# Patient Record
Sex: MALE | Race: WHITE | URBAN - METROPOLITAN AREA
[De-identification: names, ages, dates, MRNs, and addresses within clinical notes are randomized per-mention and may not be internally consistent; named-entity substitution may affect disease eponyms.]

---

## 2022-03-11 LAB
INFLUENZA A BY PCR: NOT DETECTED
INFLUENZA B BY PCR: NOT DETECTED

## 2022-03-12 LAB
SARS-COV-2: NOT DETECTED
SOURCE: NORMAL

## 2023-02-08 ENCOUNTER — OUTSIDE SERVICES (OUTPATIENT)
Dept: INTERNAL MEDICINE CLINIC | Age: 48
End: 2023-02-08

## 2023-02-08 DIAGNOSIS — M10.9 GOUT, UNSPECIFIED CAUSE, UNSPECIFIED CHRONICITY, UNSPECIFIED SITE: ICD-10-CM

## 2023-02-08 DIAGNOSIS — E27.40 UNSPECIFIED ADRENOCORTICAL INSUFFICIENCY (HCC): ICD-10-CM

## 2023-02-08 DIAGNOSIS — G47.39 OTHER SLEEP APNEA: ICD-10-CM

## 2023-02-08 DIAGNOSIS — N31.9 NEUROMUSCULAR DYSFUNCTION OF BLADDER, UNSPECIFIED: ICD-10-CM

## 2023-02-08 DIAGNOSIS — E66.01 MORBID (SEVERE) OBESITY DUE TO EXCESS CALORIES (HCC): ICD-10-CM

## 2023-02-08 DIAGNOSIS — J96.02 ACUTE RESPIRATORY FAILURE WITH HYPERCAPNIA (HCC): Primary | ICD-10-CM

## 2023-02-08 DIAGNOSIS — I10 ESSENTIAL (PRIMARY) HYPERTENSION: ICD-10-CM

## 2023-02-08 DIAGNOSIS — Q05.9 SPINA BIFIDA, UNSPECIFIED HYDROCEPHALUS PRESENCE, UNSPECIFIED SPINAL REGION (HCC): ICD-10-CM

## 2023-02-08 DIAGNOSIS — G91.2 (IDIOPATHIC) NORMAL PRESSURE HYDROCEPHALUS (HCC): ICD-10-CM

## 2023-02-08 DIAGNOSIS — N18.9 CHRONIC KIDNEY DISEASE, UNSPECIFIED CKD STAGE: ICD-10-CM

## 2023-02-11 VITALS
SYSTOLIC BLOOD PRESSURE: 110 MMHG | DIASTOLIC BLOOD PRESSURE: 68 MMHG | WEIGHT: 246 LBS | TEMPERATURE: 97.6 F | RESPIRATION RATE: 18 BRPM | OXYGEN SATURATION: 97 % | HEART RATE: 88 BPM

## 2023-02-11 RX ORDER — SODIUM PHOSPHATE, DIBASIC AND SODIUM PHOSPHATE, MONOBASIC 7; 19 G/133ML; G/133ML
1 ENEMA RECTAL
COMMUNITY

## 2023-02-11 RX ORDER — BISACODYL 10 MG
10 SUPPOSITORY, RECTAL RECTAL DAILY PRN
COMMUNITY

## 2023-02-11 RX ORDER — ACETAMINOPHEN 325 MG/1
650 TABLET ORAL EVERY 6 HOURS PRN
COMMUNITY

## 2023-02-11 RX ORDER — GUAIFENESIN 600 MG/1
600 TABLET, EXTENDED RELEASE ORAL 2 TIMES DAILY
COMMUNITY

## 2023-02-11 RX ORDER — DOCUSATE SODIUM 100 MG/1
100 CAPSULE, LIQUID FILLED ORAL EVERY MORNING
COMMUNITY

## 2023-02-11 RX ORDER — SENNA PLUS 8.6 MG/1
1 TABLET ORAL
COMMUNITY

## 2023-02-11 RX ORDER — POLYETHYLENE GLYCOL 3350 17 G/17G
17 POWDER, FOR SOLUTION ORAL DAILY PRN
COMMUNITY

## 2023-02-11 RX ORDER — FLUTICASONE PROPIONATE 50 MCG
1 SPRAY, SUSPENSION (ML) NASAL EVERY 12 HOURS PRN
COMMUNITY

## 2023-02-11 RX ORDER — ATENOLOL 50 MG/1
50 TABLET ORAL DAILY
COMMUNITY

## 2023-02-11 RX ORDER — ALBUTEROL SULFATE 2.5 MG/3ML
2.5 SOLUTION RESPIRATORY (INHALATION) EVERY 6 HOURS PRN
COMMUNITY

## 2023-02-11 ASSESSMENT — ENCOUNTER SYMPTOMS
ABDOMINAL PAIN: 0
DIARRHEA: 0
NAUSEA: 0
SHORTNESS OF BREATH: 0
VOMITING: 0

## 2023-02-11 NOTE — PROGRESS NOTES
Visit Date: 02/08/2023 [facility] [place]  Maryellen Reef  1975  male 49 y.o. Subjective:    CC: Patient presents with no current complaints. HPI: Patient presented to [facility] on 1/8/2023. He was severely hypothermic with a rectal temp of 90.1. He also presented with UTI and sepsis. It was thought that the hypothermia was from adrenal insufficiency and patient was given stress dose of IV steroids. He was admitted to ICU. Patient had a change in mental status and was found to be hypoxic with pulse ox in the 50s. Code Blue was called and CPR started. He was intubated and placed on ventilator. A central and art line placed. Patient's code status was changed from [facility] to [facility]. He was extubated on 1/10 however still requiring supplemental oxygen and sometimes requiring Bipap. Patient completed IV Rocephin for UTI. Patient does have a history of spina bifida. He was discharged to [facility] for rehab. Patient was seen and examined. Medications on chart reviewed. Labs are reviewed. Discussed with nursing staff. ROS:  Review of Systems   Constitutional:  Negative for chills and fever. Respiratory:  Negative for shortness of breath. Cardiovascular:  Negative for chest pain. Gastrointestinal:  Negative for abdominal pain, diarrhea, nausea and vomiting. Genitourinary:  Negative for dysuria and frequency. Neurological:  Negative for dizziness and headaches. Current Meds: Refer to nursing home record    PMH:    Medical Problems: reviewed and updated   No past medical history on file. Surgical Hx: reviewed and updated   No past surgical history on file. FH: reviewed and updated   No family history on file. SH: reviewed and updated         Objective:  /68   Pulse 88   Temp 97.6 °F (36.4 °C)   Resp 18   Wt 246 lb (111.6 kg)   SpO2 97%      Physical Exam  Constitutional:       General: He is awake. Appearance: He is obese. HENT:      Head: Normocephalic and atraumatic. Eyes:      Extraocular Movements: Extraocular movements intact. Pupils: Pupils are equal, round, and reactive to light. Cardiovascular:      Rate and Rhythm: Normal rate and regular rhythm. Heart sounds: S1 normal and S2 normal. Heart sounds are distant. No murmur heard. No friction rub. No gallop. Pulmonary:      Breath sounds: Examination of the right-lower field reveals decreased breath sounds. Examination of the left-lower field reveals decreased breath sounds. Decreased breath sounds (Slightly diminished over bilateral bases) present. Chest:      Chest wall: No tenderness. Abdominal:      General: Bowel sounds are normal. There is no distension. Palpations: Abdomen is soft. There is no mass. Tenderness: There is no abdominal tenderness. Comments: No organomegaly, Obese   Genitourinary:     Comments: Neurogenic bladder, Anthony in place  Musculoskeletal:         General: No tenderness. Normal range of motion. Cervical back: Neck supple. No tenderness. Right lower leg: Edema present. Left lower leg: Edema present. Right foot: Swelling (Puffiness of dorsum) present. Left foot: Swelling (Puffiness of dorsum) present. Comments: Increased girth of both lower extremities Venous stasis of bilateral lower extremities, Chronic lymphedema of bilateral lower extremities   Skin:     General: Skin is warm and dry. Neurological:      General: No focal deficit present. Mental Status: He is alert and oriented to person, place, and time. Motor: Weakness (Bilateral leg weakness since birth) present. Assessment & Plan:  1. Acute respiratory failure with hypercapnia (HCC)  2. Chronic kidney disease, unspecified CKD stage  3. Gout, unspecified cause, unspecified chronicity, unspecified site  4. (Idiopathic) normal pressure hydrocephalus (HCC)  5. Essential (primary) hypertension  6.  Morbid (severe) obesity due to excess calories (HCC)  7. Other sleep apnea  8. Spina bifida, unspecified hydrocephalus presence, unspecified spinal region ([facility] [address]  9. Unspecified adrenocortical insufficiency ([facility] [address]  10.  Neuromuscular dysfunction of bladder, unspecified     IVero  am scribing for Dr. Umberto Lucio MD, personally performed the services described in this documentation as scribed by Vero Hernandez and it is both accurate and complete

## 2023-03-02 ENCOUNTER — OUTSIDE SERVICES (OUTPATIENT)
Dept: PRIMARY CARE CLINIC | Age: 48
End: 2023-03-02
Payer: MEDICARE

## 2023-03-02 DIAGNOSIS — G91.2 (IDIOPATHIC) NORMAL PRESSURE HYDROCEPHALUS (HCC): ICD-10-CM

## 2023-03-02 DIAGNOSIS — E66.01 MORBID OBESITY DUE TO EXCESS CALORIES (HCC): ICD-10-CM

## 2023-03-02 DIAGNOSIS — G47.33 OBSTRUCTIVE SLEEP APNEA SYNDROME: ICD-10-CM

## 2023-03-02 DIAGNOSIS — I10 ESSENTIAL HYPERTENSION: ICD-10-CM

## 2023-03-02 DIAGNOSIS — N31.9 NEUROMUSCULAR DYSFUNCTION OF BLADDER: ICD-10-CM

## 2023-03-02 DIAGNOSIS — G82.20 PARAPLEGIA (HCC): ICD-10-CM

## 2023-03-02 DIAGNOSIS — E27.40 UNSPECIFIED ADRENOCORTICAL INSUFFICIENCY (HCC): ICD-10-CM

## 2023-03-02 DIAGNOSIS — Q05.4 SPINA BIFIDA WITH HYDROCEPHALUS, UNSPECIFIED SPINAL REGION (HCC): Primary | ICD-10-CM

## 2023-03-02 DIAGNOSIS — I89.0 LYMPHEDEMA OF BOTH LOWER EXTREMITIES: ICD-10-CM

## 2023-03-02 DIAGNOSIS — M1A.00X0 IDIOPATHIC CHRONIC GOUT WITHOUT TOPHUS, UNSPECIFIED SITE: ICD-10-CM

## 2023-03-02 DIAGNOSIS — N18.9 CHRONIC KIDNEY DISEASE, UNSPECIFIED CKD STAGE: ICD-10-CM

## 2023-03-02 PROCEDURE — 99309 SBSQ NF CARE MODERATE MDM 30: CPT | Performed by: NURSE PRACTITIONER

## 2023-03-02 ASSESSMENT — ENCOUNTER SYMPTOMS
NAUSEA: 0
BACK PAIN: 0
CONSTIPATION: 0
COUGH: 0
EYE DISCHARGE: 0
CHOKING: 0
SHORTNESS OF BREATH: 1
SINUS PRESSURE: 0
BLOOD IN STOOL: 0
ABDOMINAL PAIN: 0
EYE REDNESS: 0
VOICE CHANGE: 0
WHEEZING: 0
FACIAL SWELLING: 0
VOMITING: 0
PHOTOPHOBIA: 0
TROUBLE SWALLOWING: 0
RHINORRHEA: 0
SORE THROAT: 0
COLOR CHANGE: 1
SINUS PAIN: 0
CHEST TIGHTNESS: 0
ABDOMINAL DISTENTION: 0
EYE ITCHING: 0
EYE PAIN: 0
DIARRHEA: 0

## 2023-03-02 ASSESSMENT — VISUAL ACUITY: OU: 1

## 2023-03-02 NOTE — PROGRESS NOTES
3/2/23  Reshma Deras : 1975 Sex: male  Age: 49 y.o. Robbie Arroyo is seen today, on an acute visit, for cellulitis to his left lower extremity. He does have a history of paraplegia, secondary to spina bifida, which he reports that he has no feeling to his lower legs. He is also been diagnosed with lymphedema to both lower extremities. Staff reports that they had noted some redness and increased swelling of his left leg. He was recently put on an antibiotic and Lasix, which staff reports has been improving his condition. He denies any other issues today and staff reports that they are not aware of any acute problems with him. They deny any combative, disruptive or aggressive behaviors from him. Review of Systems   Constitutional:  Positive for fatigue. Negative for appetite change, chills, diaphoresis, fever and unexpected weight change. HENT:  Negative for congestion, ear pain, facial swelling, hearing loss, nosebleeds, postnasal drip, rhinorrhea, sinus pressure, sinus pain, sneezing, sore throat, tinnitus, trouble swallowing and voice change. Eyes:  Negative for photophobia, pain, discharge, redness and itching. Respiratory:  Positive for shortness of breath (Chronic issue, currently on oxygen via nasal cannula). Negative for cough, choking, chest tightness and wheezing. Cardiovascular:  Positive for leg swelling (Chronic, lymphedema reported to lower extremities). Negative for chest pain and palpitations. Gastrointestinal:  Negative for abdominal distention, abdominal pain, blood in stool, constipation, diarrhea, nausea and vomiting. Endocrine: Negative for cold intolerance, heat intolerance, polydipsia, polyphagia and polyuria. Genitourinary:  Negative for difficulty urinating, dysuria, flank pain, frequency, hematuria and urgency. Musculoskeletal:  Positive for gait problem (Nonambulatory).  Negative for arthralgias, back pain, joint swelling, myalgias, neck pain and neck stiffness. Skin:  Positive for color change (Staff reports redness to left lower leg). Negative for rash and wound. Allergic/Immunologic: Negative for environmental allergies and food allergies. Neurological:  Positive for weakness. Negative for dizziness, tremors, seizures, syncope, facial asymmetry, speech difficulty, light-headedness, numbness and headaches. He reports no feeling to lower extremities, secondary to spina bifida   Hematological:  Does not bruise/bleed easily. Psychiatric/Behavioral:  Negative for agitation, behavioral problems, confusion, decreased concentration, dysphoric mood, hallucinations, self-injury, sleep disturbance and suicidal ideas. The patient is not nervous/anxious. Physical Exam  Vitals and nursing note reviewed. Constitutional:       General: He is awake. He is not in acute distress. Appearance: Normal appearance. He is well-developed. He is morbidly obese. He is not ill-appearing, toxic-appearing or diaphoretic. HENT:      Head: Normocephalic and atraumatic. Right Ear: Hearing and external ear normal. There is no impacted cerumen. Left Ear: Hearing and external ear normal. There is no impacted cerumen. Nose: Nose normal. No congestion or rhinorrhea. Mouth/Throat:      Lips: Pink. No lesions. Mouth: Mucous membranes are moist.      Pharynx: Oropharynx is clear. No oropharyngeal exudate or posterior oropharyngeal erythema. Eyes:      General: Lids are normal. Vision grossly intact. Gaze aligned appropriately. No scleral icterus. Right eye: No discharge. Left eye: No discharge. Extraocular Movements: Extraocular movements intact. Conjunctiva/sclera: Conjunctivae normal.      Right eye: Right conjunctiva is not injected. Left eye: Left conjunctiva is not injected. Pupils: Pupils are equal, round, and reactive to light. Neck:      Thyroid: No thyromegaly. Vascular: No carotid bruit. Trachea: Trachea normal.   Cardiovascular:      Rate and Rhythm: Normal rate and regular rhythm. Pulses: Normal pulses. Heart sounds: Normal heart sounds, S1 normal and S2 normal. No murmur heard. No friction rub. No gallop. Pulmonary:      Effort: Pulmonary effort is normal. No tachypnea, accessory muscle usage or respiratory distress. Breath sounds: Normal breath sounds and air entry. No stridor. No wheezing, rhonchi or rales. Chest:      Chest wall: No tenderness. Abdominal:      General: Bowel sounds are normal. There is no distension. Palpations: Abdomen is soft. There is no mass. Tenderness: There is no abdominal tenderness. There is no right CVA tenderness, left CVA tenderness, guarding or rebound. Hernia: No hernia is present. Genitourinary:     Comments: Anthony catheter present with clear urine and sediment noted in bag and tubing. Musculoskeletal:         General: No swelling, tenderness, deformity or signs of injury. Normal range of motion. Cervical back: Full passive range of motion without pain, normal range of motion and neck supple. No rigidity. No muscular tenderness. Right lower le+ Edema (History of lymphedema) present. Left lower le+ Edema (History of lymphedema) present. Comments: Unable to move legs, due to paraplegia, secondary to spina bifida and lymphedema   Lymphadenopathy:      Cervical: No cervical adenopathy. Skin:     General: Skin is warm and dry. Capillary Refill: Capillary refill takes less than 2 seconds. Coloration: Skin is not jaundiced or pale. Findings: Erythema (LLL) present. No bruising, lesion or rash. Neurological:      General: No focal deficit present. Mental Status: He is alert and oriented to person, place, and time. Mental status is at baseline. Sensory: Sensory deficit (No sensation to lower extremities) present. Gait: Gait abnormal (Nonambulatory). Psychiatric:         Attention and Perception: Attention and perception normal.         Mood and Affect: Mood and affect normal.         Speech: Speech normal.         Behavior: Behavior normal. Behavior is cooperative. Thought Content: Thought content normal.         Cognition and Memory: Cognition and memory normal.         Judgment: Judgment normal.       Assessment and Plan:  Marie Altman was seen today for cellulitis. Diagnoses and all orders for this visit:    Spina bifida with hydrocephalus, unspecified spinal region ([address]    (Idiopathic) normal pressure hydrocephalus (HCC)    Paraplegia (HCC)    Neuromuscular dysfunction of bladder    Lymphedema of both lower extremities    Chronic kidney disease, unspecified CKD stage    Idiopathic chronic gout without tophus, unspecified site    Essential hypertension    Morbid obesity due to excess calories ([address]    Obstructive sleep apnea syndrome    Unspecified adrenocortical insufficiency ([address]      Discussions/Education provided to patients during visit:  [] Discussed the importance to stop smoking. [x] Advised to monitor eating habits. [] Reviewed and discussed Imaging results. [] Reviewed and discussed Lab results. [x] Discussed the importance of drinking plenty of fluids. [] Cut down on Salt, Caffeine, and Sugar. [x] Continue Medications as Discussed. [x] Communicated with staff any concerns, to phone office. Orders written: Awaiting results of ultrasound. Return in about 1 week (around 3/9/2023) for Reassess condition.       Seen By:  LENNY Pettit NP

## 2023-03-09 ENCOUNTER — OUTSIDE SERVICES (OUTPATIENT)
Dept: PRIMARY CARE CLINIC | Age: 48
End: 2023-03-09
Payer: MEDICARE

## 2023-03-09 DIAGNOSIS — L03.116 CELLULITIS OF LEFT LOWER EXTREMITY: ICD-10-CM

## 2023-03-09 DIAGNOSIS — M1A.00X0 IDIOPATHIC CHRONIC GOUT WITHOUT TOPHUS, UNSPECIFIED SITE: ICD-10-CM

## 2023-03-09 DIAGNOSIS — N18.9 CHRONIC KIDNEY DISEASE, UNSPECIFIED CKD STAGE: ICD-10-CM

## 2023-03-09 DIAGNOSIS — N31.9 NEUROMUSCULAR DYSFUNCTION OF BLADDER: ICD-10-CM

## 2023-03-09 DIAGNOSIS — E66.01 MORBID OBESITY DUE TO EXCESS CALORIES (HCC): ICD-10-CM

## 2023-03-09 DIAGNOSIS — L03.115 CELLULITIS OF RIGHT LOWER EXTREMITY: Primary | ICD-10-CM

## 2023-03-09 DIAGNOSIS — I10 ESSENTIAL HYPERTENSION: ICD-10-CM

## 2023-03-09 DIAGNOSIS — G47.33 OBSTRUCTIVE SLEEP APNEA SYNDROME: ICD-10-CM

## 2023-03-09 DIAGNOSIS — I89.0 LYMPHEDEMA OF BOTH LOWER EXTREMITIES: ICD-10-CM

## 2023-03-09 DIAGNOSIS — G82.20 PARAPLEGIA (HCC): ICD-10-CM

## 2023-03-09 DIAGNOSIS — Q05.4 SPINA BIFIDA WITH HYDROCEPHALUS, UNSPECIFIED SPINAL REGION (HCC): ICD-10-CM

## 2023-03-09 PROCEDURE — 99309 SBSQ NF CARE MODERATE MDM 30: CPT | Performed by: NURSE PRACTITIONER

## 2023-03-09 NOTE — PROGRESS NOTES
3/9/23  Andree Chandler [facility] : 1975 Sex: male  Age: 49 y.o. Elena Temple is seen today for a reassessment of cellulitis to his left lower leg. Staff reports that the redness and swelling has not significantly improved to his left lower leg and they also note some redness to his right foot. He is currently on Augmentin, with today being his last day. He was also evaluated today by Dr. Calvin Mayer, with wound care. I did speak with him and we decided to put him on doxycycline, to see if this would help to treat this infection. He was supposed to have a suprapubic catheter put in, tomorrow, but I advised staff to contact office to see if they would like to reschedule. Staff denies any further issues with him. They deny any combative, disruptive or aggressive behaviors from him. Review of Systems  Constitutional:  Positive for fatigue. Negative for appetite change, chills, diaphoresis, fever and unexpected weight change. HENT:  Negative for congestion, ear pain, facial swelling, hearing loss, nosebleeds, postnasal drip, rhinorrhea, sinus pressure, sinus pain, sneezing, sore throat, tinnitus, trouble swallowing and voice change. Eyes:  Negative for photophobia, pain, discharge, redness and itching. Respiratory:  Positive for shortness of breath (Chronic issue, currently on oxygen via nasal cannula). Negative for cough, choking, chest tightness and wheezing. Cardiovascular:  Positive for leg swelling (Chronic, lymphedema reported to lower extremities). Negative for chest pain and palpitations. Gastrointestinal:  Negative for abdominal distention, abdominal pain, blood in stool, constipation, diarrhea, nausea and vomiting. Endocrine: Negative for cold intolerance, heat intolerance, polydipsia, polyphagia and polyuria. Genitourinary:  Negative for difficulty urinating, dysuria, flank pain, frequency, hematuria and urgency. Musculoskeletal:  Positive for gait problem (Nonambulatory).  Negative for arthralgias, back pain, joint swelling, myalgias, neck pain and neck stiffness. Skin:  Positive for color change (Staff reports redness to left lower leg). Negative for rash and wound. Allergic/Immunologic: Negative for environmental allergies and food allergies. Neurological:  Positive for weakness. Negative for dizziness, tremors, seizures, syncope, facial asymmetry, speech difficulty, light-headedness, numbness and headaches. He reports no feeling to lower extremities, secondary to spina bifida   Hematological:  Does not bruise/bleed easily. Psychiatric/Behavioral:  Negative for agitation, behavioral problems, confusion, decreased concentration, dysphoric mood, hallucinations, self-injury, sleep disturbance and suicidal ideas. The patient is not nervous/anxious. Physical Exam  Vitals and nursing note reviewed. Constitutional:       General: He is awake. He is not in acute distress. Appearance: Normal appearance. He is well-developed. He is morbidly obese. He is not ill-appearing, toxic-appearing or diaphoretic. HENT:      Head: Normocephalic and atraumatic. Right Ear: Hearing and external ear normal. There is no impacted cerumen. Left Ear: Hearing and external ear normal. There is no impacted cerumen. Nose: Nose normal. No congestion or rhinorrhea. Mouth/Throat:      Lips: Pink. No lesions. Mouth: Mucous membranes are moist.      Pharynx: Oropharynx is clear. No oropharyngeal exudate or posterior oropharyngeal erythema. Eyes:      General: Lids are normal. Vision grossly intact. Gaze aligned appropriately. No scleral icterus. Right eye: No discharge. Left eye: No discharge. Extraocular Movements: Extraocular movements intact. Conjunctiva/sclera: Conjunctivae normal.      Right eye: Right conjunctiva is not injected. Left eye: Left conjunctiva is not injected. Pupils: Pupils are equal, round, and reactive to light.    Neck: Thyroid: No thyromegaly. Vascular: No carotid bruit. Trachea: Trachea normal.   Cardiovascular:      Rate and Rhythm: Normal rate and regular rhythm. Pulses: Normal pulses. Heart sounds: Normal heart sounds, S1 normal and S2 normal. No murmur heard. No friction rub. No gallop. Pulmonary:      Effort: Pulmonary effort is normal. No tachypnea, accessory muscle usage or respiratory distress. Breath sounds: Normal breath sounds and air entry. No stridor. No wheezing, rhonchi or rales. Chest:      Chest wall: No tenderness. Abdominal:      General: Bowel sounds are normal. There is no distension. Palpations: Abdomen is soft. There is no mass. Tenderness: There is no abdominal tenderness. There is no right CVA tenderness, left CVA tenderness, guarding or rebound. Hernia: No hernia is present. Genitourinary:     Comments: Anthony catheter present with clear urine and sediment noted in bag and tubing. Musculoskeletal:         General: No swelling, tenderness, deformity or signs of injury. Normal range of motion. Cervical back: Full passive range of motion without pain, normal range of motion and neck supple. No rigidity. No muscular tenderness. Right lower le+ Edema (History of lymphedema) present. Left lower le+ Edema (History of lymphedema) present. Comments: Unable to move legs, due to paraplegia, secondary to spina bifida and lymphedema   Lymphadenopathy:      Cervical: No cervical adenopathy. Skin:     General: Skin is warm and dry. Capillary Refill: Capillary refill takes less than 2 seconds. Coloration: Skin is not jaundiced or pale. Findings: Erythema (LLL and right foot) present. No bruising, lesion or rash. Neurological:      General: No focal deficit present. Mental Status: He is alert and oriented to person, place, and time. Mental status is at baseline.       Sensory: Sensory deficit (No sensation to lower extremities) present. Gait: Gait abnormal (Nonambulatory). Psychiatric:         Attention and Perception: Attention and perception normal.         Mood and Affect: Mood and affect normal.         Speech: Speech normal.         Behavior: Behavior normal. Behavior is cooperative. Thought Content: Thought content normal.         Cognition and Memory: Cognition and memory normal.         Judgment: Judgment normal.     Assessment and Plan:  Bailee Tuttle was seen today for cellulitis. Diagnoses and all orders for this visit:    Cellulitis of right lower extremity    Cellulitis of left lower extremity    Lymphedema of both lower extremities    Spina bifida with hydrocephalus, unspecified spinal region ([address]    Paraplegia ([facility] [address]    Neuromuscular dysfunction of bladder    Idiopathic chronic gout without tophus, unspecified site    Essential hypertension    Chronic kidney disease, unspecified CKD stage    Morbid obesity due to excess calories ([facility] [address]    Obstructive sleep apnea syndrome      Discussions/Education provided to patients during visit:  [] Discussed the importance to stop smoking. [x] Advised to monitor eating habits. [] Reviewed and discussed Imaging results. [] Reviewed and discussed Lab results. [x] Discussed the importance of drinking plenty of fluids. [] Cut down on Salt, Caffeine, and Sugar. [x] Continue Medications as Discussed. [x] Communicated with patient any concerns, to phone office. Orders written: Wound culture of right foot and doxycycline 100 mg p.o. twice daily x14 days. Return in about 1 week (around 3/16/2023) for Reassess condition.       Seen By:  LENNY Jerome - YESSENIA

## 2023-06-30 ENCOUNTER — HOSPITAL ENCOUNTER (OUTPATIENT)
Dept: WOUND CARE | Age: 48
Discharge: HOME | End: 2023-06-30

## 2023-07-03 NOTE — DISCHARGE INSTRUCTIONS
Visit Discharge/Physician Orders    Discharge condition: Stable    Assessment of pain at discharge: MILD    Anesthetic used: 4% LIDOCAINE    Discharge to: Home    Left via:Private automobile    Accompanied by: accompanied by mother    ECF/HHA:  [facility]     Dressing Orders: Cleanse wounds with sterile saline. To bilateral foot wounds, right ischium, and back wound, apply silver alginate and secure with dry dressings. Change daily. Home care to change once weekly. Treatment Orders: Follow a nutritious diet. Choose foods high in protein: chicken, fish, and eggs. Choose foods high in Vitamin C. Multivitamin daily unless contraindicated. Offload back as often as possible. Try to change position every 2 hours. X-rays of bilateral feet and sacrum ordered  Bloodwork ordered, please obtain as soon as possible    [address] [place] [address] followup visit ______2 weeks _______________________  (Please note your next appointment above and if you are unable to keep, kindly give a 24 hour notice. Thank you.)    Physician signature:__________________________      If you experience any of the following, please call the [facility] during business hours:    * Increase in Pain  * Temperature over 101  * Increase in drainage from your wound  * Drainage with a foul odor  * Bleeding  * Increase in swelling  * Need for compression bandage changes due to slippage, breakthrough drainage. If you need medical attention outside of the business hours of the [facility] please contact your PCP or go to the nearest emergency room.

## 2023-07-07 ENCOUNTER — HOSPITAL ENCOUNTER (OUTPATIENT)
Age: 48
End: 2023-07-07
Payer: MEDICARE

## 2023-07-07 ENCOUNTER — HOSPITAL ENCOUNTER (OUTPATIENT)
Age: 48
Discharge: HOME | End: 2023-07-07
Payer: OTHER GOVERNMENT

## 2023-07-07 ENCOUNTER — HOSPITAL ENCOUNTER (OUTPATIENT)
Dept: WOUND CARE | Age: 48
Discharge: HOME | End: 2023-07-07
Payer: OTHER GOVERNMENT

## 2023-07-07 VITALS
WEIGHT: 230 LBS | TEMPERATURE: 96.4 F | SYSTOLIC BLOOD PRESSURE: 112 MMHG | RESPIRATION RATE: 16 BRPM | HEIGHT: 63 IN | HEART RATE: 80 BPM | DIASTOLIC BLOOD PRESSURE: 70 MMHG | BODY MASS INDEX: 40.75 KG/M2

## 2023-07-07 DIAGNOSIS — L89.313 DECUBITUS ULCER OF ISCHIAL AREA, RIGHT, STAGE III (HCC): ICD-10-CM

## 2023-07-07 DIAGNOSIS — Q05.9 DECUBITUS ULCER DUE TO SPINA BIFIDA (HCC): ICD-10-CM

## 2023-07-07 DIAGNOSIS — L89.103 DECUBITUS ULCER OF BACK, STAGE 3 (HCC): ICD-10-CM

## 2023-07-07 DIAGNOSIS — L97.512 ULCER OF FOOT, RIGHT, WITH FAT LAYER EXPOSED (HCC): ICD-10-CM

## 2023-07-07 DIAGNOSIS — R09.89 DECREASED DORSALIS PEDIS PULSE: ICD-10-CM

## 2023-07-07 DIAGNOSIS — L89.90 DECUBITUS ULCER DUE TO SPINA BIFIDA (HCC): ICD-10-CM

## 2023-07-07 DIAGNOSIS — L97.522 ULCER OF FOOT, LEFT, WITH FAT LAYER EXPOSED (HCC): ICD-10-CM

## 2023-07-07 LAB
ALBUMIN SERPL-MCNC: 3.7 G/DL (ref 3.5–5.2)
ALP SERPL-CCNC: 94 U/L (ref 40–129)
ALT SERPL-CCNC: 7 U/L (ref 0–40)
ANION GAP SERPL CALCULATED.3IONS-SCNC: 8 MMOL/L (ref 7–16)
AST SERPL-CCNC: 10 U/L (ref 0–39)
BILIRUB SERPL-MCNC: <0.2 MG/DL (ref 0–1.2)
BUN SERPL-MCNC: 12 MG/DL (ref 6–20)
CALCIUM SERPL-MCNC: 9.5 MG/DL (ref 8.6–10.2)
CHLORIDE SERPL-SCNC: 99 MMOL/L (ref 98–107)
CO2 SERPL-SCNC: 33 MMOL/L (ref 22–29)
CREAT SERPL-MCNC: 0.6 MG/DL (ref 0.7–1.2)
ERYTHROCYTE [DISTWIDTH] IN BLOOD BY AUTOMATED COUNT: 16.1 FL (ref 11.5–15)
GLUCOSE SERPL-MCNC: 94 MG/DL (ref 74–99)
HCT VFR BLD AUTO: 38.9 % (ref 37–54)
HGB BLD-MCNC: 12 G/DL (ref 12.5–16.5)
MCH RBC QN AUTO: 29.3 PG (ref 26–35)
MCHC RBC AUTO-ENTMCNC: 30.8 % (ref 32–34.5)
MCV RBC AUTO: 95.1 FL (ref 80–99.9)
PLATELET # BLD AUTO: 347 E9/L (ref 130–450)
PMV BLD AUTO: 9.2 FL (ref 7–12)
POTASSIUM SERPL-SCNC: 4 MMOL/L (ref 3.5–5)
PROT SERPL-MCNC: 7.5 G/DL (ref 6.4–8.3)
RBC # BLD AUTO: 4.09 E12/L (ref 3.8–5.8)
SODIUM SERPL-SCNC: 140 MMOL/L (ref 132–146)
WBC # BLD: 7 E9/L (ref 4.5–11.5)

## 2023-07-07 PROCEDURE — 87077 CULTURE AEROBIC IDENTIFY: CPT

## 2023-07-07 PROCEDURE — 87186 SC STD MICRODIL/AGAR DIL: CPT

## 2023-07-07 PROCEDURE — 87205 SMEAR GRAM STAIN: CPT

## 2023-07-07 PROCEDURE — 87070 CULTURE OTHR SPECIMN AEROBIC: CPT

## 2023-07-07 PROCEDURE — 80053 COMPREHEN METABOLIC PANEL: CPT

## 2023-07-07 PROCEDURE — 85027 COMPLETE CBC AUTOMATED: CPT

## 2023-07-07 PROCEDURE — 36415 COLL VENOUS BLD VENIPUNCTURE: CPT

## 2023-07-07 PROCEDURE — 87075 CULTR BACTERIA EXCEPT BLOOD: CPT

## 2023-07-07 RX ORDER — LIDOCAINE 40 MG/G
CREAM TOPICAL ONCE
OUTPATIENT
Start: 2023-07-07 | End: 2023-07-07

## 2023-07-07 RX ORDER — IBUPROFEN 200 MG
TABLET ORAL ONCE
OUTPATIENT
Start: 2023-07-07 | End: 2023-07-07

## 2023-07-07 RX ORDER — BETAMETHASONE DIPROPIONATE 0.05 %
OINTMENT (GRAM) TOPICAL ONCE
OUTPATIENT
Start: 2023-07-07 | End: 2023-07-07

## 2023-07-07 RX ORDER — POTASSIUM CHLORIDE 750 MG/1
20 TABLET, FILM COATED, EXTENDED RELEASE ORAL 3 TIMES DAILY
COMMUNITY

## 2023-07-07 RX ORDER — SULFAMETHOXAZOLE AND TRIMETHOPRIM 800; 160 MG/1; MG/1
1 TABLET ORAL 2 TIMES DAILY
COMMUNITY

## 2023-07-07 RX ORDER — FUROSEMIDE 20 MG/1
20 TABLET ORAL DAILY
COMMUNITY

## 2023-07-07 RX ORDER — LIDOCAINE 50 MG/G
OINTMENT TOPICAL ONCE
OUTPATIENT
Start: 2023-07-07 | End: 2023-07-07

## 2023-07-07 RX ORDER — LIDOCAINE HYDROCHLORIDE 20 MG/ML
JELLY TOPICAL ONCE
OUTPATIENT
Start: 2023-07-07 | End: 2023-07-07

## 2023-07-07 RX ORDER — GENTAMICIN SULFATE 1 MG/G
OINTMENT TOPICAL ONCE
OUTPATIENT
Start: 2023-07-07 | End: 2023-07-07

## 2023-07-07 RX ORDER — LIDOCAINE HYDROCHLORIDE 40 MG/ML
SOLUTION TOPICAL ONCE
OUTPATIENT
Start: 2023-07-07 | End: 2023-07-07

## 2023-07-07 RX ORDER — CLOBETASOL PROPIONATE 0.5 MG/G
OINTMENT TOPICAL ONCE
OUTPATIENT
Start: 2023-07-07 | End: 2023-07-07

## 2023-07-07 RX ORDER — BACITRACIN ZINC AND POLYMYXIN B SULFATE 500; 1000 [USP'U]/G; [USP'U]/G
OINTMENT TOPICAL ONCE
OUTPATIENT
Start: 2023-07-07 | End: 2023-07-07

## 2023-07-07 RX ORDER — SODIUM CHLOR/HYPOCHLOROUS ACID 0.033 %
SOLUTION, IRRIGATION IRRIGATION ONCE
OUTPATIENT
Start: 2023-07-07 | End: 2023-07-07

## 2023-07-07 RX ORDER — GINSENG 100 MG
CAPSULE ORAL ONCE
OUTPATIENT
Start: 2023-07-07 | End: 2023-07-07

## 2023-07-07 NOTE — PROGRESS NOTES
Wound Healing Center /Hyperbarics   History and Physical/Consultation  Vascular    Referring Physician : No primary care provider on file. Lety Church  MEDICAL RECORD NUMBER:  [id number]  AGE: 49 y.o. GENDER: male  : 1975  EPISODE DATE:  2023  Subjective:     Chief Complaint   Patient presents with    Wound Check     Wound back, bilateral legs, coccyx         HISTORY of PRESENT ILLNESS YAW Gonzalez is a 49 y.o. male who presents today for wound/ulcer evaluation. History of Wound Context:  The patient has had a wounds of both feet, right ischium, back pressure ulcers between the junction of the lumbar spine and thoracic spine which was first noted approximately at least 3 to 4 months, patient and his mother tell me that they noticed that when the patient got home from a extended care facility but they do not recall how long he had ulcers,. This has been treated at the [facility] wound care center and family came to [facility], and in the past he was treated here with good results. On their initial visit to the wound healing center, 23, the patient has noted that the wound has not been improving. The patient has had similar previous wounds in the past.        Patient has spina bifida with hydrocephalus, has undergone previous surgeries multiple including peritoneal venous shunt for hydrocephalus in the [place], has paraplegia, does not ambulate, recently underwent insertion of suprapubic catheter    Pt is currently not on abx.       Wound/Ulcer Pain Timing/Severity: constant  Quality of pain: dull, aching  Severity:  3 / 10   Modifying Factors: None  Associated Signs/Symptoms: drainage and pain    Ulcer Identification:  Ulcer Type: pressure and non-healing/non-surgical  Contributing Factors: lymphedema, chronic pressure, decreased mobility, and shear force    Diabetic/Pressure/Non Pressure Ulcers only:  Ulcer: N/A    If patient has diabetic lower extremity wounds  Liana Hollins

## 2023-07-09 LAB
BACTERIA WND AEROBE CULT: ABNORMAL
GRAM STN SPEC: ABNORMAL
ORGANISM: ABNORMAL

## 2023-07-10 LAB
BACTERIA SPEC ANAEROBE CULT: NORMAL
BACTERIA WND AEROBE CULT: ABNORMAL
GRAM STN SPEC: ABNORMAL
ORGANISM: ABNORMAL

## 2023-07-18 ENCOUNTER — HOSPITAL ENCOUNTER (OUTPATIENT)
Dept: GENERAL RADIOLOGY | Age: 48
Discharge: HOME | End: 2023-07-20
Payer: MEDICARE

## 2023-07-18 ENCOUNTER — HOSPITAL ENCOUNTER (OUTPATIENT)
Age: 48
Discharge: HOME | End: 2023-07-20
Payer: MEDICARE

## 2023-07-18 DIAGNOSIS — Q05.9 DECUBITUS ULCER DUE TO SPINA BIFIDA (HCC): ICD-10-CM

## 2023-07-18 DIAGNOSIS — L89.313 DECUBITUS ULCER OF ISCHIAL AREA, RIGHT, STAGE III (HCC): ICD-10-CM

## 2023-07-18 DIAGNOSIS — L97.522 ULCER OF FOOT, LEFT, WITH FAT LAYER EXPOSED (HCC): ICD-10-CM

## 2023-07-18 DIAGNOSIS — L89.90 DECUBITUS ULCER DUE TO SPINA BIFIDA (HCC): ICD-10-CM

## 2023-07-18 DIAGNOSIS — L89.103 DECUBITUS ULCER OF BACK, STAGE 3 (HCC): ICD-10-CM

## 2023-07-18 DIAGNOSIS — L97.512 ULCER OF FOOT, RIGHT, WITH FAT LAYER EXPOSED (HCC): ICD-10-CM

## 2023-07-18 PROCEDURE — 73630 X-RAY EXAM OF FOOT: CPT

## 2023-07-18 PROCEDURE — 72072 X-RAY EXAM THORAC SPINE 3VWS: CPT

## 2023-07-18 PROCEDURE — 72110 X-RAY EXAM L-2 SPINE 4/>VWS: CPT

## 2023-07-18 NOTE — DISCHARGE INSTRUCTIONS
Visit Discharge/Physician Orders     Discharge condition: Stable     Assessment of pain at discharge: MILD     Anesthetic used: 4% LIDOCAINE     Discharge to: Home     Left via:Private automobile     Accompanied by: accompanied by mother     ECF/HHA:  [address] [place] [address] [place]     Dressing Orders: Cleanse wounds with Dakin's or Vashe. To bilateral foot wounds, and back wound, apply PLAIN alginate and secure with dry dressings. To right ischium wound, begin to pack with Dakin's or Vashe soaked gauze, followed by dry dressing. Change daily. Home care to change once weekly. Begin to wear spandigrips bilaterally during the day, may remove at night. Pause acetic acid for now. Treatment Orders: Follow a nutritious diet. Choose foods high in protein: chicken, fish, and eggs. Choose foods high in Vitamin C. Multivitamin daily unless contraindicated. Offload back as often as possible. Try to change position every 2 hours. Offload feet with eggcrate cushions. X-rays of bilateral feet and sacrum ordered- reviewed  Bloodwork ordered- reviewed  Wound culture reviewed, antibiotic prescribed, please begin to use as directed  ID consult    [facility] followup visit ______2 weeks _______________________  (Please note your next appointment above and if you are unable to keep, kindly give a 24 hour notice. Thank you.)     Physician signature:__________________________        If you experience any of the following, please call the [email or web address] during business hours:     * Increase in Pain  * Temperature over 101  * Increase in drainage from your wound  * Drainage with a foul odor  * Bleeding  * Increase in swelling  * Need for compression bandage changes due to slippage, breakthrough drainage. If you need medical attention outside of the business hours of the [email or web address] please contact your PCP or go to the nearest emergency room.

## 2023-07-21 ENCOUNTER — HOSPITAL ENCOUNTER (OUTPATIENT)
Dept: WOUND CARE | Age: 48
Discharge: HOME | End: 2023-07-21
Payer: MEDICARE

## 2023-07-21 VITALS
HEART RATE: 85 BPM | SYSTOLIC BLOOD PRESSURE: 155 MMHG | TEMPERATURE: 97.4 F | RESPIRATION RATE: 14 BRPM | DIASTOLIC BLOOD PRESSURE: 90 MMHG

## 2023-07-21 DIAGNOSIS — L89.90 DECUBITUS ULCER DUE TO SPINA BIFIDA (HCC): Primary | ICD-10-CM

## 2023-07-21 DIAGNOSIS — L97.512 ULCER OF FOOT, RIGHT, WITH FAT LAYER EXPOSED (HCC): ICD-10-CM

## 2023-07-21 DIAGNOSIS — R89.5 POSITIVE CULTURE FINDINGS IN WOUND: ICD-10-CM

## 2023-07-21 DIAGNOSIS — Q05.9 DECUBITUS ULCER DUE TO SPINA BIFIDA (HCC): Primary | ICD-10-CM

## 2023-07-21 DIAGNOSIS — L89.103 DECUBITUS ULCER OF BACK, STAGE 3 (HCC): ICD-10-CM

## 2023-07-21 DIAGNOSIS — L97.522 ULCER OF FOOT, LEFT, WITH FAT LAYER EXPOSED (HCC): ICD-10-CM

## 2023-07-21 DIAGNOSIS — L89.313 DECUBITUS ULCER OF ISCHIAL AREA, RIGHT, STAGE III (HCC): ICD-10-CM

## 2023-07-21 PROCEDURE — 11046 DBRDMT MUSC&/FSCA EA ADDL: CPT

## 2023-07-21 PROCEDURE — 11043 DBRDMT MUSC&/FSCA 1ST 20/<: CPT

## 2023-07-21 PROCEDURE — 6370000000 HC RX 637 (ALT 250 FOR IP): Performed by: SURGERY

## 2023-07-21 PROCEDURE — 11045 DBRDMT SUBQ TISS EACH ADDL: CPT

## 2023-07-21 PROCEDURE — 11042 DBRDMT SUBQ TIS 1ST 20SQCM/<: CPT

## 2023-07-21 RX ORDER — LIDOCAINE HYDROCHLORIDE 20 MG/ML
JELLY TOPICAL ONCE
OUTPATIENT
Start: 2023-07-21 | End: 2023-07-21

## 2023-07-21 RX ORDER — M-VIT,TX,IRON,MINS/CALC/FOLIC 27MG-0.4MG
1 TABLET ORAL DAILY
COMMUNITY

## 2023-07-21 RX ORDER — CLINDAMYCIN HYDROCHLORIDE 300 MG/1
300 CAPSULE ORAL 3 TIMES DAILY
Qty: 30 CAPSULE | Refills: 0 | Status: SHIPPED | OUTPATIENT
Start: 2023-07-21 | End: 2023-07-31

## 2023-07-21 RX ORDER — LIDOCAINE 50 MG/G
OINTMENT TOPICAL ONCE
OUTPATIENT
Start: 2023-07-21 | End: 2023-07-21

## 2023-07-21 RX ORDER — LIDOCAINE HYDROCHLORIDE 40 MG/ML
SOLUTION TOPICAL ONCE
OUTPATIENT
Start: 2023-07-21 | End: 2023-07-21

## 2023-07-21 RX ORDER — IBUPROFEN 200 MG
TABLET ORAL ONCE
OUTPATIENT
Start: 2023-07-21 | End: 2023-07-21

## 2023-07-21 RX ORDER — LIDOCAINE 40 MG/G
CREAM TOPICAL ONCE
OUTPATIENT
Start: 2023-07-21 | End: 2023-07-21

## 2023-07-21 RX ORDER — LIDOCAINE HYDROCHLORIDE 40 MG/ML
SOLUTION TOPICAL ONCE
Status: COMPLETED | OUTPATIENT
Start: 2023-07-21 | End: 2023-07-21

## 2023-07-21 RX ORDER — GINSENG 100 MG
CAPSULE ORAL ONCE
OUTPATIENT
Start: 2023-07-21 | End: 2023-07-21

## 2023-07-21 RX ORDER — BETAMETHASONE DIPROPIONATE 0.05 %
OINTMENT (GRAM) TOPICAL ONCE
OUTPATIENT
Start: 2023-07-21 | End: 2023-07-21

## 2023-07-21 RX ORDER — CLOBETASOL PROPIONATE 0.5 MG/G
OINTMENT TOPICAL ONCE
OUTPATIENT
Start: 2023-07-21 | End: 2023-07-21

## 2023-07-21 RX ORDER — BACITRACIN ZINC AND POLYMYXIN B SULFATE 500; 1000 [USP'U]/G; [USP'U]/G
OINTMENT TOPICAL ONCE
OUTPATIENT
Start: 2023-07-21 | End: 2023-07-21

## 2023-07-21 RX ORDER — GENTAMICIN SULFATE 1 MG/G
OINTMENT TOPICAL ONCE
OUTPATIENT
Start: 2023-07-21 | End: 2023-07-21

## 2023-07-21 RX ORDER — SODIUM CHLOR/HYPOCHLOROUS ACID 0.033 %
SOLUTION, IRRIGATION IRRIGATION ONCE
OUTPATIENT
Start: 2023-07-21 | End: 2023-07-21

## 2023-07-21 RX ADMIN — LIDOCAINE HYDROCHLORIDE 10 ML: 40 SOLUTION TOPICAL at 13:43

## 2023-07-21 NOTE — PROGRESS NOTES
Anesthetic  Anesthetic: 4% Lidocaine Liquid Topical     Debridement:Excisional Debridement    Using curette the wound(s)/ulcer(s) was/were sharply debrided down through and including the removal of epidermis, dermis, and subcutaneous tissue. Devitalized Tissue Debrided:  fibrin, biofilm, and slough      Wound/Ulcer #: 1, 2, 3, 4, 5, and 6      Percent of Wound/Ulcer Debrided: 100%    Total Surface Area Debrided:  100 sq cm     Estimated Blood Loss:  Minimal    Hemostasis Achieved:  by pressure    Procedural Pain:  4  / 10     Post Procedural Pain:  3 / 10     Response to treatment:  Well tolerated by patient. A culture was not done. Plan:     Pt is not a smoker   In my professional opinion and based off the information that is available at this time this patient has appropriate indication for HBO Therapy: No    Treatment Note please see attached Discharge Instructions    Written patient dismissal instructions given to patient and signed by patient or POA. Discharge Instructions         Visit Discharge/Physician Orders     Discharge condition: Stable     Assessment of pain at discharge: MILD     Anesthetic used: 4% LIDOCAINE     Discharge to: Home     Left via:Private automobile     Accompanied by: accompanied by mother     ECF/HHA:  [address] [place] [address] [place]     Dressing Orders: Cleanse wounds with Dakin's or Vashe. To bilateral foot wounds, and back wound, apply PLAIN alginate and secure with dry dressings. To right ischium wound, begin to pack with Dakin's or Vashe soaked gauze, followed by dry dressing. Change daily. Home care to change once weekly. Begin to wear spandigrips bilaterally during the day, may remove at night. Pause acetic acid for now. Treatment Orders: Follow a nutritious diet. Choose foods high in protein: chicken, fish, and eggs. Choose foods high in Vitamin C. Multivitamin daily unless contraindicated. Offload back as often as possible.  Try to change position every 2

## 2023-08-01 NOTE — DISCHARGE INSTRUCTIONS
Visit Discharge/Physician Orders     Discharge condition: Stable     Assessment of pain at discharge: MILD     Anesthetic used: 4% LIDOCAINE     Discharge to: Home     Left via:Private automobile     Accompanied by: accompanied by mother     [facility]:  [facility]. Dressing Orders: Cleanse all wounds with Dakin's or Vashe. To left  foot wounds, and back wound, apply PLAIN alginate and secure with dry dressings. To right foot wound, pack with VASHE gauze packing and apply dry dressing to secure into place. change daily. To right ischium wound, begin to apply PLAIN alginate followed by dry dressing. Change daily. Home care to change once weekly. Begin to wear spandigrips bilaterally during the day, may remove at night. Pause acetic acid for now. Treatment Orders: REPEAT WOUND CULTURE TAKEN TODAY IN CLINIC FROM RIGHT FOOT WOUND. Follow a nutritious diet. Choose foods high in protein: chicken, fish, and eggs. Choose foods high in Vitamin C. Multivitamin daily unless contraindicated. Offload back as often as possible. Try to change position every 2 hours. Offload feet with eggcrate cushions. wound care to request test results from dr hill   X-rays of bilateral feet and sacrum ordered- reviewed  Bloodwork ordered- reviewed  Wound culture reviewed, antibiotic prescribed, please begin to use as directed  ID consult in future when all test results received from dr. Heide Daniel     [address] [place] [address] followup visit ______2 weeks _______________________  (Please note your next appointment above and if you are unable to keep, kindly give a 24 hour notice.  Thank you.)     Physician signature:__________________________        If you experience any of the following, please call the [address] [place] [address] during business hours:     * Increase in Pain  * Temperature over 101  * Increase in drainage from your wound  * Drainage with a foul odor  * Bleeding  * Increase in swelling  * Need for compression bandage

## 2023-08-04 ENCOUNTER — HOSPITAL ENCOUNTER (OUTPATIENT)
Dept: WOUND CARE | Age: 48
Discharge: HOME | End: 2023-08-04
Payer: MEDICARE

## 2023-08-04 VITALS
SYSTOLIC BLOOD PRESSURE: 140 MMHG | RESPIRATION RATE: 12 BRPM | HEART RATE: 81 BPM | TEMPERATURE: 97.2 F | DIASTOLIC BLOOD PRESSURE: 81 MMHG

## 2023-08-04 DIAGNOSIS — L89.103 DECUBITUS ULCER OF BACK, STAGE 3 (HCC): Primary | ICD-10-CM

## 2023-08-04 DIAGNOSIS — L97.522 ULCER OF FOOT, LEFT, WITH FAT LAYER EXPOSED (HCC): ICD-10-CM

## 2023-08-04 DIAGNOSIS — L89.313 DECUBITUS ULCER OF ISCHIAL AREA, RIGHT, STAGE III (HCC): ICD-10-CM

## 2023-08-04 DIAGNOSIS — I89.0 LYMPHEDEMA OF BOTH LOWER EXTREMITIES: ICD-10-CM

## 2023-08-04 DIAGNOSIS — L97.512 ULCER OF FOOT, RIGHT, WITH FAT LAYER EXPOSED (HCC): ICD-10-CM

## 2023-08-04 PROCEDURE — 87205 SMEAR GRAM STAIN: CPT

## 2023-08-04 PROCEDURE — 87070 CULTURE OTHR SPECIMN AEROBIC: CPT

## 2023-08-04 PROCEDURE — 11042 DBRDMT SUBQ TIS 1ST 20SQCM/<: CPT

## 2023-08-04 PROCEDURE — 87075 CULTR BACTERIA EXCEPT BLOOD: CPT

## 2023-08-04 PROCEDURE — 11045 DBRDMT SUBQ TISS EACH ADDL: CPT

## 2023-08-04 RX ORDER — GENTAMICIN SULFATE 1 MG/G
OINTMENT TOPICAL ONCE
OUTPATIENT
Start: 2023-08-04 | End: 2023-08-04

## 2023-08-04 RX ORDER — IBUPROFEN 200 MG
TABLET ORAL ONCE
OUTPATIENT
Start: 2023-08-04 | End: 2023-08-04

## 2023-08-04 RX ORDER — CLOBETASOL PROPIONATE 0.5 MG/G
OINTMENT TOPICAL ONCE
OUTPATIENT
Start: 2023-08-04 | End: 2023-08-04

## 2023-08-04 RX ORDER — LIDOCAINE 50 MG/G
OINTMENT TOPICAL ONCE
OUTPATIENT
Start: 2023-08-04 | End: 2023-08-04

## 2023-08-04 RX ORDER — LIDOCAINE HYDROCHLORIDE 20 MG/ML
JELLY TOPICAL ONCE
OUTPATIENT
Start: 2023-08-04 | End: 2023-08-04

## 2023-08-04 RX ORDER — BACITRACIN ZINC AND POLYMYXIN B SULFATE 500; 1000 [USP'U]/G; [USP'U]/G
OINTMENT TOPICAL ONCE
OUTPATIENT
Start: 2023-08-04 | End: 2023-08-04

## 2023-08-04 RX ORDER — GINSENG 100 MG
CAPSULE ORAL ONCE
OUTPATIENT
Start: 2023-08-04 | End: 2023-08-04

## 2023-08-04 RX ORDER — LIDOCAINE 40 MG/G
CREAM TOPICAL ONCE
OUTPATIENT
Start: 2023-08-04 | End: 2023-08-04

## 2023-08-04 RX ORDER — LIDOCAINE HYDROCHLORIDE 40 MG/ML
SOLUTION TOPICAL ONCE
OUTPATIENT
Start: 2023-08-04 | End: 2023-08-04

## 2023-08-04 RX ORDER — BETAMETHASONE DIPROPIONATE 0.05 %
OINTMENT (GRAM) TOPICAL ONCE
OUTPATIENT
Start: 2023-08-04 | End: 2023-08-04

## 2023-08-04 RX ORDER — SODIUM CHLOR/HYPOCHLOROUS ACID 0.033 %
SOLUTION, IRRIGATION IRRIGATION ONCE
OUTPATIENT
Start: 2023-08-04 | End: 2023-08-04

## 2023-08-04 NOTE — PLAN OF CARE
Problem: Wound:  Goal: Will show signs of wound healing; wound closure and no evidence of infection  Description: Will show signs of wound healing; wound closure and no evidence of infection  Outcome: Not Progressing     Problem: Pressure Ulcer:  Goal: Signs of wound healing will improve  Description: Signs of wound healing will improve  Outcome: Not Progressing  Goal: Will show no infection signs and symptoms  Description: Will show no infection signs and symptoms  Outcome: Not Progressing     Problem: Wound:  Goal: Will show signs of wound healing; wound closure and no evidence of infection  Description: Will show signs of wound healing; wound closure and no evidence of infection  Outcome: Not Progressing     Problem: Pressure Ulcer:  Goal: Signs of wound healing will improve  Description: Signs of wound healing will improve  Outcome: Not Progressing  Goal: Will show no infection signs and symptoms  Description: Will show no infection signs and symptoms  Outcome: Not Progressing

## 2023-08-06 LAB
MICROORGANISM SPEC CULT: ABNORMAL
MICROORGANISM SPEC CULT: ABNORMAL
MICROORGANISM/AGENT SPEC: ABNORMAL
SPECIMEN DESCRIPTION: ABNORMAL

## 2023-08-08 LAB
MICROORGANISM SPEC CULT: ABNORMAL
SPECIMEN DESCRIPTION: ABNORMAL

## 2023-08-15 NOTE — DISCHARGE INSTRUCTIONS
Visit Discharge/Physician Orders     Discharge condition: Stable     Assessment of pain at discharge: MILD     Anesthetic used: 4% LIDOCAINE     Discharge to: Home     Left via:Private automobile     Accompanied by: accompanied by mother     [facility]:  [facility]. Dressing Orders: Cleanse all wounds with Dakin's or Vashe. To left  foot wounds, and back wound, apply PLAIN alginate and secure with dry dressings. To right foot wound, pack with VASHE gauze packing and apply dry dressing to secure into place. change daily. To right ischium wound, begin to apply PLAIN alginate followed by dry dressing. Change daily. Home care to change once weekly. Begin to wear spandigrips bilaterally during the day, may remove at night. Pause acetic acid for now. Treatment Orders: REPEAT WOUND CULTURE REVIEWED . Kateryna Barron Follow a nutritious diet. Choose foods high in protein: chicken, fish, and eggs. Choose foods high in Vitamin C. Multivitamin daily unless contraindicated. Offload back as often as possible. Try to change position every 2 hours. Offload feet with eggcrate cushions. wound care to request test results from dr hill   X-rays of bilateral feet and sacrum ordered- reviewed  Bloodwork ordered- reviewed  Wound culture reviewed, antibiotic prescribed, please begin to use as directed  ID consult in future when all test results received from dr. Teri Patel     [facility] followup visit ______2 weeks _______________________  (Please note your next appointment above and if you are unable to keep, kindly give a 24 hour notice.  Thank you.)     Physician signature:__________________________        If you experience any of the following, please call the [address] [place] [address] during business hours:     * Increase in Pain  * Temperature over 101  * Increase in drainage from your wound  * Drainage with a foul odor  * Bleeding  * Increase in swelling  * Need for compression bandage changes due to slippage, breakthrough

## 2023-08-16 ENCOUNTER — HOSPITAL ENCOUNTER (OUTPATIENT)
Dept: INTERVENTIONAL RADIOLOGY/VASCULAR | Age: 48
Discharge: HOME | End: 2023-08-18
Attending: SURGERY
Payer: OTHER GOVERNMENT

## 2023-08-16 DIAGNOSIS — R09.89 DECREASED DORSALIS PEDIS PULSE: ICD-10-CM

## 2023-08-16 PROCEDURE — 93922 UPR/L XTREMITY ART 2 LEVELS: CPT

## 2023-08-16 NOTE — DISCHARGE INSTRUCTIONS
Temperature over 101  * Increase in drainage from your wound  * Drainage with a foul odor  * Bleeding  * Increase in swelling  * Need for compression bandage changes due to slippage, breakthrough drainage. If you need medical attention outside of the business hours of the [employer] please contact your PCP or go to the nearest emergency room.

## 2023-08-17 ENCOUNTER — HOSPITAL ENCOUNTER (OUTPATIENT)
Dept: WOUND CARE | Age: 48
Discharge: HOME | End: 2023-08-17
Payer: MEDICARE

## 2023-08-17 VITALS
BODY MASS INDEX: 40.75 KG/M2 | RESPIRATION RATE: 16 BRPM | TEMPERATURE: 96.8 F | DIASTOLIC BLOOD PRESSURE: 83 MMHG | HEART RATE: 81 BPM | WEIGHT: 230 LBS | HEIGHT: 63 IN | SYSTOLIC BLOOD PRESSURE: 148 MMHG

## 2023-08-17 DIAGNOSIS — L97.512 ULCER OF FOOT, RIGHT, WITH FAT LAYER EXPOSED (HCC): ICD-10-CM

## 2023-08-17 DIAGNOSIS — R89.5 POSITIVE CULTURE FINDINGS IN WOUND: Primary | ICD-10-CM

## 2023-08-17 PROCEDURE — 99214 OFFICE O/P EST MOD 30 MIN: CPT

## 2023-08-17 RX ORDER — METRONIDAZOLE 500 MG/1
500 TABLET ORAL 3 TIMES DAILY
Qty: 42 TABLET | Refills: 0 | Status: SHIPPED | OUTPATIENT
Start: 2023-08-17 | End: 2023-08-31

## 2023-08-17 RX ORDER — HYDROCHLOROTHIAZIDE 12.5 MG/1
12.5 CAPSULE, GELATIN COATED ORAL DAILY
COMMUNITY

## 2023-08-17 NOTE — PROGRESS NOTES
[facility] Followup Visit Note    Referring Physician : Dionna Zaldivar MD  [address] [place] [address] RECORD NUMBER:  [id number]  AGE: 49 y.o. GENDER: male  : 1975  EPISODE DATE:  2023  Elements of this note, including Diagnosis,  Interval History, Past Medical/Surgical/Family/Social Histories, ROS, physical exam, and Assessment and Plan were copied and pasted from Previous. Updates have been made where noted and reflect current exam and medical decision making from the DOS of this encounter. Subjective:     Chief Complaint   Patient presents with    Wound Check     foot        HISTORY of PRESENT ILLNESS YAW Camacho is a 49 y.o. male who presents with   Chief Complaint   Patient presents with    Wound Check     foot    and has  has a past medical history of Decreased dorsalis pedis pulse, Decubitus ulcer due to spina bifida ([address]), Decubitus ulcer of back, stage 3 ([address]), Decubitus ulcer of ischial area, right, stage III ([address]), Pressure injury of lower back, stage 3 ([address]), Ulcer of foot, left, with fat layer exposed ([address]), and Ulcer of foot, right, with fat layer exposed ([address]). Pt is here for follow up evaluation and treatment of wound/ulcer. That patient's past medical, family and social hx were reviewed and changes were made if present. History of Wound Context:    PT HAS H/O SPINA BIFIDA  HAS SACRAL ULCERS  DEVELOPED B FOOT ULCERS SINCE 2023 WOUND CX BACK PSEUDOMONAS/MRSA/CORYNEBACTERIUM   FOOT WOUND CX PSEUDOMONAS/GPR/DIPTHEROIDS/BACTEROIDES  HE HAS NO F/C/N/V/D  HE IS HERE WITH HIS MOTHER           Current Outpatient Medications:     hydroCHLOROthiazide (MICROZIDE) 12.5 MG capsule, Take 1 capsule by mouth daily Pt unsure of dosage, Disp: , Rfl:     cefepime (MAXIPIME) infusion, Infuse 2,000 mg intravenously in the morning and 2,000 mg in the evening.  Compound per protocol., Disp: 168 g, Rfl: 0    metroNIDAZOLE (FLAGYL) 500 MG tablet, Take 1 tablet by mouth

## 2023-08-18 ENCOUNTER — HOSPITAL ENCOUNTER (OUTPATIENT)
Dept: WOUND CARE | Age: 48
Discharge: HOME | End: 2023-08-18

## 2023-08-22 NOTE — PROGRESS NOTES
Called pt on 8.18.23, spoke to arabella (given permission from patient) she stated that they want to put this off because insurance will not cover the medication. She asked if she can call back later after thinking about this. Called pt today 8.22.23 spoke to arabella again. She said that he is supposed to get hernia surgery soon so they don't think they are going to get this yet. She said that he sees the dr tomorrow so they will talk to dr.     I will be contacting dr today.

## 2023-08-23 NOTE — DISCHARGE INSTRUCTIONS
Visit Discharge/Physician Orders     Discharge condition: Stable     Assessment of pain at discharge: MILD     Anesthetic used: 4% LIDOCAINE     Discharge to: Home     Left via:Private automobile     Accompanied by: accompanied by mother     [facility]:  [facility]. Dressing Orders: Cleanse all wounds with Dakin's or Vashe. To left  foot wounds, and back wound, apply PLAIN alginate and secure with dry dressings. To right foot wound, pack with VASHE gauze packing and apply dry dressing to secure into place. change daily. To right ischium wound, begin to apply PLAIN alginate followed by dry dressing. Change daily. Home care to change once weekly. Begin to wear spandigrips bilaterally during the day, may remove at night. Pause acetic acid for now. Wound vac for the right foot to be placed after IV antibiotic therapy is started. ( Will need to be ordered)  Order for PICC line and IV cefepime Dr. Aidee Martin 8/17/2023      Treatment Orders: REPEAT WOUND CULTURE REVIEWED . Karley Plants Follow a nutritious diet. Choose foods high in protein: chicken, fish, and eggs. Choose foods high in Vitamin C. Multivitamin daily unless contraindicated. Offload back as often as possible. Try to change position every 2 hours. Offload feet with eggcrate cushions. wound care to request test results from dr Curt Sarah   Prescription for Dakin's given     [facility] followup visit ______2 weeks ___Beth _______________Dr. Aidee Martin  in  __8/31 at 1:15 at [facility]___  (Please note your next appointment above and if you are unable to keep, kindly give a 24 hour notice.  Thank you.)     Physician signature:__________________________        If you experience any of the following, please call the [address] [place] [address] during business hours:     * Increase in Pain  * Temperature over 101  * Increase in drainage from your wound  * Drainage with a foul odor  * Bleeding  * Increase in swelling  * Need for compression bandage

## 2023-08-25 ENCOUNTER — HOSPITAL ENCOUNTER (OUTPATIENT)
Dept: WOUND CARE | Age: 48
Discharge: HOME | End: 2023-08-25
Payer: MEDICARE

## 2023-08-25 VITALS
HEART RATE: 77 BPM | RESPIRATION RATE: 16 BRPM | WEIGHT: 230 LBS | HEIGHT: 63 IN | DIASTOLIC BLOOD PRESSURE: 84 MMHG | SYSTOLIC BLOOD PRESSURE: 134 MMHG | TEMPERATURE: 96.1 F | BODY MASS INDEX: 40.75 KG/M2

## 2023-08-25 DIAGNOSIS — L97.512 ULCER OF FOOT, RIGHT, WITH FAT LAYER EXPOSED (HCC): ICD-10-CM

## 2023-08-25 DIAGNOSIS — Q05.9 DECUBITUS ULCER DUE TO SPINA BIFIDA (HCC): Primary | ICD-10-CM

## 2023-08-25 DIAGNOSIS — L89.90 DECUBITUS ULCER DUE TO SPINA BIFIDA (HCC): Primary | ICD-10-CM

## 2023-08-25 DIAGNOSIS — L97.522 ULCER OF FOOT, LEFT, WITH FAT LAYER EXPOSED (HCC): ICD-10-CM

## 2023-08-25 DIAGNOSIS — L89.103 DECUBITUS ULCER OF BACK, STAGE 3 (HCC): ICD-10-CM

## 2023-08-25 DIAGNOSIS — L89.313 DECUBITUS ULCER OF ISCHIAL AREA, RIGHT, STAGE III (HCC): ICD-10-CM

## 2023-08-25 PROCEDURE — 11042 DBRDMT SUBQ TIS 1ST 20SQCM/<: CPT

## 2023-08-25 PROCEDURE — 6370000000 HC RX 637 (ALT 250 FOR IP): Performed by: SURGERY

## 2023-08-25 PROCEDURE — 11045 DBRDMT SUBQ TISS EACH ADDL: CPT

## 2023-08-25 RX ORDER — BACITRACIN ZINC AND POLYMYXIN B SULFATE 500; 1000 [USP'U]/G; [USP'U]/G
OINTMENT TOPICAL ONCE
OUTPATIENT
Start: 2023-08-25 | End: 2023-08-25

## 2023-08-25 RX ORDER — GINSENG 100 MG
CAPSULE ORAL ONCE
OUTPATIENT
Start: 2023-08-25 | End: 2023-08-25

## 2023-08-25 RX ORDER — LIDOCAINE 50 MG/G
OINTMENT TOPICAL ONCE
OUTPATIENT
Start: 2023-08-25 | End: 2023-08-25

## 2023-08-25 RX ORDER — LIDOCAINE HYDROCHLORIDE 40 MG/ML
SOLUTION TOPICAL ONCE
OUTPATIENT
Start: 2023-08-25 | End: 2023-08-25

## 2023-08-25 RX ORDER — GENTAMICIN SULFATE 1 MG/G
OINTMENT TOPICAL ONCE
OUTPATIENT
Start: 2023-08-25 | End: 2023-08-25

## 2023-08-25 RX ORDER — LIDOCAINE HYDROCHLORIDE 20 MG/ML
JELLY TOPICAL ONCE
OUTPATIENT
Start: 2023-08-25 | End: 2023-08-25

## 2023-08-25 RX ORDER — SODIUM HYPOCHLORITE 1.25 MG/ML
SOLUTION TOPICAL DAILY
Qty: 473 ML | Refills: 3 | Status: SHIPPED | OUTPATIENT
Start: 2023-08-25

## 2023-08-25 RX ORDER — LIDOCAINE 40 MG/G
CREAM TOPICAL ONCE
OUTPATIENT
Start: 2023-08-25 | End: 2023-08-25

## 2023-08-25 RX ORDER — SODIUM CHLOR/HYPOCHLOROUS ACID 0.033 %
SOLUTION, IRRIGATION IRRIGATION ONCE
OUTPATIENT
Start: 2023-08-25 | End: 2023-08-25

## 2023-08-25 RX ORDER — BETAMETHASONE DIPROPIONATE 0.05 %
OINTMENT (GRAM) TOPICAL ONCE
OUTPATIENT
Start: 2023-08-25 | End: 2023-08-25

## 2023-08-25 RX ORDER — LIDOCAINE HYDROCHLORIDE 40 MG/ML
SOLUTION TOPICAL ONCE
Status: COMPLETED | OUTPATIENT
Start: 2023-08-25 | End: 2023-08-25

## 2023-08-25 RX ORDER — IBUPROFEN 200 MG
TABLET ORAL ONCE
OUTPATIENT
Start: 2023-08-25 | End: 2023-08-25

## 2023-08-25 RX ORDER — CLOBETASOL PROPIONATE 0.5 MG/G
OINTMENT TOPICAL ONCE
OUTPATIENT
Start: 2023-08-25 | End: 2023-08-25

## 2023-08-25 RX ADMIN — LIDOCAINE HYDROCHLORIDE 10 ML: 40 SOLUTION TOPICAL at 14:02

## 2023-08-25 NOTE — PROGRESS NOTES
cm 08/25/23 1441   Post-Procedure Surface Area (cm^2) 45.6 cm^2 08/25/23 1441   Post-Procedure Volume (cm^3) 18.24 cm^3 08/25/23 1441   Wound Assessment Fibrin;Pink/red 08/25/23 1339   Drainage Amount Moderate (25-50%) 08/25/23 1339   Drainage Description Serosanguinous; Yellow 08/25/23 1339   Odor None 08/25/23 1339   Ceci-wound Assessment Intact 08/25/23 1339   Number of days: 48       Wound 07/07/23 Ischium Right new wound #2 right ischium (Active)   Wound Image   08/25/23 1403   Wound Etiology Pressure Stage 3 07/07/23 1449   Dressing Status New dressing applied 08/04/23 1514   Wound Cleansed Cleansed with saline 08/04/23 1514   Dressing/Treatment Alginate;ABD 08/04/23 1514   Wound Length (cm) 3.2 cm 08/25/23 1339   Wound Width (cm) 5.6 cm 08/25/23 1339   Wound Depth (cm) 1.2 cm 08/25/23 1339   Wound Surface Area (cm^2) 17.92 cm^2 08/25/23 1339   Change in Wound Size % (l*w) 56.56 08/25/23 1339   Wound Volume (cm^3) 21.504 cm^3 08/25/23 1339   Wound Healing % 83 08/25/23 1339   Post-Procedure Length (cm) 3.2 cm 08/25/23 1441   Post-Procedure Width (cm) 5.6 cm 08/25/23 1441   Post-Procedure Depth (cm) 1.3 cm 08/25/23 1441   Post-Procedure Surface Area (cm^2) 17.92 cm^2 08/25/23 1441   Post-Procedure Volume (cm^3) 23.296 cm^3 08/25/23 1441   Wound Assessment Pink/red;Pale granulation tissue 08/25/23 1339   Drainage Amount Moderate (25-50%) 08/25/23 1339   Drainage Description Serosanguinous; Yellow 08/25/23 1339   Odor None 08/25/23 1339   Ceci-wound Assessment Intact; Maceration 08/25/23 1339   Number of days: 48       Wound 07/07/23 Foot Left;Plantar new wound #3 left plantar 3rd met (Active)   Wound Image   08/25/23 1403   Dressing Status New dressing applied;Clean;Dry; Intact 08/17/23 1454   Wound Cleansed Cleansed with saline; Vashe 08/17/23 1454   Dressing/Treatment Alginate;Dry dressing 08/17/23 1454   Wound Length (cm) 1 cm 08/25/23 1339   Wound Width (cm) 1.4 cm 08/25/23 1339   Wound Depth (cm) 0.6 cm 08/25/23

## 2023-08-31 ENCOUNTER — HOSPITAL ENCOUNTER (OUTPATIENT)
Dept: WOUND CARE | Age: 48
Discharge: HOME | End: 2023-08-31
Payer: MEDICARE

## 2023-08-31 VITALS
SYSTOLIC BLOOD PRESSURE: 135 MMHG | RESPIRATION RATE: 16 BRPM | HEART RATE: 72 BPM | TEMPERATURE: 97.1 F | DIASTOLIC BLOOD PRESSURE: 70 MMHG

## 2023-08-31 PROCEDURE — 99213 OFFICE O/P EST LOW 20 MIN: CPT

## 2023-08-31 NOTE — PROGRESS NOTES
Treatment Note please see attached Discharge Instructions    Written patient dismissal instructions given to patient and signed by patient or POA. Discharge Instructions                              Visit Discharge/Physician Orders     Discharge condition: Stable     Assessment of pain at discharge: MILD     Anesthetic used: 4% LIDOCAINE     Discharge to: Home     Left via:Private automobile     Accompanied by: accompanied by mother     [facility]:  [facility]. Dressing Orders: Cleanse all wounds with Dakin's or Vashe. To left  foot wounds, and back wound, apply PLAIN alginate and secure with dry dressings. To right foot wound, pack with VASHE gauze packing and apply dry dressing to secure into place. change daily. To right ischium wound, begin to apply PLAIN alginate followed by dry dressing. Change daily. Home care to change once weekly. Begin to wear spandigrips bilaterally during the day, may remove at night. Pause acetic acid for now. No need for wound vac at this time cancel iv antibiotics     Treatment Orders: . Follow a nutritious diet. Choose foods high in protein: chicken, fish, and eggs. Choose foods high in Vitamin C. Multivitamin daily unless contraindicated. Offload back as often as possible. Try to change position every 2 hours. Offload feet with eggcrate cushions. wound care to request test results from dr Pinky Faust   Prescription for Dakin's given     [facility] followup visit ______1 weeks ___Beth _Loraine as needed__  (Please note your next appointment above and if you are unable to keep, kindly give a 24 hour notice.  Thank you.)     Physician signature:__________________________        If you experience any of the following, please call the [address] [place] [address] during business hours:     * Increase in Pain  * Temperature over 101  * Increase in drainage from your wound  * Drainage with a foul odor  * Bleeding  * Increase in swelling  * Need for compression

## 2023-09-05 NOTE — DISCHARGE INSTRUCTIONS
Visit Discharge/Physician Orders     Discharge condition: Stable     Assessment of pain at discharge: MILD     Anesthetic used: 4% LIDOCAINE     Discharge to: Home     Left via:Private automobile     Accompanied by: accompanied by mother     ECF/HHA:  [facility]      Dressing Orders: Cleanse all wounds with Dakin's or Vashe. To left  foot wounds, and back wound, apply PLAIN alginate and secure with dry dressings. To right foot wound, pack with VASHE  ( OR  DAKIN SOLUTION 1/4 PERCENT),  Then apply gauze packing and apply dry dressing to secure into place. change daily. ( PACK WOUND HERE LIGHTLY; DO NOT OVER STUFF PACKING INTO WOUND) To right ischium wound, begin to apply PLAIN alginate followed by dry dressing. Change daily. Home care to change once weekly. Begin to wear spandigrips bilaterally during the day, may remove at night. Pause acetic acid for now. CAN ALSO PAD EXTRA WITH FOAM PADDING TO PROTECT BACK AND ISCHIAL WOUNDS. No need for wound vac at this time cancel iv antibiotics     Treatment Orders: . Follow a nutritious diet. Choose foods high in protein: chicken, fish, and eggs. Choose foods high in Vitamin C. Multivitamin daily unless contraindicated. Offload back as often as possible. Try to change position every 2 hours. Offload feet with eggcrate cushions. [facility] followup visit ______2 weeks ___Beth Darden as needed__  (Please note your next appointment above and if you are unable to keep, kindly give a 24 hour notice. Thank you.)     Physician signature:__________________________        If you experience any of the following, please call the [address] [place] [address] during business hours:     * Increase in Pain  * Temperature over 101  * Increase in drainage from your wound  * Drainage with a foul odor  * Bleeding  * Increase in swelling  * Need for compression bandage changes due to slippage, breakthrough drainage.      If you need medical attention outside of the business hours of the [facility]

## 2023-09-08 ENCOUNTER — HOSPITAL ENCOUNTER (OUTPATIENT)
Dept: WOUND CARE | Age: 48
Discharge: HOME | End: 2023-09-08
Payer: MEDICARE

## 2023-09-08 VITALS
RESPIRATION RATE: 16 BRPM | TEMPERATURE: 96.2 F | BODY MASS INDEX: 40.75 KG/M2 | SYSTOLIC BLOOD PRESSURE: 110 MMHG | HEIGHT: 63 IN | WEIGHT: 230 LBS | HEART RATE: 84 BPM | DIASTOLIC BLOOD PRESSURE: 64 MMHG

## 2023-09-08 DIAGNOSIS — L89.103 DECUBITUS ULCER OF BACK, STAGE 3 (HCC): ICD-10-CM

## 2023-09-08 DIAGNOSIS — L89.313 DECUBITUS ULCER OF ISCHIAL AREA, RIGHT, STAGE III (HCC): ICD-10-CM

## 2023-09-08 DIAGNOSIS — Q05.9 DECUBITUS ULCER DUE TO SPINA BIFIDA (HCC): Primary | ICD-10-CM

## 2023-09-08 DIAGNOSIS — L97.512 ULCER OF FOOT, RIGHT, WITH FAT LAYER EXPOSED (HCC): ICD-10-CM

## 2023-09-08 DIAGNOSIS — L97.522 ULCER OF FOOT, LEFT, WITH FAT LAYER EXPOSED (HCC): ICD-10-CM

## 2023-09-08 DIAGNOSIS — L89.90 DECUBITUS ULCER DUE TO SPINA BIFIDA (HCC): Primary | ICD-10-CM

## 2023-09-08 PROCEDURE — 6370000000 HC RX 637 (ALT 250 FOR IP): Performed by: SURGERY

## 2023-09-08 RX ORDER — LIDOCAINE HYDROCHLORIDE 40 MG/ML
SOLUTION TOPICAL ONCE
OUTPATIENT
Start: 2023-09-08 | End: 2023-09-08

## 2023-09-08 RX ORDER — LIDOCAINE HYDROCHLORIDE 20 MG/ML
JELLY TOPICAL ONCE
OUTPATIENT
Start: 2023-09-08 | End: 2023-09-08

## 2023-09-08 RX ORDER — LIDOCAINE 40 MG/G
CREAM TOPICAL ONCE
OUTPATIENT
Start: 2023-09-08 | End: 2023-09-08

## 2023-09-08 RX ORDER — CLOBETASOL PROPIONATE 0.5 MG/G
OINTMENT TOPICAL ONCE
OUTPATIENT
Start: 2023-09-08 | End: 2023-09-08

## 2023-09-08 RX ORDER — BETAMETHASONE DIPROPIONATE 0.05 %
OINTMENT (GRAM) TOPICAL ONCE
OUTPATIENT
Start: 2023-09-08 | End: 2023-09-08

## 2023-09-08 RX ORDER — SODIUM CHLOR/HYPOCHLOROUS ACID 0.033 %
SOLUTION, IRRIGATION IRRIGATION ONCE
OUTPATIENT
Start: 2023-09-08 | End: 2023-09-08

## 2023-09-08 RX ORDER — LIDOCAINE HYDROCHLORIDE 40 MG/ML
SOLUTION TOPICAL ONCE
Status: COMPLETED | OUTPATIENT
Start: 2023-09-08 | End: 2023-09-08

## 2023-09-08 RX ORDER — BACITRACIN ZINC AND POLYMYXIN B SULFATE 500; 1000 [USP'U]/G; [USP'U]/G
OINTMENT TOPICAL ONCE
OUTPATIENT
Start: 2023-09-08 | End: 2023-09-08

## 2023-09-08 RX ORDER — IBUPROFEN 200 MG
TABLET ORAL ONCE
OUTPATIENT
Start: 2023-09-08 | End: 2023-09-08

## 2023-09-08 RX ORDER — GINSENG 100 MG
CAPSULE ORAL ONCE
OUTPATIENT
Start: 2023-09-08 | End: 2023-09-08

## 2023-09-08 RX ORDER — LIDOCAINE 50 MG/G
OINTMENT TOPICAL ONCE
OUTPATIENT
Start: 2023-09-08 | End: 2023-09-08

## 2023-09-08 RX ORDER — GENTAMICIN SULFATE 1 MG/G
OINTMENT TOPICAL ONCE
OUTPATIENT
Start: 2023-09-08 | End: 2023-09-08

## 2023-09-08 RX ADMIN — LIDOCAINE HYDROCHLORIDE 5 ML: 40 SOLUTION TOPICAL at 13:34

## 2023-09-08 NOTE — PROGRESS NOTES
A culture was not done. Plan:     Pt is not a smoker   In my professional opinion and based off the information that is available at this time this patient has appropriate indication for HBO Therapy: No    Treatment Note please see attached Discharge Instructions    Written patient dismissal instructions given to patient and signed by patient or POA. Discharge Instructions         Visit Discharge/Physician Orders     Discharge condition: Stable     Assessment of pain at discharge: MILD     Anesthetic used: 4% LIDOCAINE     Discharge to: Home     Left via:Private automobile     Accompanied by: accompanied by mother     ECF/HHA:  [facility]      Dressing Orders: Cleanse all wounds with Dakin's or Vashe. To left  foot wounds, and back wound, apply PLAIN alginate and secure with dry dressings. To right foot wound, pack with VASHE  ( OR  DAKIN SOLUTION 1/4 PERCENT),  Then apply gauze packing and apply dry dressing to secure into place. change daily. ( PACK WOUND HERE LIGHTLY; DO NOT OVER STUFF PACKING INTO WOUND) To right ischium wound, begin to apply PLAIN alginate followed by dry dressing. Change daily. Home care to change once weekly. Begin to wear spandigrips bilaterally during the day, may remove at night. Pause acetic acid for now. CAN ALSO PAD EXTRA WITH FOAM PADDING TO PROTECT BACK AND ISCHIAL WOUNDS. No need for wound vac at this time cancel iv antibiotics     Treatment Orders: . Follow a nutritious diet. Choose foods high in protein: chicken, fish, and eggs. Choose foods high in Vitamin C. Multivitamin daily unless contraindicated. Offload back as often as possible. Try to change position every 2 hours. Offload feet with eggcrate cushions. [facility] followup visit ______2 weeks ___Beth Darden as needed__  (Please note your next appointment above and if you are unable to keep, kindly give a 24 hour notice.  Thank you.)     Physician signature:__________________________        If you

## 2023-09-19 NOTE — DISCHARGE INSTRUCTIONS
breakthrough drainage. If you need medical attention outside of the business hours of the [address] [place] [address] please contact your PCP or go to the nearest emergency room.

## 2023-09-22 ENCOUNTER — HOSPITAL ENCOUNTER (OUTPATIENT)
Dept: WOUND CARE | Age: 48
Discharge: HOME | End: 2023-09-22
Payer: MEDICARE

## 2023-09-22 VITALS
TEMPERATURE: 96.4 F | RESPIRATION RATE: 16 BRPM | BODY MASS INDEX: 40.75 KG/M2 | WEIGHT: 230 LBS | SYSTOLIC BLOOD PRESSURE: 132 MMHG | HEIGHT: 63 IN | HEART RATE: 81 BPM | DIASTOLIC BLOOD PRESSURE: 90 MMHG

## 2023-09-22 DIAGNOSIS — Q05.9 DECUBITUS ULCER DUE TO SPINA BIFIDA (HCC): Primary | ICD-10-CM

## 2023-09-22 DIAGNOSIS — L97.512 ULCER OF FOOT, RIGHT, WITH FAT LAYER EXPOSED (HCC): ICD-10-CM

## 2023-09-22 DIAGNOSIS — L89.103 DECUBITUS ULCER OF BACK, STAGE 3 (HCC): ICD-10-CM

## 2023-09-22 DIAGNOSIS — L97.522 ULCER OF FOOT, LEFT, WITH FAT LAYER EXPOSED (HCC): ICD-10-CM

## 2023-09-22 DIAGNOSIS — L89.313 DECUBITUS ULCER OF ISCHIAL AREA, RIGHT, STAGE III (HCC): ICD-10-CM

## 2023-09-22 DIAGNOSIS — L89.90 DECUBITUS ULCER DUE TO SPINA BIFIDA (HCC): Primary | ICD-10-CM

## 2023-09-22 PROCEDURE — 11045 DBRDMT SUBQ TISS EACH ADDL: CPT

## 2023-09-22 PROCEDURE — 11042 DBRDMT SUBQ TIS 1ST 20SQCM/<: CPT

## 2023-09-22 PROCEDURE — 6370000000 HC RX 637 (ALT 250 FOR IP): Performed by: SURGERY

## 2023-09-22 RX ORDER — LIDOCAINE HYDROCHLORIDE 40 MG/ML
SOLUTION TOPICAL ONCE
OUTPATIENT
Start: 2023-09-22 | End: 2023-09-22

## 2023-09-22 RX ORDER — CLOBETASOL PROPIONATE 0.5 MG/G
OINTMENT TOPICAL ONCE
OUTPATIENT
Start: 2023-09-22 | End: 2023-09-22

## 2023-09-22 RX ORDER — GENTAMICIN SULFATE 1 MG/G
OINTMENT TOPICAL ONCE
OUTPATIENT
Start: 2023-09-22 | End: 2023-09-22

## 2023-09-22 RX ORDER — BACITRACIN ZINC AND POLYMYXIN B SULFATE 500; 1000 [USP'U]/G; [USP'U]/G
OINTMENT TOPICAL ONCE
OUTPATIENT
Start: 2023-09-22 | End: 2023-09-22

## 2023-09-22 RX ORDER — IBUPROFEN 200 MG
TABLET ORAL ONCE
OUTPATIENT
Start: 2023-09-22 | End: 2023-09-22

## 2023-09-22 RX ORDER — LIDOCAINE HYDROCHLORIDE 20 MG/ML
JELLY TOPICAL ONCE
OUTPATIENT
Start: 2023-09-22 | End: 2023-09-22

## 2023-09-22 RX ORDER — GINSENG 100 MG
CAPSULE ORAL ONCE
OUTPATIENT
Start: 2023-09-22 | End: 2023-09-22

## 2023-09-22 RX ORDER — SODIUM CHLOR/HYPOCHLOROUS ACID 0.033 %
SOLUTION, IRRIGATION IRRIGATION ONCE
OUTPATIENT
Start: 2023-09-22 | End: 2023-09-22

## 2023-09-22 RX ORDER — BETAMETHASONE DIPROPIONATE 0.05 %
OINTMENT (GRAM) TOPICAL ONCE
OUTPATIENT
Start: 2023-09-22 | End: 2023-09-22

## 2023-09-22 RX ORDER — LIDOCAINE 40 MG/G
CREAM TOPICAL ONCE
OUTPATIENT
Start: 2023-09-22 | End: 2023-09-22

## 2023-09-22 RX ORDER — TRIAMCINOLONE ACETONIDE 1 MG/G
OINTMENT TOPICAL ONCE
OUTPATIENT
Start: 2023-09-22 | End: 2023-09-22

## 2023-09-22 RX ORDER — LIDOCAINE 50 MG/G
OINTMENT TOPICAL ONCE
OUTPATIENT
Start: 2023-09-22 | End: 2023-09-22

## 2023-09-22 RX ORDER — LIDOCAINE HYDROCHLORIDE 40 MG/ML
SOLUTION TOPICAL ONCE
Status: COMPLETED | OUTPATIENT
Start: 2023-09-22 | End: 2023-09-22

## 2023-09-22 RX ADMIN — LIDOCAINE HYDROCHLORIDE 10 ML: 40 SOLUTION TOPICAL at 13:33

## 2023-09-22 NOTE — PROGRESS NOTES
Left;Plantar new wound #3 left plantar 3rd met (Active)   Wound Image   09/22/23 1311   Dressing Status New dressing applied 09/22/23 1425   Wound Cleansed Vashe 09/22/23 1425   Dressing/Treatment Alginate;Dry dressing 09/22/23 1425   Wound Length (cm) 0.7 cm 09/22/23 1338   Wound Width (cm) 0.7 cm 09/22/23 1338   Wound Depth (cm) 0.7 cm 09/22/23 1338   Wound Surface Area (cm^2) 0.49 cm^2 09/22/23 1338   Change in Wound Size % (l*w) 80.78 09/22/23 1338   Wound Volume (cm^3) 0.343 cm^3 09/22/23 1338   Wound Healing % 83 09/22/23 1338   Post-Procedure Length (cm) 1 cm 09/08/23 1450   Post-Procedure Width (cm) 0.7 cm 09/08/23 1450   Post-Procedure Depth (cm) 0.7 cm 09/08/23 1450   Post-Procedure Surface Area (cm^2) 0.7 cm^2 09/08/23 1450   Post-Procedure Volume (cm^3) 0.49 cm^3 09/08/23 1450   Wound Assessment Fibrin;Pink/red 09/22/23 1311   Drainage Amount Moderate (25-50%) 09/22/23 1311   Drainage Description Yellow 09/22/23 1311   Odor None 09/22/23 1311   Ceci-wound Assessment Fragile; Maceration 09/22/23 1311   Number of days: 77       Wound 07/07/23 Toe (Comment  which one) Left new #4 left great toe (Active)   Wound Image   09/22/23 1311   Wound Etiology Pressure Stage 2 07/07/23 1449   Dressing Status New dressing applied 09/22/23 1425   Wound Cleansed Vashe 09/22/23 1425   Dressing/Treatment Alginate;Dry dressing 09/22/23 1425   Wound Length (cm) 1 cm 09/22/23 1338   Wound Width (cm) 1.1 cm 09/22/23 1338   Wound Depth (cm) 0.2 cm 09/22/23 1338   Wound Surface Area (cm^2) 1.1 cm^2 09/22/23 1338   Change in Wound Size % (l*w) -41.03 09/22/23 1338   Wound Volume (cm^3) 0.22 cm^3 09/22/23 1338   Wound Healing % -182 09/22/23 1338   Post-Procedure Length (cm) 0.9 cm 09/08/23 1450   Post-Procedure Width (cm) 0.9 cm 09/08/23 1450   Post-Procedure Depth (cm) 0.2 cm 09/08/23 1450   Post-Procedure Surface Area (cm^2) 0.81 cm^2 09/08/23 1450   Post-Procedure Volume (cm^3) 0.162 cm^3 09/08/23 1450   Wound Assessment Fibrin

## 2023-09-26 NOTE — DISCHARGE INSTRUCTIONS
Visit Discharge/Physician Orders     Discharge condition: Stable     Assessment of pain at discharge: MILD     Anesthetic used: 4% LIDOCAINE     Discharge to: Home     Left via:Private automobile     Accompanied by: accompanied by mother     ECF/HHA:  [facility]      Dressing Orders: Cleanse all wounds with Dakin's or Vashe. To left  foot wounds, and back wound, apply PLAIN alginate and secure with dry dressings. To right foot wound, pack with VASHE  ( OR  DAKIN SOLUTION 1/4 PERCENT),   Then apply gauze packing and apply dry dressing to secure into place. change daily. ( PACK WOUND HERE LIGHTLY; DO NOT OVER STUFF PACKING INTO WOUND) To right ischium wound, begin to apply PLAIN alginate followed by dry dressing. Change daily. Home care to change once weekly. Begin to wear spandigrips bilaterally during the day, may remove at night. Pause acetic acid for now. CAN ALSO PAD EXTRA WITH FOAM PADDING TO PROTECT BACK AND ISCHIAL WOUNDS. No need for wound vac at this time     Treatment Orders: . Follow a nutritious diet. Choose foods high in protein: chicken, fish, and eggs. Choose foods high in Vitamin C. Multivitamin daily unless contraindicated. Offload back as often as possible. Try to change position every 2 hours. Offload feet with eggcrate cushions. [facility] followup visit __[place] 1weeks OR ____dr michaels as needed  (Please note your next appointment above and if you are unable to keep, kindly give a 24 hour notice. Thank you.)     Physician signature:__________________________        If you experience any of the following, please call the [facility] during business hours:     * Increase in Pain  * Temperature over 101  * Increase in drainage from your wound  * Drainage with a foul odor  * Bleeding  * Increase in swelling  * Need for compression bandage changes due to slippage, breakthrough drainage.      If you need medical attention outside of the business hours of the [facility] please

## 2023-09-28 ENCOUNTER — HOSPITAL ENCOUNTER (OUTPATIENT)
Dept: WOUND CARE | Age: 48
Discharge: HOME | End: 2023-09-28
Payer: MEDICARE

## 2023-09-28 VITALS
TEMPERATURE: 97.4 F | HEART RATE: 76 BPM | BODY MASS INDEX: 40.75 KG/M2 | HEIGHT: 63 IN | WEIGHT: 230 LBS | SYSTOLIC BLOOD PRESSURE: 138 MMHG | RESPIRATION RATE: 16 BRPM | DIASTOLIC BLOOD PRESSURE: 87 MMHG

## 2023-09-28 PROCEDURE — 99213 OFFICE O/P EST LOW 20 MIN: CPT

## 2023-09-28 RX ORDER — CLOBETASOL PROPIONATE 0.5 MG/G
OINTMENT TOPICAL ONCE
OUTPATIENT
Start: 2023-09-28 | End: 2023-09-28

## 2023-09-28 RX ORDER — GENTAMICIN SULFATE 1 MG/G
OINTMENT TOPICAL ONCE
OUTPATIENT
Start: 2023-09-28 | End: 2023-09-28

## 2023-09-28 RX ORDER — LIDOCAINE HYDROCHLORIDE 20 MG/ML
JELLY TOPICAL ONCE
OUTPATIENT
Start: 2023-09-28 | End: 2023-09-28

## 2023-09-28 RX ORDER — LIDOCAINE 40 MG/G
CREAM TOPICAL ONCE
OUTPATIENT
Start: 2023-09-28 | End: 2023-09-28

## 2023-09-28 RX ORDER — BACITRACIN ZINC AND POLYMYXIN B SULFATE 500; 1000 [USP'U]/G; [USP'U]/G
OINTMENT TOPICAL ONCE
OUTPATIENT
Start: 2023-09-28 | End: 2023-09-28

## 2023-09-28 RX ORDER — LIDOCAINE HYDROCHLORIDE 40 MG/ML
SOLUTION TOPICAL ONCE
OUTPATIENT
Start: 2023-09-28 | End: 2023-09-28

## 2023-09-28 RX ORDER — TRIAMCINOLONE ACETONIDE 1 MG/G
OINTMENT TOPICAL ONCE
OUTPATIENT
Start: 2023-09-28 | End: 2023-09-28

## 2023-09-28 RX ORDER — BACITRACIN ZINC 500 [USP'U]/G
OINTMENT TOPICAL ONCE
OUTPATIENT
Start: 2023-09-28 | End: 2023-09-28

## 2023-09-28 RX ORDER — SODIUM CHLOR/HYPOCHLOROUS ACID 0.033 %
SOLUTION, IRRIGATION IRRIGATION ONCE
OUTPATIENT
Start: 2023-09-28 | End: 2023-09-28

## 2023-09-28 RX ORDER — LIDOCAINE 50 MG/G
OINTMENT TOPICAL ONCE
OUTPATIENT
Start: 2023-09-28 | End: 2023-09-28

## 2023-09-28 RX ORDER — IBUPROFEN 200 MG
TABLET ORAL ONCE
OUTPATIENT
Start: 2023-09-28 | End: 2023-09-28

## 2023-09-28 RX ORDER — BETAMETHASONE DIPROPIONATE 0.05 %
OINTMENT (GRAM) TOPICAL ONCE
OUTPATIENT
Start: 2023-09-28 | End: 2023-09-28

## 2023-09-28 NOTE — PROGRESS NOTES
Wound Healing Center Followup Visit Note    Referring Physician : Nohemi Baird MD  [address] [place] [address] RECORD NUMBER:  [id number]  AGE: 49 y.o. GENDER: male  : 1975  EPISODE DATE:  2023  Elements of this note, including Diagnosis,  Interval History, Past Medical/Surgical/Family/Social Histories, ROS, physical exam, and Assessment and Plan were copied and pasted from Previous. Updates have been made where noted and reflect current exam and medical decision making from the DOS of this encounter. Subjective:     Chief Complaint   Patient presents with    Wound Check     Back, buttocks, and feet        HISTORY of PRESENT ILLNESS HPI   Jose Raul Stallings is a 49 y.o. male who presents with   Chief Complaint   Patient presents with    Wound Check     Back, buttocks, and feet    and has  has a past medical history of Decreased dorsalis pedis pulse, Decubitus ulcer due to spina bifida ([address]), Decubitus ulcer of back, stage 3 ([address]), Decubitus ulcer of ischial area, right, stage III ([address]), Pressure injury of lower back, stage 3 ([address]), Ulcer of foot, left, with fat layer exposed ([address]), and Ulcer of foot, right, with fat layer exposed ([address]). Pt is here for follow up evaluation and treatment of wound/ulcer. That patient's past medical, family and social hx were reviewed and changes were made if present.     History of Wound Context:    PT HAS H/O SPINA BIFIDA  HAS SACRAL ULCERS  DEVELOPED B FOOT ULCERS SINCE 2023 WOUND CX BACK PSEUDOMONAS/MRSA/CORYNEBACTERIUM   FOOT WOUND CX PSEUDOMONAS/GPR/DIPTHEROIDS/BACTEROIDES  HE HAS NO F/C/N/V/D  HE IS HERE WITH HIS MOTHER     DOS  2023  Pt is her with mother jaqueline c/o f/c/n/v/d/inc pain no inc draining   No atbx     2023  Currently on flagyl did not start cefepime  Did not start vacc  Using dakins and vashe to wound care          Current Outpatient Medications:     sodium hypochlorite (DAKINS) 0.125 % SOLN external solution, Apply topically

## 2023-09-28 NOTE — PLAN OF CARE
Problem: Pressure Ulcer:  Goal: Signs of wound healing will improve  Description: Signs of wound healing will improve  Outcome: Progressing  Goal: Absence of new pressure ulcer  Description: Absence of new pressure ulcer  Outcome: Progressing     Problem: Wound:  Goal: Will show signs of wound healing; wound closure and no evidence of infection  Description: Will show signs of wound healing; wound closure and no evidence of infection  Outcome: Progressing

## 2023-10-04 NOTE — DISCHARGE INSTRUCTIONS
Visit Discharge/Physician Orders     Discharge condition: Stable     Assessment of pain at discharge: MILD     Anesthetic used: 4% LIDOCAINE     Discharge to: Home     Left via:Private automobile     Accompanied by: accompanied by mother     ECF/HHA:  [facility]      Dressing Orders: To left foot wounds, and back wound, and right ischium wound- Cleanse with Vashe or Dakins, apply PLAIN alginate, cover and secure with dry dressings. Change daily. To right foot wound- Cleanse with Vashe or Dakins, pack lightly with gauze packing, cover and secure with dry dressing. Change daily. Home care to change once weekly. May pad wounds with foam for extra offloading     Begin to wear spandigrips bilaterally during the day, may remove at night. Treatment Orders: . Follow a nutritious diet. Choose foods high in protein: chicken, fish, and eggs. Choose foods high in Vitamin C. Multivitamin daily unless contraindicated. Offload back as often as possible. Try to change position every 2 hours. Offload feet with eggcrate cushions. [facility] followup visit ____2 weeks____dr Rochelle Dickerson as needed____  (Please note your next appointment above and if you are unable to keep, kindly give a 24 hour notice. Thank you.)     Physician signature:__________________________        If you experience any of the following, please call the [facility] during business hours:     * Increase in Pain  * Temperature over 101  * Increase in drainage from your wound  * Drainage with a foul odor  * Bleeding  * Increase in swelling  * Need for compression bandage changes due to slippage, breakthrough drainage. If you need medical attention outside of the business hours of the [facility] please contact your PCP or go to the nearest emergency room.

## 2023-10-06 ENCOUNTER — HOSPITAL ENCOUNTER (OUTPATIENT)
Dept: WOUND CARE | Age: 48
Discharge: HOME | End: 2023-10-06
Payer: MEDICARE

## 2023-10-06 VITALS
HEART RATE: 86 BPM | TEMPERATURE: 97.1 F | DIASTOLIC BLOOD PRESSURE: 88 MMHG | BODY MASS INDEX: 40.75 KG/M2 | SYSTOLIC BLOOD PRESSURE: 144 MMHG | RESPIRATION RATE: 18 BRPM | HEIGHT: 63 IN | WEIGHT: 230 LBS

## 2023-10-06 DIAGNOSIS — L97.522 ULCER OF FOOT, LEFT, WITH FAT LAYER EXPOSED (HCC): ICD-10-CM

## 2023-10-06 DIAGNOSIS — I89.0 LYMPHEDEMA OF BOTH LOWER EXTREMITIES: ICD-10-CM

## 2023-10-06 DIAGNOSIS — Q05.9 DECUBITUS ULCER DUE TO SPINA BIFIDA (HCC): Primary | ICD-10-CM

## 2023-10-06 DIAGNOSIS — L89.313 DECUBITUS ULCER OF ISCHIAL AREA, RIGHT, STAGE III (HCC): ICD-10-CM

## 2023-10-06 DIAGNOSIS — L89.103 DECUBITUS ULCER OF BACK, STAGE 3 (HCC): ICD-10-CM

## 2023-10-06 DIAGNOSIS — L89.90 DECUBITUS ULCER DUE TO SPINA BIFIDA (HCC): Primary | ICD-10-CM

## 2023-10-06 DIAGNOSIS — L97.512 ULCER OF FOOT, RIGHT, WITH FAT LAYER EXPOSED (HCC): ICD-10-CM

## 2023-10-06 PROCEDURE — 11045 DBRDMT SUBQ TISS EACH ADDL: CPT

## 2023-10-06 PROCEDURE — 11042 DBRDMT SUBQ TIS 1ST 20SQCM/<: CPT

## 2023-10-06 PROCEDURE — 6370000000 HC RX 637 (ALT 250 FOR IP): Performed by: SURGERY

## 2023-10-06 RX ORDER — GINSENG 100 MG
CAPSULE ORAL ONCE
OUTPATIENT
Start: 2023-10-06 | End: 2023-10-06

## 2023-10-06 RX ORDER — BACITRACIN ZINC AND POLYMYXIN B SULFATE 500; 1000 [USP'U]/G; [USP'U]/G
OINTMENT TOPICAL ONCE
OUTPATIENT
Start: 2023-10-06 | End: 2023-10-06

## 2023-10-06 RX ORDER — LIDOCAINE 50 MG/G
OINTMENT TOPICAL ONCE
OUTPATIENT
Start: 2023-10-06 | End: 2023-10-06

## 2023-10-06 RX ORDER — LIDOCAINE HYDROCHLORIDE 20 MG/ML
JELLY TOPICAL ONCE
OUTPATIENT
Start: 2023-10-06 | End: 2023-10-06

## 2023-10-06 RX ORDER — GENTAMICIN SULFATE 1 MG/G
OINTMENT TOPICAL ONCE
OUTPATIENT
Start: 2023-10-06 | End: 2023-10-06

## 2023-10-06 RX ORDER — LIDOCAINE 40 MG/G
CREAM TOPICAL ONCE
OUTPATIENT
Start: 2023-10-06 | End: 2023-10-06

## 2023-10-06 RX ORDER — CLOBETASOL PROPIONATE 0.5 MG/G
OINTMENT TOPICAL ONCE
OUTPATIENT
Start: 2023-10-06 | End: 2023-10-06

## 2023-10-06 RX ORDER — LIDOCAINE HYDROCHLORIDE 40 MG/ML
SOLUTION TOPICAL ONCE
OUTPATIENT
Start: 2023-10-06 | End: 2023-10-06

## 2023-10-06 RX ORDER — LIDOCAINE HYDROCHLORIDE 40 MG/ML
SOLUTION TOPICAL ONCE
Status: COMPLETED | OUTPATIENT
Start: 2023-10-06 | End: 2023-10-06

## 2023-10-06 RX ORDER — IBUPROFEN 200 MG
TABLET ORAL ONCE
OUTPATIENT
Start: 2023-10-06 | End: 2023-10-06

## 2023-10-06 RX ORDER — TRIAMCINOLONE ACETONIDE 1 MG/G
OINTMENT TOPICAL ONCE
OUTPATIENT
Start: 2023-10-06 | End: 2023-10-06

## 2023-10-06 RX ORDER — BETAMETHASONE DIPROPIONATE 0.05 %
OINTMENT (GRAM) TOPICAL ONCE
OUTPATIENT
Start: 2023-10-06 | End: 2023-10-06

## 2023-10-06 RX ORDER — SODIUM CHLOR/HYPOCHLOROUS ACID 0.033 %
SOLUTION, IRRIGATION IRRIGATION ONCE
OUTPATIENT
Start: 2023-10-06 | End: 2023-10-06

## 2023-10-06 RX ADMIN — LIDOCAINE HYDROCHLORIDE 5 ML: 40 SOLUTION ORAL at 13:39

## 2023-10-06 NOTE — PROGRESS NOTES
07/07/23 Back new wound #1 back pressure (Active)   Wound Image   09/22/23 1311   Wound Etiology Pressure Stage 3 07/07/23 1449   Dressing Status New dressing applied 10/06/23 1444   Wound Cleansed Cleansed with saline 10/06/23 1444   Dressing/Treatment Alginate;Foam;Silicone border 32/54/72 1444   Wound Length (cm) 5.8 cm 10/06/23 1322   Wound Width (cm) 7 cm 10/06/23 1322   Wound Depth (cm) 0.6 cm 10/06/23 1322   Wound Surface Area (cm^2) 40.6 cm^2 10/06/23 1322   Change in Wound Size % (l*w) 16.72 10/06/23 1322   Wound Volume (cm^3) 24.36 cm^3 10/06/23 1322   Wound Healing % -150 10/06/23 1322   Post-Procedure Length (cm) 5.8 cm 10/06/23 1400   Post-Procedure Width (cm) 7 cm 10/06/23 1400   Post-Procedure Depth (cm) 0.7 cm 10/06/23 1400   Post-Procedure Surface Area (cm^2) 40.6 cm^2 10/06/23 1400   Post-Procedure Volume (cm^3) 28.42 cm^3 10/06/23 1400   Wound Assessment Fibrin;Pink/red;Slough 10/06/23 1322   Drainage Amount Large (50-75% saturated) 10/06/23 1322   Drainage Description Yellow;Serosanguinous 10/06/23 1322   Odor Moderate 10/06/23 1322   Ceci-wound Assessment Fragile;Blanchable erythema; Maceration 10/06/23 1322   Number of days: 91       Wound 07/07/23 Ischium Right new wound #2 right ischium (Active)   Wound Image   09/22/23 1311   Wound Etiology Pressure Stage 3 07/07/23 1449   Dressing Status New dressing applied 10/06/23 1444   Wound Cleansed Cleansed with saline 10/06/23 1444   Dressing/Treatment Alginate;Silicone border 83/82/57 1444   Wound Length (cm) 2.2 cm 10/06/23 1322   Wound Width (cm) 6.3 cm 10/06/23 1322   Wound Depth (cm) 0.2 cm 10/06/23 1322   Wound Surface Area (cm^2) 13.86 cm^2 10/06/23 1322   Change in Wound Size % (l*w) 66.4 10/06/23 1322   Wound Volume (cm^3) 2.772 cm^3 10/06/23 1322   Wound Healing % 98 10/06/23 1322   Post-Procedure Length (cm) 2.2 cm 10/06/23 1400   Post-Procedure Width (cm) 6.3 cm 10/06/23 1400   Post-Procedure Depth (cm) 0.3 cm 10/06/23 1400

## 2023-10-06 NOTE — PLAN OF CARE
Problem: Wound:  Goal: Will show signs of wound healing; wound closure and no evidence of infection  Description: Will show signs of wound healing; wound closure and no evidence of infection  Outcome: Progressing     Problem: Pressure Ulcer:  Goal: Signs of wound healing will improve  Description: Signs of wound healing will improve  Outcome: Progressing  Goal: Will show no infection signs and symptoms  Description: Will show no infection signs and symptoms  Outcome: Progressing     Problem: Pressure Ulcer:  Goal: Absence of new pressure ulcer  Description: Absence of new pressure ulcer  Outcome: Adequate for Discharge

## 2023-10-17 NOTE — DISCHARGE INSTRUCTIONS
Visit Discharge/Physician Orders     Discharge condition: Stable     Assessment of pain at discharge: MILD     Anesthetic used: 4% LIDOCAINE     Discharge to: Home     Left via:Private automobile     Accompanied by: accompanied by mother     ECF/HHA:  [facility]      Dressing Orders: To left foot wounds, and back wound, and right ischium wound-(include the SCROTAL ulcer in this  treatment)  Cleanse with Vashe or Dakins, apply PLAIN alginate, cover and secure with dry dressings. Change daily. To right foot wound- continue to  Cleanse with Vashe or Dakins, pack lightly with gauze packing, cover and secure with dry dressing. Change daily. Home care to change once weekly. May pad wounds with foam for extra offloading      Begin to wear spandigrips bilaterally during the day, may remove at night. Treatment Orders: wound culture taken from scrotal area today. Follow a nutritious diet. Choose foods high in protein: chicken, fish, and eggs. Choose foods high in Vitamin C. Multivitamin daily unless contraindicated. Offload back as often as possible. Try to change position every 2 hours. Offload feet with eggcrate cushions. [facility] followup visit ____2 weeks_[place] ___dr Canda Kanner as needed____  (Please note your next appointment above and if you are unable to keep, kindly give a 24 hour notice. Thank you.)     Physician signature:__________________________        If you experience any of the following, please call the [facility] during business hours:     * Increase in Pain  * Temperature over 101  * Increase in drainage from your wound  * Drainage with a foul odor  * Bleeding  * Increase in swelling  * Need for compression bandage changes due to slippage, breakthrough drainage. If you need medical attention outside of the business hours of the [facility] please contact your PCP or go to the nearest emergency room.

## 2023-10-20 ENCOUNTER — HOSPITAL ENCOUNTER (OUTPATIENT)
Dept: WOUND CARE | Age: 48
Discharge: HOME | End: 2023-10-20
Payer: MEDICARE

## 2023-10-20 VITALS
HEIGHT: 63 IN | DIASTOLIC BLOOD PRESSURE: 96 MMHG | HEART RATE: 85 BPM | BODY MASS INDEX: 40.75 KG/M2 | TEMPERATURE: 96 F | RESPIRATION RATE: 18 BRPM | SYSTOLIC BLOOD PRESSURE: 157 MMHG | WEIGHT: 230 LBS

## 2023-10-20 DIAGNOSIS — Q05.9 DECUBITUS ULCER DUE TO SPINA BIFIDA (HCC): Primary | ICD-10-CM

## 2023-10-20 DIAGNOSIS — L97.522 ULCER OF FOOT, LEFT, WITH FAT LAYER EXPOSED (HCC): ICD-10-CM

## 2023-10-20 DIAGNOSIS — L89.90 DECUBITUS ULCER DUE TO SPINA BIFIDA (HCC): Primary | ICD-10-CM

## 2023-10-20 DIAGNOSIS — L89.313 DECUBITUS ULCER OF ISCHIAL AREA, RIGHT, STAGE III (HCC): ICD-10-CM

## 2023-10-20 DIAGNOSIS — L97.512 ULCER OF FOOT, RIGHT, WITH FAT LAYER EXPOSED (HCC): ICD-10-CM

## 2023-10-20 DIAGNOSIS — L89.103 DECUBITUS ULCER OF BACK, STAGE 3 (HCC): ICD-10-CM

## 2023-10-20 PROCEDURE — 11045 DBRDMT SUBQ TISS EACH ADDL: CPT

## 2023-10-20 PROCEDURE — 6370000000 HC RX 637 (ALT 250 FOR IP): Performed by: SURGERY

## 2023-10-20 PROCEDURE — 87070 CULTURE OTHR SPECIMN AEROBIC: CPT

## 2023-10-20 PROCEDURE — 87205 SMEAR GRAM STAIN: CPT

## 2023-10-20 PROCEDURE — 11042 DBRDMT SUBQ TIS 1ST 20SQCM/<: CPT

## 2023-10-20 RX ORDER — LIDOCAINE HYDROCHLORIDE 40 MG/ML
SOLUTION TOPICAL ONCE
OUTPATIENT
Start: 2023-10-20 | End: 2023-10-20

## 2023-10-20 RX ORDER — TRIAMCINOLONE ACETONIDE 1 MG/G
OINTMENT TOPICAL ONCE
OUTPATIENT
Start: 2023-10-20 | End: 2023-10-20

## 2023-10-20 RX ORDER — GINSENG 100 MG
CAPSULE ORAL ONCE
OUTPATIENT
Start: 2023-10-20 | End: 2023-10-20

## 2023-10-20 RX ORDER — BETAMETHASONE DIPROPIONATE 0.05 %
OINTMENT (GRAM) TOPICAL ONCE
OUTPATIENT
Start: 2023-10-20 | End: 2023-10-20

## 2023-10-20 RX ORDER — CLOBETASOL PROPIONATE 0.5 MG/G
OINTMENT TOPICAL ONCE
OUTPATIENT
Start: 2023-10-20 | End: 2023-10-20

## 2023-10-20 RX ORDER — SODIUM CHLOR/HYPOCHLOROUS ACID 0.033 %
SOLUTION, IRRIGATION IRRIGATION ONCE
OUTPATIENT
Start: 2023-10-20 | End: 2023-10-20

## 2023-10-20 RX ORDER — BACITRACIN ZINC AND POLYMYXIN B SULFATE 500; 1000 [USP'U]/G; [USP'U]/G
OINTMENT TOPICAL ONCE
OUTPATIENT
Start: 2023-10-20 | End: 2023-10-20

## 2023-10-20 RX ORDER — LIDOCAINE HYDROCHLORIDE 20 MG/ML
JELLY TOPICAL ONCE
OUTPATIENT
Start: 2023-10-20 | End: 2023-10-20

## 2023-10-20 RX ORDER — IBUPROFEN 200 MG
TABLET ORAL ONCE
OUTPATIENT
Start: 2023-10-20 | End: 2023-10-20

## 2023-10-20 RX ORDER — LIDOCAINE 50 MG/G
OINTMENT TOPICAL ONCE
OUTPATIENT
Start: 2023-10-20 | End: 2023-10-20

## 2023-10-20 RX ORDER — LIDOCAINE 40 MG/G
CREAM TOPICAL ONCE
OUTPATIENT
Start: 2023-10-20 | End: 2023-10-20

## 2023-10-20 RX ORDER — GENTAMICIN SULFATE 1 MG/G
OINTMENT TOPICAL ONCE
OUTPATIENT
Start: 2023-10-20 | End: 2023-10-20

## 2023-10-20 RX ORDER — LIDOCAINE HYDROCHLORIDE 40 MG/ML
SOLUTION TOPICAL ONCE
Status: COMPLETED | OUTPATIENT
Start: 2023-10-20 | End: 2023-10-20

## 2023-10-20 RX ADMIN — LIDOCAINE HYDROCHLORIDE 10 ML: 40 SOLUTION TOPICAL at 13:10

## 2023-10-20 NOTE — PROGRESS NOTES
Wound Healing Center /Hyperbarics   History and Physical/Consultation  Vascular    Referring Physician : Gasper Mccartney MD  [address] [place] [address] RECORD NUMBER:  [id number]  AGE: 51 y.o. GENDER: male  : 1975  EPISODE DATE:  10/20/2023  Subjective:     Chief Complaint   Patient presents with    Wound Check     Buttocks, B feet         HISTORY of PRESENT ILLNESS YAW Gonzalez is a 51 y.o. male who presents today for wound/ulcer evaluation. History of Wound Context:  The patient has had a wounds of both feet, right ischium, back pressure ulcers between the junction of the lumbar spine and thoracic spine which was first noted approximately at least 3 to 4 months, patient and his mother tell me that they noticed that when the patient got home from a extended care facility but they do not recall how long he had ulcers,. This has been treated at the [facility] wound [facility] center and family came to [facility], and in the past he was treated here with good results. On their initial visit to the wound healing center, 23, the patient has noted that the wound has not been improving. The patient has had similar previous wounds in the past.        Patient has spina bifida with hydrocephalus, has undergone previous surgeries multiple including peritoneal venous shunt for hydrocephalus in the [place], has paraplegia, does not ambulate, recently underwent insertion of suprapubic catheter    Pt is currently not on abx.       Wound/Ulcer Pain Timing/Severity: constant  Quality of pain: dull, aching  Severity:  3 / 10   Modifying Factors: None  Associated Signs/Symptoms: drainage and pain    Ulcer Identification:  Ulcer Type: pressure and non-healing/non-surgical  Contributing Factors: lymphedema, chronic pressure, decreased mobility, and shear force    Diabetic/Pressure/Non Pressure Ulcers only:  Ulcer: N/A    If patient has diabetic lower extremity wounds  [place] Classification of diabetic

## 2023-10-20 NOTE — PLAN OF CARE
Problem: Pressure Ulcer:  Goal: Absence of new pressure ulcer  Description: Absence of new pressure ulcer  Outcome: Not Progressing     Problem: Pressure Ulcer:  Goal: Absence of new pressure ulcer  Description: Absence of new pressure ulcer  Outcome: Not Progressing

## 2023-10-22 LAB
MICROORGANISM SPEC CULT: ABNORMAL
MICROORGANISM/AGENT SPEC: ABNORMAL
SPECIMEN DESCRIPTION: ABNORMAL

## 2023-11-03 ENCOUNTER — HOSPITAL ENCOUNTER (OUTPATIENT)
Dept: WOUND CARE | Age: 48
Discharge: HOME | End: 2023-11-03
Payer: MEDICARE

## 2023-11-03 VITALS
DIASTOLIC BLOOD PRESSURE: 78 MMHG | TEMPERATURE: 97 F | SYSTOLIC BLOOD PRESSURE: 126 MMHG | RESPIRATION RATE: 18 BRPM | HEART RATE: 80 BPM

## 2023-11-03 DIAGNOSIS — L97.522 ULCER OF FOOT, LEFT, WITH FAT LAYER EXPOSED (HCC): ICD-10-CM

## 2023-11-03 DIAGNOSIS — L89.323 DECUBITUS ULCER OF LEFT ISCHIAL AREA, STAGE III (HCC): ICD-10-CM

## 2023-11-03 DIAGNOSIS — L89.103 DECUBITUS ULCER OF BACK, STAGE 3 (HCC): ICD-10-CM

## 2023-11-03 DIAGNOSIS — Q05.9 DECUBITUS ULCER DUE TO SPINA BIFIDA (HCC): Primary | ICD-10-CM

## 2023-11-03 DIAGNOSIS — L89.90 DECUBITUS ULCER DUE TO SPINA BIFIDA (HCC): Primary | ICD-10-CM

## 2023-11-03 DIAGNOSIS — L89.313 DECUBITUS ULCER OF ISCHIAL AREA, RIGHT, STAGE III (HCC): ICD-10-CM

## 2023-11-03 DIAGNOSIS — L97.512 ULCER OF FOOT, RIGHT, WITH FAT LAYER EXPOSED (HCC): ICD-10-CM

## 2023-11-03 PROCEDURE — 11045 DBRDMT SUBQ TISS EACH ADDL: CPT

## 2023-11-03 PROCEDURE — 6370000000 HC RX 637 (ALT 250 FOR IP): Performed by: SURGERY

## 2023-11-03 PROCEDURE — 11042 DBRDMT SUBQ TIS 1ST 20SQCM/<: CPT

## 2023-11-03 RX ORDER — LIDOCAINE HYDROCHLORIDE 40 MG/ML
SOLUTION TOPICAL ONCE
OUTPATIENT
Start: 2023-11-03 | End: 2023-11-03

## 2023-11-03 RX ORDER — BACITRACIN ZINC AND POLYMYXIN B SULFATE 500; 1000 [USP'U]/G; [USP'U]/G
OINTMENT TOPICAL ONCE
Status: CANCELLED | OUTPATIENT
Start: 2023-11-03 | End: 2023-11-03

## 2023-11-03 RX ORDER — BETAMETHASONE DIPROPIONATE 0.5 MG/G
CREAM TOPICAL ONCE
OUTPATIENT
Start: 2023-11-03 | End: 2023-11-03

## 2023-11-03 RX ORDER — GINSENG 100 MG
CAPSULE ORAL ONCE
OUTPATIENT
Start: 2023-11-03 | End: 2023-11-03

## 2023-11-03 RX ORDER — IBUPROFEN 200 MG
TABLET ORAL ONCE
OUTPATIENT
Start: 2023-11-03 | End: 2023-11-03

## 2023-11-03 RX ORDER — LIDOCAINE 50 MG/G
OINTMENT TOPICAL ONCE
Status: CANCELLED | OUTPATIENT
Start: 2023-11-03 | End: 2023-11-03

## 2023-11-03 RX ORDER — LIDOCAINE HYDROCHLORIDE 20 MG/ML
JELLY TOPICAL ONCE
OUTPATIENT
Start: 2023-11-03 | End: 2023-11-03

## 2023-11-03 RX ORDER — IBUPROFEN 200 MG
TABLET ORAL ONCE
Status: CANCELLED | OUTPATIENT
Start: 2023-11-03 | End: 2023-11-03

## 2023-11-03 RX ORDER — LIDOCAINE HYDROCHLORIDE 20 MG/ML
JELLY TOPICAL ONCE
Status: CANCELLED | OUTPATIENT
Start: 2023-11-03 | End: 2023-11-03

## 2023-11-03 RX ORDER — LIDOCAINE 40 MG/G
CREAM TOPICAL ONCE
OUTPATIENT
Start: 2023-11-03 | End: 2023-11-03

## 2023-11-03 RX ORDER — LIDOCAINE HYDROCHLORIDE 40 MG/ML
SOLUTION TOPICAL ONCE
Status: CANCELLED | OUTPATIENT
Start: 2023-11-03 | End: 2023-11-03

## 2023-11-03 RX ORDER — GENTAMICIN SULFATE 1 MG/G
OINTMENT TOPICAL ONCE
OUTPATIENT
Start: 2023-11-03 | End: 2023-11-03

## 2023-11-03 RX ORDER — GENTAMICIN SULFATE 1 MG/G
OINTMENT TOPICAL ONCE
Status: CANCELLED | OUTPATIENT
Start: 2023-11-03 | End: 2023-11-03

## 2023-11-03 RX ORDER — BACITRACIN ZINC AND POLYMYXIN B SULFATE 500; 1000 [USP'U]/G; [USP'U]/G
OINTMENT TOPICAL ONCE
OUTPATIENT
Start: 2023-11-03 | End: 2023-11-03

## 2023-11-03 RX ORDER — LIDOCAINE 40 MG/G
CREAM TOPICAL ONCE
Status: CANCELLED | OUTPATIENT
Start: 2023-11-03 | End: 2023-11-03

## 2023-11-03 RX ORDER — LIDOCAINE HYDROCHLORIDE 40 MG/ML
SOLUTION TOPICAL ONCE
Status: COMPLETED | OUTPATIENT
Start: 2023-11-03 | End: 2023-11-03

## 2023-11-03 RX ORDER — BETAMETHASONE DIPROPIONATE 0.05 %
OINTMENT (GRAM) TOPICAL ONCE
Status: CANCELLED | OUTPATIENT
Start: 2023-11-03 | End: 2023-11-03

## 2023-11-03 RX ORDER — SODIUM CHLOR/HYPOCHLOROUS ACID 0.033 %
SOLUTION, IRRIGATION IRRIGATION ONCE
Status: CANCELLED | OUTPATIENT
Start: 2023-11-03 | End: 2023-11-03

## 2023-11-03 RX ORDER — CLOBETASOL PROPIONATE 0.5 MG/G
OINTMENT TOPICAL ONCE
OUTPATIENT
Start: 2023-11-03 | End: 2023-11-03

## 2023-11-03 RX ORDER — TRIAMCINOLONE ACETONIDE 1 MG/G
OINTMENT TOPICAL ONCE
Status: CANCELLED | OUTPATIENT
Start: 2023-11-03 | End: 2023-11-03

## 2023-11-03 RX ORDER — SODIUM CHLOR/HYPOCHLOROUS ACID 0.033 %
SOLUTION, IRRIGATION IRRIGATION ONCE
OUTPATIENT
Start: 2023-11-03 | End: 2023-11-03

## 2023-11-03 RX ORDER — TRIAMCINOLONE ACETONIDE 1 MG/G
OINTMENT TOPICAL ONCE
OUTPATIENT
Start: 2023-11-03 | End: 2023-11-03

## 2023-11-03 RX ORDER — CLOBETASOL PROPIONATE 0.5 MG/G
OINTMENT TOPICAL ONCE
Status: CANCELLED | OUTPATIENT
Start: 2023-11-03 | End: 2023-11-03

## 2023-11-03 RX ORDER — LIDOCAINE 50 MG/G
OINTMENT TOPICAL ONCE
OUTPATIENT
Start: 2023-11-03 | End: 2023-11-03

## 2023-11-03 RX ORDER — GINSENG 100 MG
CAPSULE ORAL ONCE
Status: CANCELLED | OUTPATIENT
Start: 2023-11-03 | End: 2023-11-03

## 2023-11-03 RX ADMIN — LIDOCAINE HYDROCHLORIDE 4 ML: 40 SOLUTION TOPICAL at 13:48

## 2023-11-03 NOTE — PLAN OF CARE
Problem: Pressure Ulcer:  Goal: Signs of wound healing will improve  Description: Signs of wound healing will improve  Outcome: Progressing  Goal: Will show no infection signs and symptoms  Description: Will show no infection signs and symptoms  Outcome: Progressing     Problem: Wound:  Goal: Will show signs of wound healing; wound closure and no evidence of infection  Description: Will show signs of wound healing; wound closure and no evidence of infection  Outcome: Progressing     Problem: Pressure Ulcer:  Goal: Absence of new pressure ulcer  Description: Absence of new pressure ulcer  Outcome: Adequate for Discharge

## 2023-11-03 NOTE — PROGRESS NOTES
Pink/red 11/03/23 1315   Drainage Amount Scant (moist but unmeasurable) 11/03/23 1315   Drainage Description Serosanguinous 11/03/23 1315   Odor None 11/03/23 1315   Ceci-wound Assessment Intact 11/03/23 1315   Number of days: 119       Wound 07/07/23 Foot Plantar;Right new wound #5 right foot plantar (Active)   Wound Image   11/03/23 1315   Dressing Status New dressing applied 11/03/23 1503   Wound Cleansed Vashe 11/03/23 1503   Dressing/Treatment Other (comment) 11/03/23 1503   Wound Length (cm) 2 cm 11/03/23 1315   Wound Width (cm) 0.2 cm 11/03/23 1315   Wound Depth (cm) 0.7 cm 11/03/23 1315   Wound Surface Area (cm^2) 0.4 cm^2 11/03/23 1315   Change in Wound Size % (l*w) 92.45 11/03/23 1315   Wound Volume (cm^3) 0.28 cm^3 11/03/23 1315   Wound Healing % 97 11/03/23 1315   Post-Procedure Length (cm) 2 cm 11/03/23 1401   Post-Procedure Width (cm) 0.2 cm 11/03/23 1401   Post-Procedure Depth (cm) 0.7 cm 11/03/23 1401   Post-Procedure Surface Area (cm^2) 0.4 cm^2 11/03/23 1401   Post-Procedure Volume (cm^3) 0.28 cm^3 11/03/23 1401   Undermining Starts ___ O'Clock 0 10/20/23 1310   Undermining Ends___ O'Clock 0 10/20/23 1310   Undermining Maxium Distance (cm) 0 10/20/23 1310   Wound Assessment Pink/red 11/03/23 1315   Drainage Amount Scant (moist but unmeasurable) 11/03/23 1315   Drainage Description Yellow 11/03/23 1315   Odor None 11/03/23 1315   Ceci-wound Assessment Fragile 11/03/23 1315   Number of days: 119       Wound 11/03/23 Ischium Left new wound, #6 left ischium (Active)   Wound Image   11/03/23 1315   Wound Etiology Pressure Stage 3 11/03/23 1315   Dressing Status New dressing applied 11/03/23 1503   Wound Cleansed Cleansed with saline 11/03/23 1503   Dressing/Treatment Alginate;Foam;Silicone border 14/37/40 1503   Wound Length (cm) 4 cm 11/03/23 1315   Wound Width (cm) 3.5 cm 11/03/23 1315   Wound Depth (cm) 0.1 cm 11/03/23 1315   Wound Surface Area (cm^2) 14 cm^2 11/03/23 1315   Wound Volume (cm^3) 1.4

## 2023-11-15 NOTE — DISCHARGE INSTRUCTIONS
Visit Discharge/Physician Orders     Discharge condition: Stable     Assessment of pain at discharge: MILD     Anesthetic used: 4% LIDOCAINE     Discharge to: Home     Left via:Private automobile     Accompanied by: accompanied by mother     ECF/HHA:  [facility]      Dressing Orders: To left foot wounds, and back wound-Cleanse with Vashe or Dakins, apply PLAIN alginate, cover and secure with dry dressings. Change daily. Right ischium wound and left ischium wound- Cleanse with Vashe or Dakins, apply santyl (nickel thick), cover and secure with dry dressing. Change daily. To right foot wound- continue to  Cleanse with Vashe or Dakins, pack lightly with gauze packing, cover and secure with dry dressing. Change daily. Home care to change once weekly. May pad wounds with foam for extra offloading      Begin to wear spandigrips bilaterally during the day, may remove at night. Treatment Orders: Follow a nutritious diet. Choose foods high in protein: chicken, fish, and eggs. Choose foods high in Vitamin C. Multivitamin daily unless contraindicated. Offload back as often as possible. Try to change position every 2 hours. Offload feet with eggcrate cushions. [facility] followup visit ___to er for eval___  (Please note your next appointment above and if you are unable to keep, kindly give a 24 hour notice. Thank you.)     Physician signature:__________________________        If you experience any of the following, please call the [facility] during business hours:     * Increase in Pain  * Temperature over 101  * Increase in drainage from your wound  * Drainage with a foul odor  * Bleeding  * Increase in swelling  * Need for compression bandage changes due to slippage, breakthrough drainage. If you need medical attention outside of the business hours of the [facility] please contact your PCP or go to the nearest emergency room.

## 2023-11-17 ENCOUNTER — HOSPITAL ENCOUNTER (OUTPATIENT)
Dept: WOUND CARE | Age: 48
Discharge: HOME | End: 2023-11-17
Payer: MEDICARE

## 2023-11-17 ENCOUNTER — APPOINTMENT (OUTPATIENT)
Dept: CT IMAGING | Age: 48
DRG: 040 | End: 2023-11-17
Payer: MEDICARE

## 2023-11-17 ENCOUNTER — HOSPITAL ENCOUNTER (INPATIENT)
Age: 48
LOS: 1 days | Discharge: OTHER INSTITUTION | DRG: 040 | End: 2023-11-19
Attending: EMERGENCY MEDICINE | Admitting: INTERNAL MEDICINE
Payer: MEDICARE

## 2023-11-17 VITALS
DIASTOLIC BLOOD PRESSURE: 91 MMHG | SYSTOLIC BLOOD PRESSURE: 147 MMHG | TEMPERATURE: 96.7 F | HEART RATE: 78 BPM | RESPIRATION RATE: 18 BRPM

## 2023-11-17 DIAGNOSIS — L89.320: ICD-10-CM

## 2023-11-17 DIAGNOSIS — L89.313 DECUBITUS ULCER OF ISCHIAL AREA, RIGHT, STAGE III (HCC): ICD-10-CM

## 2023-11-17 DIAGNOSIS — R09.89 DECREASED DORSALIS PEDIS PULSE: Primary | ICD-10-CM

## 2023-11-17 DIAGNOSIS — Q05.4 SPINA BIFIDA WITH HYDROCEPHALUS, UNSPECIFIED SPINAL REGION (HCC): ICD-10-CM

## 2023-11-17 DIAGNOSIS — L89.90 DECUBITUS ULCER DUE TO SPINA BIFIDA (HCC): ICD-10-CM

## 2023-11-17 DIAGNOSIS — I89.0 LYMPHEDEMA OF BOTH LOWER EXTREMITIES: ICD-10-CM

## 2023-11-17 DIAGNOSIS — L97.512 ULCER OF FOOT, RIGHT, WITH FAT LAYER EXPOSED (HCC): ICD-10-CM

## 2023-11-17 DIAGNOSIS — L89.103 DECUBITUS ULCER OF BACK, STAGE 3 (HCC): ICD-10-CM

## 2023-11-17 DIAGNOSIS — S31.000A WOUND OF SACRAL REGION, INITIAL ENCOUNTER: Primary | ICD-10-CM

## 2023-11-17 DIAGNOSIS — Q05.9 DECUBITUS ULCER DUE TO SPINA BIFIDA (HCC): ICD-10-CM

## 2023-11-17 DIAGNOSIS — L97.522 ULCER OF FOOT, LEFT, WITH FAT LAYER EXPOSED (HCC): ICD-10-CM

## 2023-11-17 DIAGNOSIS — N50.89 SCROTAL ULCER: ICD-10-CM

## 2023-11-17 PROBLEM — L89.323: Status: RESOLVED | Noted: 2023-11-03 | Resolved: 2023-11-17

## 2023-11-17 PROBLEM — R89.5 POSITIVE CULTURE FINDINGS IN WOUND: Status: RESOLVED | Noted: 2023-07-21 | Resolved: 2023-11-17

## 2023-11-17 LAB
ALBUMIN SERPL-MCNC: 4 G/DL (ref 3.5–5.2)
ALP SERPL-CCNC: 122 U/L (ref 40–129)
ALT SERPL-CCNC: 14 U/L (ref 0–40)
ANION GAP SERPL CALCULATED.3IONS-SCNC: 9 MMOL/L (ref 7–16)
AST SERPL-CCNC: 16 U/L (ref 0–39)
BASOPHILS # BLD: 0.02 K/UL (ref 0–0.2)
BASOPHILS NFR BLD: 0 % (ref 0–2)
BILIRUB SERPL-MCNC: 0.3 MG/DL (ref 0–1.2)
BUN SERPL-MCNC: 17 MG/DL (ref 6–20)
CALCIUM SERPL-MCNC: 10 MG/DL (ref 8.6–10.2)
CHLORIDE SERPL-SCNC: 95 MMOL/L (ref 98–107)
CO2 SERPL-SCNC: 37 MMOL/L (ref 22–29)
CREAT SERPL-MCNC: 0.5 MG/DL (ref 0.7–1.2)
EOSINOPHIL # BLD: 0.34 K/UL (ref 0.05–0.5)
EOSINOPHILS RELATIVE PERCENT: 4 % (ref 0–6)
ERYTHROCYTE [DISTWIDTH] IN BLOOD BY AUTOMATED COUNT: 15.7 % (ref 11.5–15)
GFR SERPL CREATININE-BSD FRML MDRD: >60 ML/MIN/1.73M2
GLUCOSE SERPL-MCNC: 121 MG/DL (ref 74–99)
HCT VFR BLD AUTO: 41.9 % (ref 37–54)
HGB BLD-MCNC: 13.3 G/DL (ref 12.5–16.5)
IMM GRANULOCYTES # BLD AUTO: 0.03 K/UL (ref 0–0.58)
IMM GRANULOCYTES NFR BLD: 0 % (ref 0–5)
LACTATE BLDV-SCNC: 1.8 MMOL/L (ref 0.5–2.2)
LYMPHOCYTES NFR BLD: 1.21 K/UL (ref 1.5–4)
LYMPHOCYTES RELATIVE PERCENT: 13 % (ref 20–42)
MCH RBC QN AUTO: 30.8 PG (ref 26–35)
MCHC RBC AUTO-ENTMCNC: 31.7 G/DL (ref 32–34.5)
MCV RBC AUTO: 97 FL (ref 80–99.9)
MONOCYTES NFR BLD: 0.68 K/UL (ref 0.1–0.95)
MONOCYTES NFR BLD: 7 % (ref 2–12)
NEUTROPHILS NFR BLD: 76 % (ref 43–80)
NEUTS SEG NFR BLD: 7.06 K/UL (ref 1.8–7.3)
PLATELET # BLD AUTO: 217 K/UL (ref 130–450)
PMV BLD AUTO: 10.1 FL (ref 7–12)
POTASSIUM SERPL-SCNC: 3.6 MMOL/L (ref 3.5–5)
PROT SERPL-MCNC: 8.7 G/DL (ref 6.4–8.3)
RBC # BLD AUTO: 4.32 M/UL (ref 3.8–5.8)
SODIUM SERPL-SCNC: 141 MMOL/L (ref 132–146)
WBC OTHER # BLD: 9.3 K/UL (ref 4.5–11.5)

## 2023-11-17 PROCEDURE — 86140 C-REACTIVE PROTEIN: CPT

## 2023-11-17 PROCEDURE — 2580000003 HC RX 258: Performed by: EMERGENCY MEDICINE

## 2023-11-17 PROCEDURE — 87070 CULTURE OTHR SPECIMN AEROBIC: CPT

## 2023-11-17 PROCEDURE — 6370000000 HC RX 637 (ALT 250 FOR IP): Performed by: STUDENT IN AN ORGANIZED HEALTH CARE EDUCATION/TRAINING PROGRAM

## 2023-11-17 PROCEDURE — 96366 THER/PROPH/DIAG IV INF ADDON: CPT

## 2023-11-17 PROCEDURE — 87205 SMEAR GRAM STAIN: CPT

## 2023-11-17 PROCEDURE — 11045 DBRDMT SUBQ TISS EACH ADDL: CPT

## 2023-11-17 PROCEDURE — 80053 COMPREHEN METABOLIC PANEL: CPT

## 2023-11-17 PROCEDURE — 85025 COMPLETE CBC W/AUTO DIFF WBC: CPT

## 2023-11-17 PROCEDURE — 0JB70ZZ EXCISION OF BACK SUBCUTANEOUS TISSUE AND FASCIA, OPEN APPROACH: ICD-10-PCS | Performed by: INTERNAL MEDICINE

## 2023-11-17 PROCEDURE — 6360000004 HC RX CONTRAST MEDICATION: Performed by: RADIOLOGY

## 2023-11-17 PROCEDURE — 6360000002 HC RX W HCPCS: Performed by: EMERGENCY MEDICINE

## 2023-11-17 PROCEDURE — 96365 THER/PROPH/DIAG IV INF INIT: CPT

## 2023-11-17 PROCEDURE — 83605 ASSAY OF LACTIC ACID: CPT

## 2023-11-17 PROCEDURE — 99285 EMERGENCY DEPT VISIT HI MDM: CPT

## 2023-11-17 PROCEDURE — 86403 PARTICLE AGGLUT ANTBDY SCRN: CPT

## 2023-11-17 PROCEDURE — 96367 TX/PROPH/DG ADDL SEQ IV INF: CPT

## 2023-11-17 PROCEDURE — 87040 BLOOD CULTURE FOR BACTERIA: CPT

## 2023-11-17 PROCEDURE — 74177 CT ABD & PELVIS W/CONTRAST: CPT

## 2023-11-17 PROCEDURE — 96375 TX/PRO/DX INJ NEW DRUG ADDON: CPT

## 2023-11-17 PROCEDURE — 85652 RBC SED RATE AUTOMATED: CPT

## 2023-11-17 PROCEDURE — 11042 DBRDMT SUBQ TIS 1ST 20SQCM/<: CPT

## 2023-11-17 RX ORDER — LIDOCAINE 50 MG/G
OINTMENT TOPICAL ONCE
OUTPATIENT
Start: 2023-11-17 | End: 2023-11-17

## 2023-11-17 RX ORDER — CLOBETASOL PROPIONATE 0.5 MG/G
OINTMENT TOPICAL ONCE
OUTPATIENT
Start: 2023-11-17 | End: 2023-11-17

## 2023-11-17 RX ORDER — BACITRACIN ZINC AND POLYMYXIN B SULFATE 500; 1000 [USP'U]/G; [USP'U]/G
OINTMENT TOPICAL ONCE
OUTPATIENT
Start: 2023-11-17 | End: 2023-11-17

## 2023-11-17 RX ORDER — BETAMETHASONE DIPROPIONATE 0.5 MG/G
CREAM TOPICAL ONCE
OUTPATIENT
Start: 2023-11-17 | End: 2023-11-17

## 2023-11-17 RX ORDER — LIDOCAINE 40 MG/G
CREAM TOPICAL ONCE
OUTPATIENT
Start: 2023-11-17 | End: 2023-11-17

## 2023-11-17 RX ORDER — LACTOBACILLUS RHAMNOSUS GG 10B CELL
1 CAPSULE ORAL ONCE
Status: COMPLETED | OUTPATIENT
Start: 2023-11-17 | End: 2023-11-17

## 2023-11-17 RX ORDER — LIDOCAINE HYDROCHLORIDE 20 MG/ML
JELLY TOPICAL ONCE
OUTPATIENT
Start: 2023-11-17 | End: 2023-11-17

## 2023-11-17 RX ORDER — GENTAMICIN SULFATE 1 MG/G
OINTMENT TOPICAL ONCE
OUTPATIENT
Start: 2023-11-17 | End: 2023-11-17

## 2023-11-17 RX ORDER — LACTOBACILLUS RHAMNOSUS GG 10B CELL
1 CAPSULE ORAL ONCE
Status: DISCONTINUED | OUTPATIENT
Start: 2023-11-17 | End: 2023-11-17 | Stop reason: CLARIF

## 2023-11-17 RX ORDER — GINSENG 100 MG
CAPSULE ORAL ONCE
OUTPATIENT
Start: 2023-11-17 | End: 2023-11-17

## 2023-11-17 RX ORDER — IBUPROFEN 200 MG
TABLET ORAL ONCE
OUTPATIENT
Start: 2023-11-17 | End: 2023-11-17

## 2023-11-17 RX ORDER — LIDOCAINE HYDROCHLORIDE 40 MG/ML
SOLUTION TOPICAL ONCE
OUTPATIENT
Start: 2023-11-17 | End: 2023-11-17

## 2023-11-17 RX ORDER — SODIUM CHLOR/HYPOCHLOROUS ACID 0.033 %
SOLUTION, IRRIGATION IRRIGATION ONCE
OUTPATIENT
Start: 2023-11-17 | End: 2023-11-17

## 2023-11-17 RX ORDER — TRIAMCINOLONE ACETONIDE 1 MG/G
OINTMENT TOPICAL ONCE
OUTPATIENT
Start: 2023-11-17 | End: 2023-11-17

## 2023-11-17 RX ADMIN — Medication 1 CAPSULE: at 21:54

## 2023-11-17 RX ADMIN — VANCOMYCIN HYDROCHLORIDE 2000 MG: 10 INJECTION, POWDER, LYOPHILIZED, FOR SOLUTION INTRAVENOUS at 21:36

## 2023-11-17 RX ADMIN — PIPERACILLIN AND TAZOBACTAM 4500 MG: 4; .5 INJECTION, POWDER, LYOPHILIZED, FOR SOLUTION INTRAVENOUS at 21:02

## 2023-11-17 RX ADMIN — IOPAMIDOL 75 ML: 755 INJECTION, SOLUTION INTRAVENOUS at 19:01

## 2023-11-17 ASSESSMENT — PAIN - FUNCTIONAL ASSESSMENT
PAIN_FUNCTIONAL_ASSESSMENT: NONE - DENIES PAIN

## 2023-11-17 NOTE — PROGRESS NOTES
Drainage Amount None (dry) 11/17/23 1333   Odor None 11/17/23 1333   Ceci-wound Assessment Dry/flaky 11/17/23 1333   Number of days: 0          Procedure Note  Indications:  Based on my examination of this patient's wound(s)/ulcer(s) today, debridement is required to promote healing and evaluate the wound base. Performed by: LENNY Tai CNP    Consent obtained:  Yes    Time out taken:  Yes    Pain Control: Anesthetic  Anesthetic: 4% Lidocaine Liquid Topical     Debridement:Excisional Debridement    Using curette the wound(s)/ulcer(s) was/were sharply debrided down through and including the removal of epidermis, dermis, and subcutaneous tissue. Devitalized Tissue Debrided:  fibrin, biofilm, slough, and necrotic/eschar      Wound/Ulcer #: 1, 2, 3, 5, 6, and 7      Percent of Wound/Ulcer Debrided: 100%    Total Surface Area Debrided:  48.09 sq cm     Estimated Blood Loss:  Minimal    Hemostasis Achieved:  by pressure    Procedural Pain:  4  / 10     Post Procedural Pain:  3 / 10     Response to treatment:  Well tolerated by patient. A culture was done. Plan:     Pt is not a smoker   In my professional opinion and based off the information that is available at this time this patient has appropriate indication for HBO Therapy: No    Treatment Note please see attached Discharge Instructions    Written patient dismissal instructions given to patient and signed by patient or POA. Discharge Instructions         Visit Discharge/Physician Orders     Discharge condition: Stable     Assessment of pain at discharge: MILD     Anesthetic used: 4% LIDOCAINE     Discharge to: Home     Left via:Private automobile     Accompanied by: accompanied by mother     F/A:  [facility]      Dressing Orders: To left foot wounds, and back wound-Cleanse with Vashe or Dakins, apply PLAIN alginate, cover and secure with dry dressings. Change daily.      Right ischium wound and left ischium wound- Cleanse

## 2023-11-18 PROBLEM — I10 PRIMARY HYPERTENSION: Status: ACTIVE | Noted: 2023-11-18

## 2023-11-18 PROBLEM — Q05.9 SPINA BIFIDA (HCC): Status: ACTIVE | Noted: 2023-11-18

## 2023-11-18 PROBLEM — T14.8XXA WOUND INFECTION: Status: ACTIVE | Noted: 2023-11-18

## 2023-11-18 PROBLEM — L08.9 WOUND INFECTION: Status: ACTIVE | Noted: 2023-11-18

## 2023-11-18 PROBLEM — L02.91 ABSCESS: Status: ACTIVE | Noted: 2023-11-18

## 2023-11-18 PROBLEM — D64.9 ANEMIA: Status: ACTIVE | Noted: 2023-11-18

## 2023-11-18 LAB
ANION GAP SERPL CALCULATED.3IONS-SCNC: 6 MMOL/L (ref 7–16)
BASOPHILS # BLD: 0.01 K/UL (ref 0–0.2)
BASOPHILS NFR BLD: 0 % (ref 0–2)
BUN SERPL-MCNC: 13 MG/DL (ref 6–20)
CALCIUM SERPL-MCNC: 9 MG/DL (ref 8.6–10.2)
CHLORIDE SERPL-SCNC: 100 MMOL/L (ref 98–107)
CO2 SERPL-SCNC: 34 MMOL/L (ref 22–29)
CREAT SERPL-MCNC: 0.5 MG/DL (ref 0.7–1.2)
EOSINOPHIL # BLD: 0.28 K/UL (ref 0.05–0.5)
EOSINOPHILS RELATIVE PERCENT: 4 % (ref 0–6)
ERYTHROCYTE [DISTWIDTH] IN BLOOD BY AUTOMATED COUNT: 15.5 % (ref 11.5–15)
ERYTHROCYTE [SEDIMENTATION RATE] IN BLOOD BY WESTERGREN METHOD: 78 MM/HR (ref 0–15)
GFR SERPL CREATININE-BSD FRML MDRD: >60 ML/MIN/1.73M2
GLUCOSE SERPL-MCNC: 97 MG/DL (ref 74–99)
HCT VFR BLD AUTO: 37 % (ref 37–54)
HGB BLD-MCNC: 11.8 G/DL (ref 12.5–16.5)
IMM GRANULOCYTES # BLD AUTO: 0.03 K/UL (ref 0–0.58)
IMM GRANULOCYTES NFR BLD: 0 % (ref 0–5)
LYMPHOCYTES NFR BLD: 1.04 K/UL (ref 1.5–4)
LYMPHOCYTES RELATIVE PERCENT: 14 % (ref 20–42)
MCH RBC QN AUTO: 30.6 PG (ref 26–35)
MCHC RBC AUTO-ENTMCNC: 31.9 G/DL (ref 32–34.5)
MCV RBC AUTO: 95.9 FL (ref 80–99.9)
MONOCYTES NFR BLD: 0.72 K/UL (ref 0.1–0.95)
MONOCYTES NFR BLD: 10 % (ref 2–12)
NEUTROPHILS NFR BLD: 72 % (ref 43–80)
NEUTS SEG NFR BLD: 5.24 K/UL (ref 1.8–7.3)
PLATELET # BLD AUTO: 220 K/UL (ref 130–450)
PMV BLD AUTO: 9.8 FL (ref 7–12)
POTASSIUM SERPL-SCNC: 4 MMOL/L (ref 3.5–5)
RBC # BLD AUTO: 3.86 M/UL (ref 3.8–5.8)
SODIUM SERPL-SCNC: 140 MMOL/L (ref 132–146)
WBC OTHER # BLD: 7.3 K/UL (ref 4.5–11.5)

## 2023-11-18 PROCEDURE — 80048 BASIC METABOLIC PNL TOTAL CA: CPT

## 2023-11-18 PROCEDURE — 85025 COMPLETE CBC W/AUTO DIFF WBC: CPT

## 2023-11-18 PROCEDURE — 6370000000 HC RX 637 (ALT 250 FOR IP): Performed by: STUDENT IN AN ORGANIZED HEALTH CARE EDUCATION/TRAINING PROGRAM

## 2023-11-18 PROCEDURE — 6360000002 HC RX W HCPCS: Performed by: STUDENT IN AN ORGANIZED HEALTH CARE EDUCATION/TRAINING PROGRAM

## 2023-11-18 PROCEDURE — 2580000003 HC RX 258

## 2023-11-18 PROCEDURE — 6360000002 HC RX W HCPCS

## 2023-11-18 PROCEDURE — 2060000000 HC ICU INTERMEDIATE R&B

## 2023-11-18 PROCEDURE — 2580000003 HC RX 258: Performed by: STUDENT IN AN ORGANIZED HEALTH CARE EDUCATION/TRAINING PROGRAM

## 2023-11-18 PROCEDURE — 2580000003 HC RX 258: Performed by: INTERNAL MEDICINE

## 2023-11-18 PROCEDURE — 6370000000 HC RX 637 (ALT 250 FOR IP): Performed by: INTERNAL MEDICINE

## 2023-11-18 PROCEDURE — 6360000002 HC RX W HCPCS: Performed by: INTERNAL MEDICINE

## 2023-11-18 PROCEDURE — 2700000000 HC OXYGEN THERAPY PER DAY

## 2023-11-18 PROCEDURE — 99223 1ST HOSP IP/OBS HIGH 75: CPT | Performed by: INTERNAL MEDICINE

## 2023-11-18 RX ORDER — POLYETHYLENE GLYCOL 3350 17 G/17G
17 POWDER, FOR SOLUTION ORAL DAILY PRN
Status: DISCONTINUED | OUTPATIENT
Start: 2023-11-18 | End: 2023-11-19 | Stop reason: HOSPADM

## 2023-11-18 RX ORDER — ACETAMINOPHEN 325 MG/1
650 TABLET ORAL EVERY 6 HOURS PRN
Status: DISCONTINUED | OUTPATIENT
Start: 2023-11-18 | End: 2023-11-19 | Stop reason: HOSPADM

## 2023-11-18 RX ORDER — ONDANSETRON 4 MG/1
4 TABLET, ORALLY DISINTEGRATING ORAL EVERY 8 HOURS PRN
Status: DISCONTINUED | OUTPATIENT
Start: 2023-11-18 | End: 2023-11-19 | Stop reason: HOSPADM

## 2023-11-18 RX ORDER — HYDROCHLOROTHIAZIDE 12.5 MG/1
12.5 TABLET ORAL DAILY
Status: DISCONTINUED | OUTPATIENT
Start: 2023-11-18 | End: 2023-11-19 | Stop reason: HOSPADM

## 2023-11-18 RX ORDER — SODIUM CHLORIDE 9 MG/ML
INJECTION, SOLUTION INTRAVENOUS PRN
Status: DISCONTINUED | OUTPATIENT
Start: 2023-11-18 | End: 2023-11-19 | Stop reason: HOSPADM

## 2023-11-18 RX ORDER — POTASSIUM CHLORIDE 20 MEQ/1
40 TABLET, EXTENDED RELEASE ORAL PRN
Status: DISCONTINUED | OUTPATIENT
Start: 2023-11-18 | End: 2023-11-19 | Stop reason: HOSPADM

## 2023-11-18 RX ORDER — ACETAMINOPHEN 500 MG
1000 TABLET ORAL ONCE
Status: COMPLETED | OUTPATIENT
Start: 2023-11-18 | End: 2023-11-18

## 2023-11-18 RX ORDER — SODIUM CHLORIDE 9 MG/ML
INJECTION, SOLUTION INTRAVENOUS CONTINUOUS
Status: DISCONTINUED | OUTPATIENT
Start: 2023-11-18 | End: 2023-11-19 | Stop reason: HOSPADM

## 2023-11-18 RX ORDER — ONDANSETRON 2 MG/ML
4 INJECTION INTRAMUSCULAR; INTRAVENOUS ONCE
Status: COMPLETED | OUTPATIENT
Start: 2023-11-18 | End: 2023-11-18

## 2023-11-18 RX ORDER — POTASSIUM CHLORIDE 7.45 MG/ML
10 INJECTION INTRAVENOUS PRN
Status: DISCONTINUED | OUTPATIENT
Start: 2023-11-18 | End: 2023-11-19 | Stop reason: HOSPADM

## 2023-11-18 RX ORDER — ENOXAPARIN SODIUM 100 MG/ML
30 INJECTION SUBCUTANEOUS 2 TIMES DAILY
Status: DISCONTINUED | OUTPATIENT
Start: 2023-11-18 | End: 2023-11-19 | Stop reason: HOSPADM

## 2023-11-18 RX ORDER — SODIUM CHLORIDE 0.9 % (FLUSH) 0.9 %
5-40 SYRINGE (ML) INJECTION PRN
Status: DISCONTINUED | OUTPATIENT
Start: 2023-11-18 | End: 2023-11-19 | Stop reason: HOSPADM

## 2023-11-18 RX ORDER — ACETAMINOPHEN 650 MG/1
650 SUPPOSITORY RECTAL EVERY 6 HOURS PRN
Status: DISCONTINUED | OUTPATIENT
Start: 2023-11-18 | End: 2023-11-19 | Stop reason: HOSPADM

## 2023-11-18 RX ORDER — SODIUM CHLORIDE 0.9 % (FLUSH) 0.9 %
5-40 SYRINGE (ML) INJECTION EVERY 12 HOURS SCHEDULED
Status: DISCONTINUED | OUTPATIENT
Start: 2023-11-18 | End: 2023-11-19 | Stop reason: HOSPADM

## 2023-11-18 RX ORDER — ONDANSETRON 2 MG/ML
4 INJECTION INTRAMUSCULAR; INTRAVENOUS EVERY 6 HOURS PRN
Status: DISCONTINUED | OUTPATIENT
Start: 2023-11-18 | End: 2023-11-19 | Stop reason: HOSPADM

## 2023-11-18 RX ORDER — ATENOLOL 50 MG/1
50 TABLET ORAL DAILY
Status: DISCONTINUED | OUTPATIENT
Start: 2023-11-18 | End: 2023-11-19 | Stop reason: HOSPADM

## 2023-11-18 RX ORDER — ALBUTEROL SULFATE 2.5 MG/3ML
2.5 SOLUTION RESPIRATORY (INHALATION) EVERY 6 HOURS PRN
Status: DISCONTINUED | OUTPATIENT
Start: 2023-11-18 | End: 2023-11-19 | Stop reason: HOSPADM

## 2023-11-18 RX ORDER — MAGNESIUM SULFATE IN WATER 40 MG/ML
2000 INJECTION, SOLUTION INTRAVENOUS PRN
Status: DISCONTINUED | OUTPATIENT
Start: 2023-11-18 | End: 2023-11-19 | Stop reason: HOSPADM

## 2023-11-18 RX ADMIN — ONDANSETRON 4 MG: 2 INJECTION INTRAMUSCULAR; INTRAVENOUS at 01:42

## 2023-11-18 RX ADMIN — SODIUM CHLORIDE: 9 INJECTION, SOLUTION INTRAVENOUS at 10:31

## 2023-11-18 RX ADMIN — ACETAMINOPHEN 1000 MG: 500 TABLET ORAL at 08:01

## 2023-11-18 RX ADMIN — ENOXAPARIN SODIUM 30 MG: 100 INJECTION SUBCUTANEOUS at 10:31

## 2023-11-18 RX ADMIN — PIPERACILLIN AND TAZOBACTAM 3375 MG: 3; .375 INJECTION, POWDER, LYOPHILIZED, FOR SOLUTION INTRAVENOUS at 21:34

## 2023-11-18 RX ADMIN — VANCOMYCIN HYDROCHLORIDE 1500 MG: 10 INJECTION, POWDER, LYOPHILIZED, FOR SOLUTION INTRAVENOUS at 11:37

## 2023-11-18 RX ADMIN — ENOXAPARIN SODIUM 30 MG: 100 INJECTION SUBCUTANEOUS at 20:00

## 2023-11-18 RX ADMIN — SODIUM CHLORIDE, PRESERVATIVE FREE 10 ML: 5 INJECTION INTRAVENOUS at 10:31

## 2023-11-18 RX ADMIN — PIPERACILLIN AND TAZOBACTAM 3375 MG: 3; .375 INJECTION, POWDER, LYOPHILIZED, FOR SOLUTION INTRAVENOUS at 05:52

## 2023-11-18 RX ADMIN — ATENOLOL 50 MG: 50 TABLET ORAL at 10:31

## 2023-11-18 RX ADMIN — HYDROCHLOROTHIAZIDE 12.5 MG: 12.5 TABLET ORAL at 10:31

## 2023-11-18 RX ADMIN — PIPERACILLIN AND TAZOBACTAM 3375 MG: 3; .375 INJECTION, POWDER, LYOPHILIZED, FOR SOLUTION INTRAVENOUS at 15:09

## 2023-11-18 ASSESSMENT — PAIN - FUNCTIONAL ASSESSMENT
PAIN_FUNCTIONAL_ASSESSMENT: NONE - DENIES PAIN
PAIN_FUNCTIONAL_ASSESSMENT: NONE - DENIES PAIN

## 2023-11-18 NOTE — H&P
[facility] History and Physical    CHIEF COMPLAINT:  sent from wound care for buttocks wound evaluation     History of Present Illness: This is a 51year old male with past medical history of spina bifida, paraplegia, chronic denny, sleep apnea, and multiple decubitus ulcers. He presented to wound care clinic 11/17 and was advised to present to ER. Patient was given Vancomycin and Zosyn in ER. Labs unremarkable except sed rate which was elevated. CT abdomen pelvis showing loculated fluid collection within the deep subcutaneous fat which appears to extend to the spina bifida defect - meningocele vs abscess. Neurosurgery was consulted in ER and recommend transfer to [facility]. Transfer initiated to [facility] consult with neurosurgery evaluation, possible plastic surgery. Decision to admit awaiting transfer to [facility]. Informant(s) for H&P: patient     REVIEW OF SYSTEMS:  A comprehensive review of systems was negative except for: what is in the HPI    PMH:  Past Medical History:   Diagnosis Date    Decreased dorsalis pedis pulse 7/7/2023    Decubitus ulcer due to spina bifida ([address]) 7/7/2023    Decubitus ulcer of back, stage 3 ([address]) 7/7/2023    Decubitus ulcer of ischial area, right, stage III ([address]) 7/7/2023    Pressure injury of lower back, stage 3 ([address]) 7/7/2023    Ulcer of foot, left, with fat layer exposed ([address]) 7/7/2023    Ulcer of foot, right, with fat layer exposed ([address]) 7/7/2023       Surgical History:  No past surgical history on file. Medications Prior to Admission:    Prior to Admission medications    Medication Sig Start Date End Date Taking?  Authorizing Provider   sodium hypochlorite (DAKINS) 0.125 % SOLN external solution Apply topically daily 8/25/23   LENNY Moreira - ADELA   hydroCHLOROthiazide (MICROZIDE) 12.5 MG capsule Take 1 capsule by mouth daily Pt unsure of dosage    Provider, MD Leisa   Multiple Vitamins-Minerals

## 2023-11-18 NOTE — CONSULTS
Comprehensive Nutrition Assessment    Type and Reason for Visit:  Initial, Consult, Wound    Nutrition Recommendations/Plan:   Continue current diet   Will add Jamel BID & Ensure HP BID     Malnutrition Assessment:  Malnutrition Status: At risk for malnutrition (Comment) (wounds) (11/18/23 1537)    Context:  Chronic Illness     Findings of the 6 clinical characteristics of malnutrition:  Energy Intake:  Mild decrease in energy intake (Comment)  Weight Loss:  Unable to assess (d/t lack of actual wt hx per EMR)     Body Fat Loss:  No significant body fat loss     Muscle Mass Loss:  Unable to assess (expected losses w/ paraplegia)    Fluid Accumulation:  Unable to assess (d/t multifactorial)     Strength:  Not Performed    Nutrition Assessment:    Pt w/ multiple wounds, CT abdomen pelvis showing loculated fluid collection within the deep subcutaneous fat which appears to extend to the spina bifida defect - meningocele vs abscess. Pt awaiting bed at [facility]. Hx spina bifida w/ paraplegia, lymphedema. Will order ONS to promote wound healing & monitor while inpatient. Nutrition Related Findings:    A&O X4, very pleasant, I&Os WDL, generalized +2 edema, abd WDL, +BS   Wound Type: Multiple, Pressure Injury, Wound Consult Pending       Current Nutrition Intake & Therapies:    Average Meal Intake: 26-50%  Average Supplements Intake: None Ordered  ADULT DIET; Regular    Anthropometric Measures:  Height: 160 cm (5' 3\")  Ideal Body Weight (IBW): 124 lbs (56 kg)    Admission Body Weight: 102.8 kg (226 lb 11.2 oz) (11/18 bed per RD measurement)  Current Body Weight: 102.8 kg (226 lb 11.2 oz) (11/18), 182.8 % IBW.  Weight Source: Bed Scale  Current BMI (kg/m2): 40.2  Usual Body Weight:  (no actual wt hx per EMR)     Weight Adjustment For: Paraplegia  Total Adjusted Percentage (Calculated): 7.5  Adjusted Ideal Body Weight (lbs) (Calculated): 114.7 lbs  Adjusted Ideal Body Weight (kg) (Calculated): 52.14 kg  Adjusted % Ideal Body

## 2023-11-18 NOTE — ED NOTES
7:55 AM EST  I received this patient at sign out from Dr. Jose Raul Ayala. I have discussed the patient's initial exam, treatment and plan of care with the out going physician. I have introduced my self to the patient / family and have answered their questions to this point. I have examined the patient myself and reviewed ordered tests / medications and  reviewed any available results to this point. If a resident is involved in the Emergency Department care, I have discussed my findings and plan with them as well. Patient signed out pending transfer to [facility]. Patient has history of spina bifida and sacral wounds that appear to be infected extending up towards the spinal canal.  Present at the patient is able to move his legs, but per chart review and after assessing the patient the patient actually is a paraplegic. Patient has been receiving Zosyn for pain control. Patient accepted for transfer to [facility] and is pending bed at this time. The night physician did speak with our neurosurgeon [facility] and neurosurgeon at [facility] who did accept for transfer and he is pending a bed currently. Patient was resting comfortably and in no acute distress on my assessment. He was treated with oral Tylenol. Did discuss with our hospitalist here, Dr. Jeramy Goss, who accepted for admission pending bed placement at [facility].       Dat Martínez,   11/18/23 7320
@2363 notified the access center to transfer the patient to [address] [place] [address] Michele Parra  11/17/23 2035
Breakfast tray ordered. Will follow up.       Deep Fuentes RN  11/18/23 9730
Department of Emergency Medicine  FIRST PROVIDER TRIAGE NOTE             Independent MLP           11/17/23  3:17 PM EST    Date of Encounter: 11/17/23   MRN: [id number]      HPI: Milo Hickey is a 51 y.o. male who presents to the ED for Wound Check (Sent by wound care. Wound on buttocks)   PATIENT WAS SENT TO ER FROM WOUND CARE FOR FURTHER EVALUATION OF A BUTTOCKS PRESSURE SORE. DENIES FEVERS. PATIENT HAS NO COMPLAINTS. ROS: Negative for cp or sob. PE: Gen Appearance/Constitutional: alert  HEENT: NC/NT. PERRLA,  Airway patent. Initial Plan of Care: All treatment areas with department are currently occupied. Plan to order/Initiate the following while awaiting opening in ED: labs.   Initiate Treatment-Testing, Proceed toTreatment Area When Bed Available for ED Attending/MICHAELP to Continue Care    Electronically signed by LENNY Villegas CNP   DD: 11/17/23      LENNY Villegas CNP  11/17/23 2101
Home meds reconciled with pt.      Kary Mccall RN  11/18/23 1839
Nurse to nurse called to 4S. Accepting SAMIRA Rivera updated on pt plan and condition. Pt to be transported to accepting unit. Will continue to closely monitor pt.        Shayy Ashby RN  11/18/23 4660
Okay to give boxed lunch per Dr. Ellyn Sahu. Patient provided with boxed lunch.      Sonia Finn RN  11/17/23 0685
Patient SPO2 fluctuating between 88-90% on RA. Placed patient on 2L nasal cannula. Patient states he wears oxygen at home as needed. States has hx of sleep apnea and was sleeping. Patient SPO2 95% on 2L NC.       Óscar Piña RN  11/18/23 0278
Patient requesting something to eat, will ask Dr. Jody Chavarria.        Bharti Starks, RN  11/17/23 9395
Patient sleeping in bed. Respirations equal and unlabored. No signs of distress.       Víctor Durham RN  11/18/23 7975
Report given to Giovanna Soriano RN.       Neno Lundberg RN  11/18/23 0862
oral

## 2023-11-19 VITALS
OXYGEN SATURATION: 94 % | SYSTOLIC BLOOD PRESSURE: 131 MMHG | BODY MASS INDEX: 39.87 KG/M2 | RESPIRATION RATE: 18 BRPM | DIASTOLIC BLOOD PRESSURE: 74 MMHG | HEIGHT: 63 IN | TEMPERATURE: 98 F | HEART RATE: 76 BPM | WEIGHT: 225 LBS

## 2023-11-19 LAB
ALBUMIN SERPL-MCNC: 3.3 G/DL (ref 3.5–5.2)
ALP SERPL-CCNC: 87 U/L (ref 40–129)
ALT SERPL-CCNC: 9 U/L (ref 0–40)
ANION GAP SERPL CALCULATED.3IONS-SCNC: 8 MMOL/L (ref 7–16)
AST SERPL-CCNC: 10 U/L (ref 0–39)
BASOPHILS # BLD: 0.02 K/UL (ref 0–0.2)
BASOPHILS NFR BLD: 0 % (ref 0–2)
BILIRUB SERPL-MCNC: 0.4 MG/DL (ref 0–1.2)
BUN SERPL-MCNC: 12 MG/DL (ref 6–20)
CALCIUM SERPL-MCNC: 9.1 MG/DL (ref 8.6–10.2)
CHLORIDE SERPL-SCNC: 98 MMOL/L (ref 98–107)
CO2 SERPL-SCNC: 34 MMOL/L (ref 22–29)
CREAT SERPL-MCNC: 0.6 MG/DL (ref 0.7–1.2)
DATE LAST DOSE: ABNORMAL
EOSINOPHIL # BLD: 0.25 K/UL (ref 0.05–0.5)
EOSINOPHILS RELATIVE PERCENT: 4 % (ref 0–6)
ERYTHROCYTE [DISTWIDTH] IN BLOOD BY AUTOMATED COUNT: 16 % (ref 11.5–15)
GFR SERPL CREATININE-BSD FRML MDRD: >60 ML/MIN/1.73M2
GLUCOSE SERPL-MCNC: 82 MG/DL (ref 74–99)
HCT VFR BLD AUTO: 34 % (ref 37–54)
HGB BLD-MCNC: 10.6 G/DL (ref 12.5–16.5)
IMM GRANULOCYTES # BLD AUTO: <0.03 K/UL (ref 0–0.58)
IMM GRANULOCYTES NFR BLD: 0 % (ref 0–5)
LYMPHOCYTES NFR BLD: 1.58 K/UL (ref 1.5–4)
LYMPHOCYTES RELATIVE PERCENT: 25 % (ref 20–42)
MAGNESIUM SERPL-MCNC: 1.9 MG/DL (ref 1.6–2.6)
MCH RBC QN AUTO: 30.2 PG (ref 26–35)
MCHC RBC AUTO-ENTMCNC: 31.2 G/DL (ref 32–34.5)
MCV RBC AUTO: 96.9 FL (ref 80–99.9)
MONOCYTES NFR BLD: 0.7 K/UL (ref 0.1–0.95)
MONOCYTES NFR BLD: 11 % (ref 2–12)
NEUTROPHILS NFR BLD: 59 % (ref 43–80)
NEUTS SEG NFR BLD: 3.72 K/UL (ref 1.8–7.3)
PLATELET # BLD AUTO: 189 K/UL (ref 130–450)
PMV BLD AUTO: 10.3 FL (ref 7–12)
POTASSIUM SERPL-SCNC: 3.1 MMOL/L (ref 3.5–5)
PROT SERPL-MCNC: 6.9 G/DL (ref 6.4–8.3)
RBC # BLD AUTO: 3.51 M/UL (ref 3.8–5.8)
SODIUM SERPL-SCNC: 140 MMOL/L (ref 132–146)
TME LAST DOSE: ABNORMAL H
VANCOMYCIN DOSE: ABNORMAL MG
VANCOMYCIN SERPL-MCNC: 42 UG/ML (ref 5–40)
WBC OTHER # BLD: 6.3 K/UL (ref 4.5–11.5)

## 2023-11-19 PROCEDURE — 2580000003 HC RX 258: Performed by: INTERNAL MEDICINE

## 2023-11-19 PROCEDURE — 36415 COLL VENOUS BLD VENIPUNCTURE: CPT

## 2023-11-19 PROCEDURE — 85025 COMPLETE CBC W/AUTO DIFF WBC: CPT

## 2023-11-19 PROCEDURE — 2580000003 HC RX 258

## 2023-11-19 PROCEDURE — 6360000002 HC RX W HCPCS

## 2023-11-19 PROCEDURE — 6360000002 HC RX W HCPCS: Performed by: INTERNAL MEDICINE

## 2023-11-19 PROCEDURE — 6370000000 HC RX 637 (ALT 250 FOR IP): Performed by: INTERNAL MEDICINE

## 2023-11-19 PROCEDURE — 2700000000 HC OXYGEN THERAPY PER DAY

## 2023-11-19 PROCEDURE — 83735 ASSAY OF MAGNESIUM: CPT

## 2023-11-19 PROCEDURE — 80202 ASSAY OF VANCOMYCIN: CPT

## 2023-11-19 PROCEDURE — 80053 COMPREHEN METABOLIC PANEL: CPT

## 2023-11-19 PROCEDURE — 2580000003 HC RX 258: Performed by: STUDENT IN AN ORGANIZED HEALTH CARE EDUCATION/TRAINING PROGRAM

## 2023-11-19 PROCEDURE — 6360000002 HC RX W HCPCS: Performed by: STUDENT IN AN ORGANIZED HEALTH CARE EDUCATION/TRAINING PROGRAM

## 2023-11-19 RX ADMIN — ONDANSETRON 4 MG: 2 INJECTION INTRAMUSCULAR; INTRAVENOUS at 09:45

## 2023-11-19 RX ADMIN — HYDROCHLOROTHIAZIDE 12.5 MG: 12.5 TABLET ORAL at 08:03

## 2023-11-19 RX ADMIN — SODIUM CHLORIDE, PRESERVATIVE FREE 10 ML: 5 INJECTION INTRAVENOUS at 08:03

## 2023-11-19 RX ADMIN — ENOXAPARIN SODIUM 30 MG: 100 INJECTION SUBCUTANEOUS at 08:03

## 2023-11-19 RX ADMIN — VANCOMYCIN HYDROCHLORIDE 1500 MG: 10 INJECTION, POWDER, LYOPHILIZED, FOR SOLUTION INTRAVENOUS at 01:40

## 2023-11-19 RX ADMIN — POTASSIUM CHLORIDE 40 MEQ: 1500 TABLET, EXTENDED RELEASE ORAL at 08:03

## 2023-11-19 RX ADMIN — PIPERACILLIN AND TAZOBACTAM 3375 MG: 3; .375 INJECTION, POWDER, LYOPHILIZED, FOR SOLUTION INTRAVENOUS at 05:46

## 2023-11-19 RX ADMIN — ATENOLOL 50 MG: 50 TABLET ORAL at 08:03

## 2023-11-19 NOTE — PROGRESS NOTES
[address] called with bed assignment. [facility]  G 90 Bed 35   Nurse to nurse [phone]     [address]  to arrange transport, will return call. Transport arranged via Physicians @ 0900 11/19.
4 Eyes Skin Assessment     NAME:  Cherry Church  YOB: 1975  MEDICAL RECORD NUMBER:  [id number]    The patient is being assessed for  Admission    I agree that at least one RN has performed a thorough Head to Toe Skin Assessment on the patient. ALL assessment sites listed below have been assessed. Areas assessed by both nurses:    Head, Face, Ears, Shoulders, Back, Chest, Arms, Elbows, Hands, Sacrum. Buttock, Coccyx, Ischium, Legs. Feet and Heels, and Under Medical Devices         Does the Patient have a Wound? Yes wound(s) were present on assessment. LDA wound assessment was Initiated and completed by SAMIRA Jackman Prevention initiated by RN: Yes  Wound Care Orders initiated by RN: Yes    Pressure Injury (Stage 3,4, Unstageable, DTI, NWPT, and Complex wounds) if present, place Wound referral order by RN under : Yes    New Ostomies, if present place, Ostomy referral order under : No     Nurse 1 eSignature: Electronically signed by Raji Cavazos RN on 11/18/23 at 3:42 PM EST    **SHARE this note so that the co-signing nurse can place an eSignature**    Nurse 2 eSignature: Electronically signed by Clint Jaime RN on 11/18/23 at 3:43 PM EST 4 Eyes Skin Assessment     NAME:  [address] [place] [address] [address]:  1975  MEDICAL RECORD NUMBER:  [id number]    The patient is being assessed for  Admission    I agree that at least one RN has performed a thorough Head to Toe Skin Assessment on the patient. ALL assessment sites listed below have been assessed. Areas assessed by both nurses:    Shoulders, Back, Chest, Sacrum. Buttock, Coccyx, Ischium, and Legs. Feet and Heels        Does the Patient have a Wound? Yes wound(s) were present on assessment.  LDA wound assessment was Initiated and completed by SAMIRA Jackman Prevention initiated by RN: Yes  Wound Care Orders initiated by RN: Yes    Pressure Injury (Stage 3,4, Unstageable, DTI, NWPT, and Complex wounds) if present,
@8372 access center notified pt admitted here to room 431 awaiting bed at [facility]
Nurse to Nurse called to [address] [facility].
Pharmacy Consultation Note  (Antibiotic Dosing and Monitoring)    Initial consult date: 11/18/23  Consulting physician/provider: Dr. Raquel Escalona  Drug: Vancomycin  Indication: abcess    Age/  Gender Height Weight IBW  Allergy Information   48 y.o./male 160 cm (5' 3\") 104.3 kg (230 lb)     Ideal body weight: 56.9 kg (125 lb 7.1 oz)  Adjusted ideal body weight: 75.9 kg (167 lb 4.2 oz)   Naproxen, Gentamicin, and Levaquin [levofloxacin]      Renal Function:  Recent Labs     11/17/23  1618 11/18/23  0553   BUN 17 13   CREATININE 0.5* 0.5*       Intake/Output Summary (Last 24 hours) at 11/18/2023 0919  Last data filed at 11/18/2023 0640  Gross per 24 hour   Intake --   Output 900 ml   Net -900 ml       Vancomycin Monitoring:  Trough:  No results for input(s): \"VANCOTROUGH\" in the last 72 hours. Random:  No results for input(s): \"VANCORANDOM\" in the last 72 hours. No results for input(s): \"BLOODCULT2\" in the last 72 hours. Historical Cultures:  Organism   Date Value Ref Range Status   07/07/2023 Staphylococcus aureus (A)  Final   07/07/2023 Pseudomonas aeruginosa (A)  Final   07/07/2023 Corynebacterium species (A)  Final     No results for input(s): \"BC\" in the last 72 hours. Vancomycin Administration Times:  Recent vancomycin administrations                     vancomycin (VANCOCIN) 2000 mg in 0.9% sodium chloride 500 mL IVPB (mg) 2,000 mg New Bag 11/17/23 2136                    Assessment:  Patient is a 51 y.o. male who has been initiated on vancomycin  Estimated Creatinine Clearance: 192 mL/min (A) (based on SCr of 0.5 mg/dL (L)). To dose vancomycin, pharmacy will be utilizing Trac[employer] calculation software for goal AUC/PARAMJIT 400-600 mg/L-hr (predicted AUC/PARAMJIT = 588, Tr =15.6)    Plan:   Will start vancomycin 1500 mg IV every 12 hours  Will check vancomycin level tomorrow AM  Will continue to monitor renal function   Pharmacy to follow    FAYE Shoemaker [facility], PharmD 11/18/2023 9:34
Wound care nurse perfect served and notified of new consult.
based upon vanco administration time (dose given @ 0140) and level drawn @ 0333, this is a \"peak\" level. Will adjust dose down to 1250 mg IV q12 hours for a predicted AUC/PARAMJIT of 590 and trough of 13.9.      Plan:  Start vancomycin 1250 mg IV every 12 hours at 1400 today  Will continue to monitor renal function   Pharmacy to follow    Maty Buenrostro [facility], PharmD 11/19/2023 7:08 AM  [phone]

## 2023-11-19 NOTE — DISCHARGE SUMMARY
[facility] Physician Discharge Summary       No follow-up provider specified. Activity level: as nabor    Diet: No diet orders on file    Dispo:[facility]    Condition at discharge: fair      Patient ID:  Oscar Lizama  [id number]  75 y.o.  1975    Admit date: 11/17/2023    Discharge date and time:  11/19/2023  2:51 PM    Admission Diagnoses: Principal Problem:    Wound infection  Active Problems:    Spina bifida ([address])    Abscess    Anemia    Primary hypertension  Resolved Problems:    * No resolved hospital problems. *      Discharge Diagnoses: Principal Problem:    Wound infection  Active Problems:    Spina bifida ([address])    Abscess    Anemia    Primary hypertension  Resolved Problems:    * No resolved hospital problems. *    Spina bifida with paraplegia with possible abscess  Htn  Obesity (bmi39.86)  anemia      Consults:  IP CONSULT TO NEUROSURGERY  PHARMACY TO DOSE VANCOMYCIN  IP CONSULT TO DIETITIAN    Procedures: none    Hospital Course: Patient was admitted with Wound infection [T14. 8XXA, L08.9]  Wound of sacral region, initial encounter [S31.000A]  Spina bifida ([address]) [Q05.9]. Patient is a 51year old male with past medical history of spina bifida, paraplegia, chronic denny, sleep apnea, and multiple decubitus ulcers. He presented to wound care clinic 11/17 and was advised to present to ER. Patient was given Vancomycin and Zosyn in ER. Labs unremarkable except sed rate which was elevated. CT abdomen pelvis showing loculated fluid collection within the deep subcutaneous fat which appears to extend to the spina bifida defect - meningocele vs abscess. Neurosurgery was consulted in ER and recommend transfer to [facility]. Transfer initiated to [facility] consult with neurosurgery evaluation, possible plastic surgery. Decision to admit awaiting transfer to [facility]. Pt seen and examined. Pt had been placed on abx. Pt transferred. to [facility]

## 2023-11-21 LAB
MICROORGANISM SPEC CULT: ABNORMAL
MICROORGANISM/AGENT SPEC: ABNORMAL
SPECIMEN DESCRIPTION: ABNORMAL
SPECIMEN DESCRIPTION: ABNORMAL

## 2023-11-23 LAB
MICROORGANISM SPEC CULT: NORMAL
MICROORGANISM SPEC CULT: NORMAL
SERVICE CMNT-IMP: NORMAL
SERVICE CMNT-IMP: NORMAL
SPECIMEN DESCRIPTION: NORMAL
SPECIMEN DESCRIPTION: NORMAL

## 2023-11-26 LAB
SEND OUT REPORT: NORMAL
TEST NAME: NORMAL

## 2023-12-06 NOTE — DISCHARGE INSTRUCTIONS
Visit Discharge/Physician Orders     Discharge condition: Stable     Assessment of pain at discharge: MILD     Anesthetic used: 4% LIDOCAINE     Discharge to: Home     Left via:Private automobile     Accompanied by: accompanied by mother     [facility]F/[facility]:  [facility]     Dressing Orders: To right and left foot wounds, and back wound-Cleanse with saline, apply PLAIN alginate, cover and secure with dry dressings. Change daily. Right ischium wound and left ischium wound- Cleanse with Vashe or Dakins, pack with dakins moistened guaze, cover and secure with dry dressing. Change daily. Home care to change once weekly. May pad wounds with foam for extra offloading      Begin to wear spandigrips bilaterally during the day, may remove at night. Treatment Orders: Follow a nutritious diet. Choose foods high in protein: chicken, fish, and eggs. Choose foods high in Vitamin C. Multivitamin daily unless contraindicated. Offload back as often as possible. Try to change position every 2 hours. Offload feet with eggcrate cushions. [facility] followup visit _______2 weeks___________________  (Please note your next appointment above and if you are unable to keep, kindly give a 24 hour notice. Thank you.)     Physician signature:__________________________        If you experience any of the following, please call the [facility] during business hours:     * Increase in Pain  * Temperature over 101  * Increase in drainage from your wound  * Drainage with a foul odor  * Bleeding  * Increase in swelling  * Need for compression bandage changes due to slippage, breakthrough drainage. If you need medical attention outside of the business hours of the [facility] please contact your PCP or go to the nearest emergency room.

## 2023-12-08 ENCOUNTER — HOSPITAL ENCOUNTER (OUTPATIENT)
Dept: WOUND CARE | Age: 48
Discharge: HOME | End: 2023-12-08
Payer: MEDICARE

## 2023-12-08 VITALS
HEART RATE: 100 BPM | DIASTOLIC BLOOD PRESSURE: 80 MMHG | RESPIRATION RATE: 20 BRPM | TEMPERATURE: 96.3 F | SYSTOLIC BLOOD PRESSURE: 142 MMHG

## 2023-12-08 DIAGNOSIS — R09.89 DECREASED DORSALIS PEDIS PULSE: ICD-10-CM

## 2023-12-08 DIAGNOSIS — L89.320: ICD-10-CM

## 2023-12-08 DIAGNOSIS — L89.313 DECUBITUS ULCER OF ISCHIAL AREA, RIGHT, STAGE III (HCC): ICD-10-CM

## 2023-12-08 DIAGNOSIS — Q05.9 DECUBITUS ULCER DUE TO SPINA BIFIDA (HCC): Primary | ICD-10-CM

## 2023-12-08 DIAGNOSIS — I89.0 LYMPHEDEMA OF BOTH LOWER EXTREMITIES: ICD-10-CM

## 2023-12-08 DIAGNOSIS — Z98.890 STATUS POST SURGERY: ICD-10-CM

## 2023-12-08 DIAGNOSIS — L97.522 ULCER OF FOOT, LEFT, WITH FAT LAYER EXPOSED (HCC): ICD-10-CM

## 2023-12-08 DIAGNOSIS — L89.90 DECUBITUS ULCER DUE TO SPINA BIFIDA (HCC): Primary | ICD-10-CM

## 2023-12-08 DIAGNOSIS — L97.512 ULCER OF FOOT, RIGHT, WITH FAT LAYER EXPOSED (HCC): ICD-10-CM

## 2023-12-08 DIAGNOSIS — L89.103 DECUBITUS ULCER OF BACK, STAGE 3 (HCC): ICD-10-CM

## 2023-12-08 PROCEDURE — 11042 DBRDMT SUBQ TIS 1ST 20SQCM/<: CPT

## 2023-12-08 RX ORDER — SODIUM CHLOR/HYPOCHLOROUS ACID 0.033 %
SOLUTION, IRRIGATION IRRIGATION ONCE
OUTPATIENT
Start: 2023-12-08 | End: 2023-12-08

## 2023-12-08 RX ORDER — LIDOCAINE HYDROCHLORIDE 20 MG/ML
JELLY TOPICAL ONCE
OUTPATIENT
Start: 2023-12-08 | End: 2023-12-08

## 2023-12-08 RX ORDER — LIDOCAINE HYDROCHLORIDE 40 MG/ML
SOLUTION TOPICAL ONCE
OUTPATIENT
Start: 2023-12-08 | End: 2023-12-08

## 2023-12-08 RX ORDER — BACITRACIN ZINC AND POLYMYXIN B SULFATE 500; 1000 [USP'U]/G; [USP'U]/G
OINTMENT TOPICAL ONCE
OUTPATIENT
Start: 2023-12-08 | End: 2023-12-08

## 2023-12-08 RX ORDER — IBUPROFEN 200 MG
TABLET ORAL ONCE
OUTPATIENT
Start: 2023-12-08 | End: 2023-12-08

## 2023-12-08 RX ORDER — LIDOCAINE 50 MG/G
OINTMENT TOPICAL ONCE
OUTPATIENT
Start: 2023-12-08 | End: 2023-12-08

## 2023-12-08 RX ORDER — TRIAMCINOLONE ACETONIDE 1 MG/G
OINTMENT TOPICAL ONCE
OUTPATIENT
Start: 2023-12-08 | End: 2023-12-08

## 2023-12-08 RX ORDER — BETAMETHASONE DIPROPIONATE 0.5 MG/G
CREAM TOPICAL ONCE
OUTPATIENT
Start: 2023-12-08 | End: 2023-12-08

## 2023-12-08 RX ORDER — CLOBETASOL PROPIONATE 0.5 MG/G
OINTMENT TOPICAL ONCE
OUTPATIENT
Start: 2023-12-08 | End: 2023-12-08

## 2023-12-08 RX ORDER — GENTAMICIN SULFATE 1 MG/G
OINTMENT TOPICAL ONCE
OUTPATIENT
Start: 2023-12-08 | End: 2023-12-08

## 2023-12-08 RX ORDER — LIDOCAINE 40 MG/G
CREAM TOPICAL ONCE
OUTPATIENT
Start: 2023-12-08 | End: 2023-12-08

## 2023-12-08 RX ORDER — GINSENG 100 MG
CAPSULE ORAL ONCE
OUTPATIENT
Start: 2023-12-08 | End: 2023-12-08

## 2023-12-08 NOTE — PROGRESS NOTES
Wound Healing Center /Hyperbarics   History and Physical/Consultation  Vascular    Referring Physician : Becky Pavon MD  [address] [place] [address] RECORD NUMBER:  [id number]  AGE: 51 y.o. GENDER: male  : 1975  EPISODE DATE:  2023  Subjective:     Chief Complaint   Patient presents with    Wound Check     Wound back, sacral , foot         HISTORY of PRESENT ILLNESS HPI     [place] is a 51 y.o. male who presents today for wound/ulcer evaluation. History of Wound Context:  The patient has had a wounds of both feet, right ischium, back pressure ulcers between the junction of the lumbar spine and thoracic spine which was first noted approximately at least 3 to 4 months, patient and his mother tell me that they noticed that when the patient got home from a extended care facility but they do not recall how long he had ulcers,. This has been treated at the [facility] wound care center and family came to [facility], and in the past he was treated here with good results. On their initial visit to the wound healing center, 23, the patient has noted that the wound has not been improving. The patient has had similar previous wounds in the past.        Patient has spina bifida with hydrocephalus, has undergone previous surgeries multiple including peritoneal venous shunt for hydrocephalus in the [place], has paraplegia, does not ambulate, recently underwent insertion of suprapubic catheter    Pt is currently not on abx.       Wound/Ulcer Pain Timing/Severity: constant  Quality of pain: dull, aching  Severity:  3 / 10   Modifying Factors: None  Associated Signs/Symptoms: drainage and pain    Ulcer Identification:  Ulcer Type: pressure and non-healing/non-surgical  Contributing Factors: lymphedema, chronic pressure, decreased mobility, and shear force    Diabetic/Pressure/Non Pressure Ulcers only:  Ulcer: N/A    If patient has diabetic lower extremity wounds  [place] Classification of

## 2023-12-22 PROBLEM — N50.89 SCROTAL ULCER: Status: RESOLVED | Noted: 2023-11-17 | Resolved: 2023-12-22

## 2024-01-03 NOTE — DISCHARGE INSTRUCTIONS
Visit Discharge/Physician Orders     Discharge condition: Stable     Assessment of pain at discharge: MILD     Anesthetic used: 4% LIDOCAINE     Discharge to: Home     Left via:Private automobile     Accompanied by: accompanied by mother     ECF/HHA:  [facility] *NEW ORDER     Dressing Orders: Left foot wound, and back wound-Cleanse with saline, apply PLAIN alginate, cover and secure with dry dressings. Change daily.     Right ischium wound and left ischium wound- Cleanse with Vashe or Dakins, pack with dakins moistened guaze, cover and secure with dry dressing. Change daily.      Home care to change once weekly.      May pad wounds with foam for extra offloading      Begin to wear spandigrips bilaterally during the day, may remove at night.      Treatment Orders: Follow a nutritious diet. Choose foods high in protein: chicken, fish, and eggs. Choose foods high in Vitamin C. Multivitamin daily unless contraindicated. Offload back as often as possible. Try to change position every 2 hours. Offload feet with eggcrate cushions.    Complete antibiotic        [facility] followup visit _______2 weeks___________________  (Please note your next appointment above and if you are unable to keep, kindly give a 24 hour notice. Thank you.)     Physician signature:__________________________        If you experience any of the following, please call the Wound Care Center during business hours:     * Increase in Pain  * Temperature over 101  * Increase in drainage from your wound  * Drainage with a foul odor  * Bleeding  * Increase in swelling  * Need for compression bandage changes due to slippage, breakthrough drainage.     If you need medical attention outside of the business hours of the Wound Care Centers please contact your PCP or go to the nearest emergency room.

## 2024-01-05 ENCOUNTER — HOSPITAL ENCOUNTER (OUTPATIENT)
Dept: WOUND CARE | Age: 49
Discharge: HOME | End: 2024-01-05
Payer: MEDICARE

## 2024-01-05 VITALS
TEMPERATURE: 97.5 F | HEART RATE: 80 BPM | RESPIRATION RATE: 16 BRPM | SYSTOLIC BLOOD PRESSURE: 134 MMHG | DIASTOLIC BLOOD PRESSURE: 78 MMHG

## 2024-01-05 DIAGNOSIS — R09.89 DECREASED DORSALIS PEDIS PULSE: ICD-10-CM

## 2024-01-05 DIAGNOSIS — L89.90 DECUBITUS ULCER DUE TO SPINA BIFIDA (HCC): Primary | ICD-10-CM

## 2024-01-05 DIAGNOSIS — I89.0 LYMPHEDEMA OF BOTH LOWER EXTREMITIES: ICD-10-CM

## 2024-01-05 DIAGNOSIS — L89.320: ICD-10-CM

## 2024-01-05 DIAGNOSIS — L97.522 ULCER OF FOOT, LEFT, WITH FAT LAYER EXPOSED (HCC): ICD-10-CM

## 2024-01-05 DIAGNOSIS — L97.512 ULCER OF FOOT, RIGHT, WITH FAT LAYER EXPOSED (HCC): ICD-10-CM

## 2024-01-05 DIAGNOSIS — Q05.9 DECUBITUS ULCER DUE TO SPINA BIFIDA (HCC): Primary | ICD-10-CM

## 2024-01-05 DIAGNOSIS — L89.313 DECUBITUS ULCER OF ISCHIAL AREA, RIGHT, STAGE III (HCC): ICD-10-CM

## 2024-01-05 DIAGNOSIS — L89.103 DECUBITUS ULCER OF BACK, STAGE 3 (HCC): ICD-10-CM

## 2024-01-05 DIAGNOSIS — Q05.4 SPINA BIFIDA WITH HYDROCEPHALUS, UNSPECIFIED SPINAL REGION (HCC): ICD-10-CM

## 2024-01-05 PROCEDURE — 11042 DBRDMT SUBQ TIS 1ST 20SQCM/<: CPT

## 2024-01-05 PROCEDURE — 11045 DBRDMT SUBQ TISS EACH ADDL: CPT

## 2024-01-05 RX ORDER — CLOBETASOL PROPIONATE 0.5 MG/G
OINTMENT TOPICAL ONCE
OUTPATIENT
Start: 2024-01-05 | End: 2024-01-05

## 2024-01-05 RX ORDER — TRIAMCINOLONE ACETONIDE 1 MG/G
OINTMENT TOPICAL ONCE
OUTPATIENT
Start: 2024-01-05 | End: 2024-01-05

## 2024-01-05 RX ORDER — LIDOCAINE HYDROCHLORIDE 20 MG/ML
JELLY TOPICAL ONCE
OUTPATIENT
Start: 2024-01-05 | End: 2024-01-05

## 2024-01-05 RX ORDER — GENTAMICIN SULFATE 1 MG/G
OINTMENT TOPICAL ONCE
OUTPATIENT
Start: 2024-01-05 | End: 2024-01-05

## 2024-01-05 RX ORDER — BACITRACIN ZINC AND POLYMYXIN B SULFATE 500; 1000 [USP'U]/G; [USP'U]/G
OINTMENT TOPICAL ONCE
OUTPATIENT
Start: 2024-01-05 | End: 2024-01-05

## 2024-01-05 RX ORDER — LIDOCAINE 40 MG/G
CREAM TOPICAL ONCE
OUTPATIENT
Start: 2024-01-05 | End: 2024-01-05

## 2024-01-05 RX ORDER — BETAMETHASONE DIPROPIONATE 0.5 MG/G
CREAM TOPICAL ONCE
OUTPATIENT
Start: 2024-01-05 | End: 2024-01-05

## 2024-01-05 RX ORDER — LIDOCAINE 50 MG/G
OINTMENT TOPICAL ONCE
OUTPATIENT
Start: 2024-01-05 | End: 2024-01-05

## 2024-01-05 RX ORDER — SULFAMETHOXAZOLE AND TRIMETHOPRIM 800; 160 MG/1; MG/1
1 TABLET ORAL 2 TIMES DAILY
COMMUNITY

## 2024-01-05 RX ORDER — LIDOCAINE HYDROCHLORIDE 40 MG/ML
SOLUTION TOPICAL ONCE
OUTPATIENT
Start: 2024-01-05 | End: 2024-01-05

## 2024-01-05 RX ORDER — LIDOCAINE HYDROCHLORIDE 40 MG/ML
SOLUTION TOPICAL ONCE
Status: COMPLETED | OUTPATIENT
Start: 2024-01-05 | End: 2024-01-05

## 2024-01-05 RX ORDER — GINSENG 100 MG
CAPSULE ORAL ONCE
OUTPATIENT
Start: 2024-01-05 | End: 2024-01-05

## 2024-01-05 RX ORDER — IBUPROFEN 200 MG
TABLET ORAL ONCE
OUTPATIENT
Start: 2024-01-05 | End: 2024-01-05

## 2024-01-05 RX ORDER — SODIUM CHLOR/HYPOCHLOROUS ACID 0.033 %
SOLUTION, IRRIGATION IRRIGATION ONCE
OUTPATIENT
Start: 2024-01-05 | End: 2024-01-05

## 2024-01-05 RX ADMIN — LIDOCAINE HYDROCHLORIDE 8 ML: 40 SOLUTION TOPICAL at 14:16

## 2024-01-19 ENCOUNTER — HOSPITAL ENCOUNTER (OUTPATIENT)
Dept: WOUND CARE | Age: 49
Discharge: HOME | End: 2024-01-19

## 2024-01-26 ENCOUNTER — HOSPITAL ENCOUNTER (OUTPATIENT)
Dept: WOUND CARE | Age: 49
Discharge: HOME | End: 2024-01-26
Payer: MEDICARE

## 2024-01-26 VITALS
RESPIRATION RATE: 16 BRPM | HEART RATE: 96 BPM | TEMPERATURE: 96.7 F | SYSTOLIC BLOOD PRESSURE: 128 MMHG | DIASTOLIC BLOOD PRESSURE: 80 MMHG

## 2024-01-26 DIAGNOSIS — L89.313 DECUBITUS ULCER OF ISCHIAL AREA, RIGHT, STAGE III (HCC): ICD-10-CM

## 2024-01-26 DIAGNOSIS — I89.0 LYMPHEDEMA OF BOTH LOWER EXTREMITIES: ICD-10-CM

## 2024-01-26 DIAGNOSIS — Q05.9 DECUBITUS ULCER DUE TO SPINA BIFIDA (HCC): Primary | ICD-10-CM

## 2024-01-26 DIAGNOSIS — L89.90 DECUBITUS ULCER DUE TO SPINA BIFIDA (HCC): Primary | ICD-10-CM

## 2024-01-26 DIAGNOSIS — L97.522 ULCER OF FOOT, LEFT, WITH FAT LAYER EXPOSED (HCC): ICD-10-CM

## 2024-01-26 DIAGNOSIS — L89.103 DECUBITUS ULCER OF BACK, STAGE 3 (HCC): ICD-10-CM

## 2024-01-26 DIAGNOSIS — R09.89 DECREASED DORSALIS PEDIS PULSE: ICD-10-CM

## 2024-01-26 DIAGNOSIS — L89.320: ICD-10-CM

## 2024-01-26 DIAGNOSIS — G47.33 OBSTRUCTIVE SLEEP APNEA SYNDROME: ICD-10-CM

## 2024-01-26 DIAGNOSIS — R53.81 MALAISE: ICD-10-CM

## 2024-01-26 PROCEDURE — 11042 DBRDMT SUBQ TIS 1ST 20SQCM/<: CPT

## 2024-01-26 PROCEDURE — 11045 DBRDMT SUBQ TISS EACH ADDL: CPT

## 2024-01-26 RX ORDER — LIDOCAINE HYDROCHLORIDE 40 MG/ML
SOLUTION TOPICAL ONCE
OUTPATIENT
Start: 2024-01-26 | End: 2024-01-26

## 2024-01-26 RX ORDER — LIDOCAINE 50 MG/G
OINTMENT TOPICAL ONCE
OUTPATIENT
Start: 2024-01-26 | End: 2024-01-26

## 2024-01-26 RX ORDER — SODIUM CHLOR/HYPOCHLOROUS ACID 0.033 %
SOLUTION, IRRIGATION IRRIGATION ONCE
OUTPATIENT
Start: 2024-01-26 | End: 2024-01-26

## 2024-01-26 RX ORDER — LIDOCAINE HYDROCHLORIDE 20 MG/ML
JELLY TOPICAL ONCE
OUTPATIENT
Start: 2024-01-26 | End: 2024-01-26

## 2024-01-26 RX ORDER — LIDOCAINE 40 MG/G
CREAM TOPICAL ONCE
OUTPATIENT
Start: 2024-01-26 | End: 2024-01-26

## 2024-01-26 RX ORDER — CLOBETASOL PROPIONATE 0.5 MG/G
OINTMENT TOPICAL ONCE
OUTPATIENT
Start: 2024-01-26 | End: 2024-01-26

## 2024-01-26 RX ORDER — BACITRACIN ZINC AND POLYMYXIN B SULFATE 500; 1000 [USP'U]/G; [USP'U]/G
OINTMENT TOPICAL ONCE
OUTPATIENT
Start: 2024-01-26 | End: 2024-01-26

## 2024-01-26 RX ORDER — TRIAMCINOLONE ACETONIDE 1 MG/G
OINTMENT TOPICAL ONCE
OUTPATIENT
Start: 2024-01-26 | End: 2024-01-26

## 2024-01-26 RX ORDER — GENTAMICIN SULFATE 1 MG/G
OINTMENT TOPICAL ONCE
OUTPATIENT
Start: 2024-01-26 | End: 2024-01-26

## 2024-01-26 RX ORDER — GINSENG 100 MG
CAPSULE ORAL ONCE
OUTPATIENT
Start: 2024-01-26 | End: 2024-01-26

## 2024-01-26 RX ORDER — BETAMETHASONE DIPROPIONATE 0.5 MG/G
CREAM TOPICAL ONCE
OUTPATIENT
Start: 2024-01-26 | End: 2024-01-26

## 2024-01-26 RX ORDER — LIDOCAINE HYDROCHLORIDE 40 MG/ML
SOLUTION TOPICAL ONCE
Status: COMPLETED | OUTPATIENT
Start: 2024-01-26 | End: 2024-01-26

## 2024-01-26 RX ORDER — IBUPROFEN 200 MG
TABLET ORAL ONCE
OUTPATIENT
Start: 2024-01-26 | End: 2024-01-26

## 2024-01-26 RX ADMIN — LIDOCAINE HYDROCHLORIDE 6 ML: 40 SOLUTION TOPICAL at 13:43

## 2024-01-26 ASSESSMENT — PAIN DESCRIPTION - FREQUENCY: FREQUENCY: INTERMITTENT

## 2024-01-26 ASSESSMENT — PAIN DESCRIPTION - LOCATION: LOCATION: BACK

## 2024-01-26 ASSESSMENT — PAIN DESCRIPTION - PAIN TYPE: TYPE: CHRONIC PAIN

## 2024-01-26 ASSESSMENT — PAIN DESCRIPTION - DESCRIPTORS: DESCRIPTORS: BURNING

## 2024-01-26 ASSESSMENT — PAIN - FUNCTIONAL ASSESSMENT: PAIN_FUNCTIONAL_ASSESSMENT: ACTIVITIES ARE NOT PREVENTED

## 2024-01-26 ASSESSMENT — PAIN DESCRIPTION - ONSET: ONSET: PROGRESSIVE

## 2024-01-26 ASSESSMENT — PAIN SCALES - GENERAL: PAINLEVEL_OUTOF10: 2

## 2024-01-26 NOTE — PLAN OF CARE
Problem: Wound:  Goal: Will show signs of wound healing; wound closure and no evidence of infection  Description: Will show signs of wound healing; wound closure and no evidence of infection  Outcome: Progressing     Problem: Pressure Ulcer:  Goal: Signs of wound healing will improve  Description: Signs of wound healing will improve  Outcome: Progressing     Problem: Pressure Ulcer:  Goal: Absence of new pressure ulcer  Description: Absence of new pressure ulcer  Outcome: Adequate for Discharge  Goal: Will show no infection signs and symptoms  Description: Will show no infection signs and symptoms  Outcome: Adequate for Discharge

## 2024-01-26 NOTE — PROGRESS NOTES
Wound Healing Center /Hyperbarics   History and Physical/Consultation  Vascular    Referring Physician : Massimo Yoon MD  Thai Church  MEDICAL RECORD NUMBER:  [id number]  AGE: 48 y.o.   GENDER: male  : 1975  EPISODE DATE:  2024  Subjective:     Chief Complaint   Patient presents with    Wound Check     Wound back         HISTORY of PRESENT ILLNESS HPI     Thai Church is a 48 y.o. male who presents today for wound/ulcer evaluation.   History of Wound Context:  The patient has had a wounds of both feet, right ischium, back pressure ulcers between the junction of the lumbar spine and thoracic spine which was first noted approximately at least 3 to 4 months, patient and his mother tell me that they noticed that when the patient got home from a extended care facility but they do not recall how long he had ulcers,.  This has been treated at the [place] wound care center and family came to [facility], and in the past he was treated here with good results.  On their initial visit to the wound healing center, 23, the patient has noted that the wound has not been improving.  The patient has had similar previous wounds in the past.        Patient has spina bifida with hydrocephalus, has undergone previous surgeries multiple including peritoneal venous shunt for hydrocephalus in the [place], has paraplegia, does not ambulate, recently underwent insertion of suprapubic catheter    Pt is currently not on abx.      Wound/Ulcer Pain Timing/Severity: constant  Quality of pain: dull, aching  Severity:  3 / 10   Modifying Factors: None  Associated Signs/Symptoms: drainage and pain    Ulcer Identification:  Ulcer Type: pressure and non-healing/non-surgical  Contributing Factors: lymphedema, chronic pressure, decreased mobility, and shear force    Diabetic/Pressure/Non Pressure Ulcers only:  Ulcer: N/A    If patient has diabetic lower extremity wounds  [place] Classification of diabetic lower

## 2024-01-26 NOTE — DISCHARGE INSTRUCTIONS
Visit Discharge/Physician Orders     Discharge condition: Stable     Assessment of pain at discharge: MILD     Anesthetic used: 4% LIDOCAINE     Discharge to: Home     Left via:Private automobile     Accompanied by: accompanied by mother     ECF/HHA:  [facility] *NEW ORDER     Dressing Orders: Left foot wounds, and back wound-Cleanse with saline, apply PLAIN alginate, cover and secure with dry dressings. Change daily.     Right ischium wound and left ischium wound- Cleanse with Vashe or Dakins, pack with dakins moistened guaze, cover and secure with dry dressing. Change daily.      Home care to change once weekly.      May pad wounds with foam for extra offloading      Begin to wear spandigrips bilaterally during the day, may remove at night.      Treatment Orders: Follow a nutritious diet. Choose foods high in protein: chicken, fish, and eggs. Choose foods high in Vitamin C. Multivitamin daily unless contraindicated. Offload back as often as possible. Try to change position every 2 hours. Offload feet with eggcrate cushions.        [facility] followup visit _______2 weeks___________________  (Please note your next appointment above and if you are unable to keep, kindly give a 24 hour notice. Thank you.)     Physician signature:__________________________        If you experience any of the following, please call the Wound Care Center during business hours:     * Increase in Pain  * Temperature over 101  * Increase in drainage from your wound  * Drainage with a foul odor  * Bleeding  * Increase in swelling  * Need for compression bandage changes due to slippage, breakthrough drainage.     If you need medical attention outside of the business hours of the Wound Care Centers please contact your PCP or go to the nearest emergency room.

## 2024-02-06 NOTE — DISCHARGE INSTRUCTIONS
Visit Discharge/Physician Orders     Discharge condition: Stable     Assessment of pain at discharge: MILD     Anesthetic used: 4% LIDOCAINE     Discharge to: Home     Left via:Private automobile     Accompanied by: accompanied by mother     ECF/HHA:  [facility] *NEW ORDER     Dressing Orders: To left plantar , back, right and left ischium wounds-Cleanse with saline, apply PLAIN alginate, cover and secure with dry dressings. Change daily.     Home care to change once weekly.      May pad wounds with foam for extra offloading      Begin to wear spandigrips bilaterally during the day, may remove at night.      Treatment Orders: Follow a nutritious diet. Choose foods high in protein: chicken, fish, and eggs. Choose foods high in Vitamin C. Multivitamin daily unless contraindicated. Offload back as often as possible. Try to change position every 2 hours. Offload feet with eggcrate cushions.        [facility] followup visit _______2 weeks___________________  (Please note your next appointment above and if you are unable to keep, kindly give a 24 hour notice. Thank you.)     Physician signature:__________________________        If you experience any of the following, please call the Wound Care Center during business hours:     * Increase in Pain  * Temperature over 101  * Increase in drainage from your wound  * Drainage with a foul odor  * Bleeding  * Increase in swelling  * Need for compression bandage changes due to slippage, breakthrough drainage.     If you need medical attention outside of the business hours of the Wound Care Centers please contact your PCP or go to the nearest emergency room.

## 2024-02-09 ENCOUNTER — HOSPITAL ENCOUNTER (OUTPATIENT)
Dept: WOUND CARE | Age: 49
Discharge: HOME | End: 2024-02-09
Payer: MEDICARE

## 2024-02-09 VITALS
HEART RATE: 79 BPM | TEMPERATURE: 95.2 F | DIASTOLIC BLOOD PRESSURE: 90 MMHG | SYSTOLIC BLOOD PRESSURE: 148 MMHG | RESPIRATION RATE: 18 BRPM

## 2024-02-09 DIAGNOSIS — I89.0 LYMPHEDEMA OF BOTH LOWER EXTREMITIES: ICD-10-CM

## 2024-02-09 DIAGNOSIS — L89.103 DECUBITUS ULCER OF BACK, STAGE 3 (HCC): ICD-10-CM

## 2024-02-09 DIAGNOSIS — L89.90 DECUBITUS ULCER DUE TO SPINA BIFIDA (HCC): ICD-10-CM

## 2024-02-09 DIAGNOSIS — G47.33 OBSTRUCTIVE SLEEP APNEA SYNDROME: ICD-10-CM

## 2024-02-09 DIAGNOSIS — L97.522 ULCER OF FOOT, LEFT, WITH FAT LAYER EXPOSED (HCC): ICD-10-CM

## 2024-02-09 DIAGNOSIS — L89.320: ICD-10-CM

## 2024-02-09 DIAGNOSIS — Q05.9 DECUBITUS ULCER DUE TO SPINA BIFIDA (HCC): ICD-10-CM

## 2024-02-09 DIAGNOSIS — L89.313 DECUBITUS ULCER OF ISCHIAL AREA, RIGHT, STAGE III (HCC): ICD-10-CM

## 2024-02-09 DIAGNOSIS — L97.512 ULCER OF FOOT, RIGHT, WITH FAT LAYER EXPOSED (HCC): ICD-10-CM

## 2024-02-09 DIAGNOSIS — Q05.4 SPINA BIFIDA WITH HYDROCEPHALUS, UNSPECIFIED SPINAL REGION (HCC): Primary | ICD-10-CM

## 2024-02-09 DIAGNOSIS — R09.89 DECREASED DORSALIS PEDIS PULSE: ICD-10-CM

## 2024-02-09 PROBLEM — R53.81 MALAISE: Status: RESOLVED | Noted: 2024-01-26 | Resolved: 2024-02-09

## 2024-02-09 PROCEDURE — 11045 DBRDMT SUBQ TISS EACH ADDL: CPT

## 2024-02-09 PROCEDURE — 11042 DBRDMT SUBQ TIS 1ST 20SQCM/<: CPT

## 2024-02-09 RX ORDER — IBUPROFEN 200 MG
TABLET ORAL ONCE
OUTPATIENT
Start: 2024-02-09 | End: 2024-02-09

## 2024-02-09 RX ORDER — BETAMETHASONE DIPROPIONATE 0.5 MG/G
CREAM TOPICAL ONCE
OUTPATIENT
Start: 2024-02-09 | End: 2024-02-09

## 2024-02-09 RX ORDER — SODIUM CHLOR/HYPOCHLOROUS ACID 0.033 %
SOLUTION, IRRIGATION IRRIGATION ONCE
OUTPATIENT
Start: 2024-02-09 | End: 2024-02-09

## 2024-02-09 RX ORDER — BACITRACIN ZINC AND POLYMYXIN B SULFATE 500; 1000 [USP'U]/G; [USP'U]/G
OINTMENT TOPICAL ONCE
OUTPATIENT
Start: 2024-02-09 | End: 2024-02-09

## 2024-02-09 RX ORDER — LIDOCAINE 40 MG/G
CREAM TOPICAL ONCE
OUTPATIENT
Start: 2024-02-09 | End: 2024-02-09

## 2024-02-09 RX ORDER — LIDOCAINE HYDROCHLORIDE 40 MG/ML
SOLUTION TOPICAL ONCE
Status: COMPLETED | OUTPATIENT
Start: 2024-02-09 | End: 2024-02-09

## 2024-02-09 RX ORDER — TRIAMCINOLONE ACETONIDE 1 MG/G
OINTMENT TOPICAL ONCE
OUTPATIENT
Start: 2024-02-09 | End: 2024-02-09

## 2024-02-09 RX ORDER — LIDOCAINE 50 MG/G
OINTMENT TOPICAL ONCE
OUTPATIENT
Start: 2024-02-09 | End: 2024-02-09

## 2024-02-09 RX ORDER — LIDOCAINE HYDROCHLORIDE 20 MG/ML
JELLY TOPICAL ONCE
OUTPATIENT
Start: 2024-02-09 | End: 2024-02-09

## 2024-02-09 RX ORDER — GENTAMICIN SULFATE 1 MG/G
OINTMENT TOPICAL ONCE
OUTPATIENT
Start: 2024-02-09 | End: 2024-02-09

## 2024-02-09 RX ORDER — CLOBETASOL PROPIONATE 0.5 MG/G
OINTMENT TOPICAL ONCE
OUTPATIENT
Start: 2024-02-09 | End: 2024-02-09

## 2024-02-09 RX ORDER — GINSENG 100 MG
CAPSULE ORAL ONCE
OUTPATIENT
Start: 2024-02-09 | End: 2024-02-09

## 2024-02-09 RX ORDER — LIDOCAINE HYDROCHLORIDE 40 MG/ML
SOLUTION TOPICAL ONCE
OUTPATIENT
Start: 2024-02-09 | End: 2024-02-09

## 2024-02-09 RX ADMIN — LIDOCAINE HYDROCHLORIDE: 40 SOLUTION TOPICAL at 13:18

## 2024-02-09 NOTE — PROGRESS NOTES
Wound Healing Center /Hyperbarics   History and Physical/Consultation  Vascular    Referring Physician : Massimo Yoon MD  Thai Church  MEDICAL RECORD NUMBER:  [id number]  AGE: 48 y.o.   GENDER: male  : 1975  EPISODE DATE:  2024  Subjective:     Chief Complaint   Patient presents with    Wound Check     SACRUM          HISTORY of PRESENT ILLNESS HPI     Thai Church is a 48 y.o. male who presents today for wound/ulcer evaluation.   History of Wound Context:  The patient has had a wounds of both feet, right ischium, back pressure ulcers between the junction of the lumbar spine and thoracic spine which was first noted approximately at least 3 to 4 months, patient and his mother tell me that they noticed that when the patient got home from a extended care facility but they do not recall how long he had ulcers,.  This has been treated at the [place] wound care center and family came to [facility], and in the past he was treated here with good results.  On their initial visit to the wound healing center, 23, the patient has noted that the wound has not been improving.  The patient has had similar previous wounds in the past.        Patient has spina bifida with hydrocephalus, has undergone previous surgeries multiple including peritoneal venous shunt for hydrocephalus in the [place], has paraplegia, does not ambulate, recently underwent insertion of suprapubic catheter    Pt is currently not on abx.      Wound/Ulcer Pain Timing/Severity: constant  Quality of pain: dull, aching  Severity:  3 / 10   Modifying Factors: None  Associated Signs/Symptoms: drainage and pain    Ulcer Identification:  Ulcer Type: pressure and non-healing/non-surgical  Contributing Factors: lymphedema, chronic pressure, decreased mobility, and shear force    Diabetic/Pressure/Non Pressure Ulcers only:  Ulcer: N/A    If patient has diabetic lower extremity wounds  [place] Classification of diabetic lower

## 2024-02-09 NOTE — PLAN OF CARE
Problem: Wound:  Goal: Will show signs of wound healing; wound closure and no evidence of infection  Description: Will show signs of wound healing; wound closure and no evidence of infection  Outcome: Progressing     Problem: Pressure Ulcer:  Goal: Signs of wound healing will improve  Description: Signs of wound healing will improve  Outcome: Progressing  Goal: Absence of new pressure ulcer  Description: Absence of new pressure ulcer  Outcome: Progressing  Goal: Will show no infection signs and symptoms  Description: Will show no infection signs and symptoms  Outcome: Progressing

## 2024-02-22 NOTE — DISCHARGE INSTRUCTIONS
Visit Discharge/Physician Orders     Discharge condition: Stable     Assessment of pain at discharge: MILD     Anesthetic used: 4% LIDOCAINE     Discharge to: Home     Left via:Private automobile     Accompanied by: accompanied by mother     ECF/HHA:  [facility] *NEW ORDER     Dressing Orders: To left plantar , back, right and left ischium wounds-Cleanse with saline, apply PLAIN alginate, cover and secure with dry dressings. Change daily.     Home care to change once weekly.      May pad wounds with foam for extra offloading      Begin to wear spandigrips bilaterally during the day, may remove at night.      Treatment Orders: Follow a nutritious diet. Choose foods high in protein: chicken, fish, and eggs. Choose foods high in Vitamin C. Multivitamin daily unless contraindicated. Offload back as often as possible. Try to change position every 2 hours. Offload feet with eggcrate cushions.        [facility] followup visit _______2 weeks___________________  (Please note your next appointment above and if you are unable to keep, kindly give a 24 hour notice. Thank you.)     Physician signature:__________________________        If you experience any of the following, please call the Wound Care Center during business hours:     * Increase in Pain  * Temperature over 101  * Increase in drainage from your wound  * Drainage with a foul odor  * Bleeding  * Increase in swelling  * Need for compression bandage changes due to slippage, breakthrough drainage.     If you need medical attention outside of the business hours of the Wound Care Centers please contact your PCP or go to the nearest emergency room.

## 2024-02-23 ENCOUNTER — HOSPITAL ENCOUNTER (OUTPATIENT)
Dept: WOUND CARE | Age: 49
Discharge: HOME | End: 2024-02-23
Attending: EMERGENCY MEDICINE | Admitting: SURGERY
Payer: MEDICARE

## 2024-02-23 VITALS
SYSTOLIC BLOOD PRESSURE: 142 MMHG | TEMPERATURE: 97 F | DIASTOLIC BLOOD PRESSURE: 68 MMHG | HEART RATE: 64 BPM | RESPIRATION RATE: 16 BRPM

## 2024-02-23 DIAGNOSIS — E27.40 UNSPECIFIED ADRENOCORTICAL INSUFFICIENCY (HCC): ICD-10-CM

## 2024-02-23 DIAGNOSIS — I89.0 LYMPHEDEMA OF BOTH LOWER EXTREMITIES: ICD-10-CM

## 2024-02-23 DIAGNOSIS — Q05.4 SPINA BIFIDA WITH HYDROCEPHALUS, UNSPECIFIED SPINAL REGION (HCC): Primary | ICD-10-CM

## 2024-02-23 DIAGNOSIS — L89.313 DECUBITUS ULCER OF ISCHIAL AREA, RIGHT, STAGE III (HCC): ICD-10-CM

## 2024-02-23 DIAGNOSIS — L89.90 DECUBITUS ULCER DUE TO SPINA BIFIDA (HCC): ICD-10-CM

## 2024-02-23 DIAGNOSIS — G47.33 OBSTRUCTIVE SLEEP APNEA SYNDROME: ICD-10-CM

## 2024-02-23 DIAGNOSIS — L97.522 ULCER OF FOOT, LEFT, WITH FAT LAYER EXPOSED (HCC): ICD-10-CM

## 2024-02-23 DIAGNOSIS — L89.320: ICD-10-CM

## 2024-02-23 DIAGNOSIS — L89.103 DECUBITUS ULCER OF BACK, STAGE 3 (HCC): ICD-10-CM

## 2024-02-23 DIAGNOSIS — Q05.9 DECUBITUS ULCER DUE TO SPINA BIFIDA (HCC): ICD-10-CM

## 2024-02-23 DIAGNOSIS — R09.89 DECREASED DORSALIS PEDIS PULSE: ICD-10-CM

## 2024-02-23 PROCEDURE — 11042 DBRDMT SUBQ TIS 1ST 20SQCM/<: CPT

## 2024-02-23 NOTE — PLAN OF CARE
Problem: Pressure Ulcer:  Goal: Signs of wound healing will improve  Description: Signs of wound healing will improve  Outcome: Progressing  Goal: Absence of new pressure ulcer  Description: Absence of new pressure ulcer  Outcome: Progressing  Goal: Will show no infection signs and symptoms  Description: Will show no infection signs and symptoms  Outcome: Progressing

## 2024-02-23 NOTE — PROGRESS NOTES
hydroCHLOROthiazide (MICROZIDE) 12.5 MG capsule Take 1 capsule by mouth daily Pt unsure of dosage      Multiple Vitamins-Minerals (THERAPEUTIC MULTIVITAMIN-MINERALS) tablet Take 1 tablet by mouth daily      potassium chloride (KLOR-CON) 10 MEQ extended release tablet Take 2 tablets by mouth 3 times daily      acetaminophen (TYLENOL) 325 MG tablet Take 2 tablets by mouth every 6 hours as needed for Pain      atenolol (TENORMIN) 50 MG tablet Take 1 tablet by mouth daily      albuterol (PROVENTIL) (2.5 MG/3ML) 0.083% nebulizer solution Take 3 mLs by nebulization every 6 hours as needed for Wheezing or Shortness of Breath       No current facility-administered medications on file prior to encounter.       REVIEW OF SYSTEMS   ROS : All others Negative if blank [], Positive if [x]  General Urinary   [] Fevers [] Hematuria   [] Chills [] Dysuria   [] Weight Loss Vascular   Skin [] Claudication   [x] Tissue Loss [] Rest Pain   Eyes Neurologic   [] Wears Glasses/Contacts [] Stroke/TIA   [] Vision Changes [] Focal weakness   Respiratory [] Slurred Speech    [] Shortness of breath ENT   Cardiovascular [] Difficulty swallowing   [] Chest Pain Gastrointestinal   [] Shortness of breath with exertion [] Abdominal Pain   Patient has multiple wounds, may be pressure ulcers, over the junction of the lumbar and thoracic spine, right ischial tuberosity, plantar surface of both feet with macular exposed, has spina bifida with paraplegia, status post insertion of dual-chamber hydrocephalus, obstructive sleep apnea currently on BiPAP, hypertension, status post insertion of a suprapubic catheter [] Melena       [] Hematochezia               Objective:    BP (!) 142/68   Pulse 64   Temp 97 °F (36.1 °C) (Temporal)   Resp 16   Wt Readings from Last 3 Encounters:   11/18/23 102.1 kg (225 lb)   10/20/23 104.3 kg (230 lb)   10/06/23 104.3 kg (230 lb)       PHYSICAL EXAM  CONSTITUTIONAL:   Awake, alert, cooperative  PSYCHIATRIC :  Oriented to

## 2024-03-07 NOTE — DISCHARGE INSTRUCTIONS
Visit Discharge/Physician Orders     Discharge condition: Stable     Assessment of pain at discharge: MILD     Anesthetic used: 4% LIDOCAINE     Discharge to: Home     Left via:Private automobile     Accompanied by: accompanied by mother     ECF/HHA:  [facility] *NEW ORDER     Dressing Orders: To left plantar, back, right and left ischium wounds-Cleanse with saline, apply PLAIN alginate, cover and secure with dry dressings. Change daily.     Home care to change once weekly.      May pad wounds with foam for extra offloading      Begin to wear spandigrips bilaterally during the day, may remove at night.      Treatment Orders: Follow a nutritious diet. Choose foods high in protein: chicken, fish, and eggs. Choose foods high in Vitamin C. Multivitamin daily unless contraindicated. Offload back as often as possible. Try to change position every 2 hours. Offload feet with eggcrate cushions.    Increase protein in diet to aid in healing process- ISOPURE, Ensure    3/8 left foot culture taken- will review next week            [facility] followup visit _______2 weeks___________________  (Please note your next appointment above and if you are unable to keep, kindly give a 24 hour notice. Thank you.)     Physician signature:__________________________        If you experience any of the following, please call the Wound Care Center during business hours:     * Increase in Pain  * Temperature over 101  * Increase in drainage from your wound  * Drainage with a foul odor  * Bleeding  * Increase in swelling  * Need for compression bandage changes due to slippage, breakthrough drainage.     If you need medical attention outside of the business hours of the Wound Care Centers please contact your PCP or go to the nearest emergency room.

## 2024-03-08 ENCOUNTER — HOSPITAL ENCOUNTER (OUTPATIENT)
Dept: WOUND CARE | Age: 49
Discharge: HOME | End: 2024-03-08
Attending: EMERGENCY MEDICINE | Admitting: SURGERY
Payer: MEDICARE

## 2024-03-08 VITALS
HEART RATE: 69 BPM | SYSTOLIC BLOOD PRESSURE: 144 MMHG | TEMPERATURE: 95.2 F | DIASTOLIC BLOOD PRESSURE: 79 MMHG | RESPIRATION RATE: 18 BRPM

## 2024-03-08 DIAGNOSIS — I89.0 LYMPHEDEMA OF BOTH LOWER EXTREMITIES: ICD-10-CM

## 2024-03-08 DIAGNOSIS — Q05.4 SPINA BIFIDA WITH HYDROCEPHALUS, UNSPECIFIED SPINAL REGION (HCC): ICD-10-CM

## 2024-03-08 DIAGNOSIS — L89.90 DECUBITUS ULCER DUE TO SPINA BIFIDA (HCC): Primary | ICD-10-CM

## 2024-03-08 DIAGNOSIS — L97.512 ULCER OF FOOT, RIGHT, WITH FAT LAYER EXPOSED (HCC): ICD-10-CM

## 2024-03-08 DIAGNOSIS — G82.20 PARAPLEGIA (HCC): ICD-10-CM

## 2024-03-08 DIAGNOSIS — Q05.9 DECUBITUS ULCER DUE TO SPINA BIFIDA (HCC): Primary | ICD-10-CM

## 2024-03-08 DIAGNOSIS — L89.313 DECUBITUS ULCER OF ISCHIAL AREA, RIGHT, STAGE III (HCC): ICD-10-CM

## 2024-03-08 DIAGNOSIS — G47.33 OBSTRUCTIVE SLEEP APNEA SYNDROME: ICD-10-CM

## 2024-03-08 DIAGNOSIS — R09.89 DECREASED DORSALIS PEDIS PULSE: ICD-10-CM

## 2024-03-08 DIAGNOSIS — L89.103 DECUBITUS ULCER OF BACK, STAGE 3 (HCC): ICD-10-CM

## 2024-03-08 DIAGNOSIS — E27.40 UNSPECIFIED ADRENOCORTICAL INSUFFICIENCY (HCC): ICD-10-CM

## 2024-03-08 DIAGNOSIS — L97.522 ULCER OF FOOT, LEFT, WITH FAT LAYER EXPOSED (HCC): ICD-10-CM

## 2024-03-08 PROBLEM — Z98.890 STATUS POST SURGERY: Status: RESOLVED | Noted: 2023-12-08 | Resolved: 2024-03-08

## 2024-03-08 PROCEDURE — 87070 CULTURE OTHR SPECIMN AEROBIC: CPT

## 2024-03-08 PROCEDURE — 11042 DBRDMT SUBQ TIS 1ST 20SQCM/<: CPT

## 2024-03-08 PROCEDURE — 87205 SMEAR GRAM STAIN: CPT

## 2024-03-08 RX ORDER — LIDOCAINE 40 MG/G
CREAM TOPICAL ONCE
OUTPATIENT
Start: 2024-03-08 | End: 2024-03-08

## 2024-03-08 RX ORDER — LIDOCAINE HYDROCHLORIDE 40 MG/ML
SOLUTION TOPICAL ONCE
Status: COMPLETED | OUTPATIENT
Start: 2024-03-08 | End: 2024-03-08

## 2024-03-08 RX ORDER — SODIUM CHLOR/HYPOCHLOROUS ACID 0.033 %
SOLUTION, IRRIGATION IRRIGATION ONCE
OUTPATIENT
Start: 2024-03-08 | End: 2024-03-08

## 2024-03-08 RX ORDER — CLOBETASOL PROPIONATE 0.5 MG/G
OINTMENT TOPICAL ONCE
OUTPATIENT
Start: 2024-03-08 | End: 2024-03-08

## 2024-03-08 RX ORDER — GENTAMICIN SULFATE 1 MG/G
OINTMENT TOPICAL ONCE
OUTPATIENT
Start: 2024-03-08 | End: 2024-03-08

## 2024-03-08 RX ORDER — TRIAMCINOLONE ACETONIDE 1 MG/G
OINTMENT TOPICAL ONCE
OUTPATIENT
Start: 2024-03-08 | End: 2024-03-08

## 2024-03-08 RX ORDER — BETAMETHASONE DIPROPIONATE 0.5 MG/G
CREAM TOPICAL ONCE
OUTPATIENT
Start: 2024-03-08 | End: 2024-03-08

## 2024-03-08 RX ORDER — LIDOCAINE HYDROCHLORIDE 20 MG/ML
JELLY TOPICAL ONCE
OUTPATIENT
Start: 2024-03-08 | End: 2024-03-08

## 2024-03-08 RX ORDER — IBUPROFEN 200 MG
TABLET ORAL ONCE
OUTPATIENT
Start: 2024-03-08 | End: 2024-03-08

## 2024-03-08 RX ORDER — LIDOCAINE HYDROCHLORIDE 40 MG/ML
SOLUTION TOPICAL ONCE
OUTPATIENT
Start: 2024-03-08 | End: 2024-03-08

## 2024-03-08 RX ORDER — GINSENG 100 MG
CAPSULE ORAL ONCE
OUTPATIENT
Start: 2024-03-08 | End: 2024-03-08

## 2024-03-08 RX ORDER — BACITRACIN ZINC AND POLYMYXIN B SULFATE 500; 1000 [USP'U]/G; [USP'U]/G
OINTMENT TOPICAL ONCE
OUTPATIENT
Start: 2024-03-08 | End: 2024-03-08

## 2024-03-08 RX ORDER — LIDOCAINE 50 MG/G
OINTMENT TOPICAL ONCE
OUTPATIENT
Start: 2024-03-08 | End: 2024-03-08

## 2024-03-08 RX ADMIN — LIDOCAINE HYDROCHLORIDE: 40 SOLUTION TOPICAL at 13:09

## 2024-03-08 NOTE — PROGRESS NOTES
smaller, appearance also worsening   All other wounds overall appearances continue to improve, measuring smaller  Wounds debrided   Culture obtained   Continue to cleanse all wounds with Dakin's or Vashe  Dakin's (or Vashe) packing to right foot   Continue plain aquacel to all other wounds   Patient sent to ER for further evaluation and surgical consult - concern for deep tissue injury of scrotum and left ischium     12/8/23  Left plantar foot 3rd digit wound healed   Patient was transported to [facility] and had surgical debridedment of bilateral ischial wounds   Bilateral ischial/scrotal wounds measuring smaller, appearance also improved   All other wounds overall appearances continue to improve, measuring smaller  Wounds debrided   Continue to cleanse bilateral ischium with Dakin's or Vashe  Dakin's (or Vashe) packing to bilateral ischium  Plain aquacel to all other wounds     12/22/23  Right plantar foot wound healed   Left ischial wound measuring smaller, appearance continues to improve  Right ischial and left plantar foot wounds measuring larger  Back wound overall appearances continue to improve  Continue to cleanse bilateral ischium with Dakin's or Vashe  Dakin's (or Vashe) packing to bilateral ischium  Plain aquacel to all other wounds     1/5/24  Left plantar foot 3rd metatarsal wound healed   Left ischial wound measuring smaller, appearance continues to improve  Right ischial wound appearance improving but measuring larger - discrepancy in measurements seem to be positional   Back wound overall appearances continue to improve, measuring smaller   Continue to cleanse bilateral ischium with Dakin's or Vashe  Dakin's (or Vashe) packing to bilateral ischium  Plain aquacel to all other wounds     1/26/24  New left plantar foot wound    Bilateral ischial wound measuring smaller, appearance continues to improve  Back wound appearances continue to improve, measuring smaller   Continue to cleanse bilateral

## 2024-03-22 ENCOUNTER — HOSPITAL ENCOUNTER (OUTPATIENT)
Dept: WOUND CARE | Age: 49
Discharge: HOME | End: 2024-03-22
Attending: EMERGENCY MEDICINE | Admitting: SURGERY
Payer: MEDICARE

## 2024-03-22 VITALS
RESPIRATION RATE: 18 BRPM | TEMPERATURE: 97.4 F | HEART RATE: 84 BPM | SYSTOLIC BLOOD PRESSURE: 132 MMHG | DIASTOLIC BLOOD PRESSURE: 80 MMHG

## 2024-03-22 DIAGNOSIS — G82.20 PARAPLEGIA (HCC): ICD-10-CM

## 2024-03-22 DIAGNOSIS — R09.89 DECREASED DORSALIS PEDIS PULSE: ICD-10-CM

## 2024-03-22 DIAGNOSIS — L89.313 DECUBITUS ULCER OF ISCHIAL AREA, RIGHT, STAGE III (HCC): ICD-10-CM

## 2024-03-22 DIAGNOSIS — G91.2 (IDIOPATHIC) NORMAL PRESSURE HYDROCEPHALUS (HCC): ICD-10-CM

## 2024-03-22 DIAGNOSIS — G47.33 OBSTRUCTIVE SLEEP APNEA SYNDROME: ICD-10-CM

## 2024-03-22 DIAGNOSIS — I89.0 LYMPHEDEMA OF BOTH LOWER EXTREMITIES: ICD-10-CM

## 2024-03-22 DIAGNOSIS — Q05.9 DECUBITUS ULCER DUE TO SPINA BIFIDA (HCC): Primary | ICD-10-CM

## 2024-03-22 DIAGNOSIS — L89.103 DECUBITUS ULCER OF BACK, STAGE 3 (HCC): ICD-10-CM

## 2024-03-22 DIAGNOSIS — L89.90 DECUBITUS ULCER DUE TO SPINA BIFIDA (HCC): Primary | ICD-10-CM

## 2024-03-22 DIAGNOSIS — Q05.4 SPINA BIFIDA WITH HYDROCEPHALUS, UNSPECIFIED SPINAL REGION (HCC): ICD-10-CM

## 2024-03-22 DIAGNOSIS — L97.512 ULCER OF FOOT, RIGHT, WITH FAT LAYER EXPOSED (HCC): ICD-10-CM

## 2024-03-22 DIAGNOSIS — L97.522 ULCER OF FOOT, LEFT, WITH FAT LAYER EXPOSED (HCC): ICD-10-CM

## 2024-03-22 PROBLEM — E27.40 UNSPECIFIED ADRENOCORTICAL INSUFFICIENCY (HCC): Status: RESOLVED | Noted: 2023-03-02 | Resolved: 2024-03-22

## 2024-03-22 PROBLEM — L89.320: Status: RESOLVED | Noted: 2023-11-17 | Resolved: 2024-03-22

## 2024-03-22 PROCEDURE — 11042 DBRDMT SUBQ TIS 1ST 20SQCM/<: CPT

## 2024-03-22 RX ORDER — IBUPROFEN 200 MG
TABLET ORAL ONCE
OUTPATIENT
Start: 2024-03-22 | End: 2024-03-22

## 2024-03-22 RX ORDER — CLOBETASOL PROPIONATE 0.5 MG/G
OINTMENT TOPICAL ONCE
OUTPATIENT
Start: 2024-03-22 | End: 2024-03-22

## 2024-03-22 RX ORDER — BETAMETHASONE DIPROPIONATE 0.5 MG/G
CREAM TOPICAL ONCE
OUTPATIENT
Start: 2024-03-22 | End: 2024-03-22

## 2024-03-22 RX ORDER — GENTAMICIN SULFATE 1 MG/G
OINTMENT TOPICAL ONCE
OUTPATIENT
Start: 2024-03-22 | End: 2024-03-22

## 2024-03-22 RX ORDER — LIDOCAINE HYDROCHLORIDE 40 MG/ML
SOLUTION TOPICAL ONCE
OUTPATIENT
Start: 2024-03-22 | End: 2024-03-22

## 2024-03-22 RX ORDER — LIDOCAINE 50 MG/G
OINTMENT TOPICAL ONCE
OUTPATIENT
Start: 2024-03-22 | End: 2024-03-22

## 2024-03-22 RX ORDER — DOXYCYCLINE HYCLATE 100 MG
100 TABLET ORAL 2 TIMES DAILY
Qty: 20 TABLET | Refills: 0 | Status: SHIPPED | OUTPATIENT
Start: 2024-03-22 | End: 2024-04-01

## 2024-03-22 RX ORDER — LIDOCAINE HYDROCHLORIDE 40 MG/ML
SOLUTION TOPICAL ONCE
Status: COMPLETED | OUTPATIENT
Start: 2024-03-22 | End: 2024-03-22

## 2024-03-22 RX ORDER — LIDOCAINE 40 MG/G
CREAM TOPICAL ONCE
OUTPATIENT
Start: 2024-03-22 | End: 2024-03-22

## 2024-03-22 RX ORDER — TRIAMCINOLONE ACETONIDE 1 MG/G
OINTMENT TOPICAL ONCE
OUTPATIENT
Start: 2024-03-22 | End: 2024-03-22

## 2024-03-22 RX ORDER — GINSENG 100 MG
CAPSULE ORAL ONCE
OUTPATIENT
Start: 2024-03-22 | End: 2024-03-22

## 2024-03-22 RX ORDER — BACITRACIN ZINC AND POLYMYXIN B SULFATE 500; 1000 [USP'U]/G; [USP'U]/G
OINTMENT TOPICAL ONCE
OUTPATIENT
Start: 2024-03-22 | End: 2024-03-22

## 2024-03-22 RX ORDER — LIDOCAINE HYDROCHLORIDE 20 MG/ML
JELLY TOPICAL ONCE
OUTPATIENT
Start: 2024-03-22 | End: 2024-03-22

## 2024-03-22 RX ORDER — GENTAMICIN SULFATE 1 MG/G
OINTMENT TOPICAL
Qty: 30 G | Refills: 1 | Status: SHIPPED | OUTPATIENT
Start: 2024-03-22 | End: 2024-03-29

## 2024-03-22 RX ORDER — SODIUM CHLOR/HYPOCHLOROUS ACID 0.033 %
SOLUTION, IRRIGATION IRRIGATION ONCE
OUTPATIENT
Start: 2024-03-22 | End: 2024-03-22

## 2024-03-22 RX ADMIN — LIDOCAINE HYDROCHLORIDE 5 ML: 40 SOLUTION TOPICAL at 14:01

## 2024-03-22 NOTE — PROGRESS NOTES
without any calf tenderness      R femoral 2 L femoral 2   R dorsalis pedis 2 L dorsalis pedis 2   R posterior tibial 0 L posterior tibial 0     Assessment:     Problem List Items Addressed This Visit       (Idiopathic) normal pressure hydrocephalus (HCC)    Lymphedema of both lower extremities    Obstructive sleep apnea syndrome    Paraplegia (HCC)    Spina bifida with hydrocephalus (HCC)    Decreased dorsalis pedis pulse    Decubitus ulcer due to spina bifida (HCC) - Primary    Relevant Orders    Initiate Outpatient Wound Care Protocol    Decubitus ulcer of back, stage 3 (HCC)    Relevant Orders    Initiate Outpatient Wound Care Protocol    Decubitus ulcer of ischial area, right, stage III (HCC)    Relevant Orders    Initiate Outpatient Wound Care Protocol    Ulcer of foot, left, with fat layer exposed (HCC)    Relevant Orders    Initiate Outpatient Wound Care Protocol    Ulcer of foot, right, with fat layer exposed (HCC)    Relevant Orders    Initiate Outpatient Wound Care Protocol     Pre Debridement Measurements:  Are located in the Wound/Ulcer Documentation Flow Sheet  Post Debridement Measurements:  Wound/Ulcer Descriptions are Pre Debridement except measurements:  Wound 07/07/23 Back new wound #1 back pressure (Active)   Wound Image   01/26/24 1308   Wound Etiology Pressure Stage 3 07/07/23 1449   Dressing Status New dressing applied 03/22/24 1430   Wound Cleansed Cleansed with saline 03/22/24 1430   Dressing/Treatment Alginate;Foam;Silicone border 03/22/24 1430   Wound Length (cm) 2.3 cm 03/22/24 1347   Wound Width (cm) 0.9 cm 03/22/24 1347   Wound Depth (cm) 0.1 cm 03/22/24 1347   Wound Surface Area (cm^2) 2.07 cm^2 03/22/24 1347   Change in Wound Size % (l*w) 95.75 03/22/24 1347   Wound Volume (cm^3) 0.207 cm^3 03/22/24 1347   Wound Healing % 98 03/22/24 1347   Post-Procedure Length (cm) 2.4 cm 03/22/24 1430   Post-Procedure Width (cm) 1 cm 03/22/24 1430   Post-Procedure Depth (cm) 0.1 cm 03/22/24 1430

## 2024-03-22 NOTE — DISCHARGE INSTRUCTIONS
Hailey Clemons is a 68 year old female     Chief Complaint: follow up    Joints affected: all joints  Pain level: 5/10  Area's of Joint stiffness: knees shoulders, hands,     Denies known Latex allergy or symptoms of Latex sensitivity.    Medications verified, updated and reviewed.  Patient taking NSAIDS: motrin  if Yes then which Nsaids are being taken: as needed.    Patient taking Prednisone:  no   Allopurinol 100 MG 1 tab daily         Allergies verified, updates and reviewed.  Tobacco history verified 10/21/2020.  Pharmacy is verified.  PCP verified or updated.   Harvey Campbell MD     please contact your PCP or go to the nearest emergency room.

## 2024-03-22 NOTE — PLAN OF CARE
Problem: Wound:  Goal: Will show signs of wound healing; wound closure and no evidence of infection  Description: Will show signs of wound healing; wound closure and no evidence of infection  Outcome: Progressing     Problem: Pressure Ulcer:  Goal: Signs of wound healing will improve  Description: Signs of wound healing will improve  Outcome: Progressing  Goal: Absence of new pressure ulcer  Description: Absence of new pressure ulcer  Outcome: Progressing

## 2024-04-02 NOTE — DISCHARGE INSTRUCTIONS
Visit Discharge/Physician Orders     Discharge condition: Stable     Assessment of pain at discharge: MILD     Anesthetic used: 4% LIDOCAINE     Discharge to: Home     Left via:Private automobile     Accompanied by: accompanied by mother     ECF/HHA:  [facility] - PLEASE FAX NEW ORDER     Dressing Orders: To left plantar- cleanse with saline, apply gentamicin ointment, PLAIN calcium alginate and dry dressing. Change daily and as needed.     back, right and left ischium wounds-Cleanse with saline, apply PLAIN alginate, cover and secure with dry dressings. Change daily.     Home care to change once weekly. - PLEASE ONLY USE PLAIN CALCIUM ALGINATE      May pad wounds with foam for extra offloading      Begin to wear spandigrips bilaterally during the day, may remove at night.      Treatment Orders: Follow a nutritious diet. Choose foods high in protein: chicken, fish, and eggs. Choose foods high in Vitamin C. Multivitamin daily unless contraindicated. Offload back as often as possible. Try to change position every 2 hours. Offload feet with eggcrate cushions.     Increase protein in diet to aid in healing process- ISOPURE, Ensure     3/8 left foot culture taken- will send doxycycline and gentamicin ointment to pharmacy please take with food              [facility] followup visit _______2 weeks / 2 weeks Dr. Baron in [place]___________________  (Please note your next appointment above and if you are unable to keep, kindly give a 24 hour notice. Thank you.)     Physician signature:__________________________        If you experience any of the following, please call the Wound Care Center during business hours:     * Increase in Pain  * Temperature over 101  * Increase in drainage from your wound  * Drainage with a foul odor  * Bleeding  * Increase in swelling  * Need for compression bandage changes due to slippage, breakthrough drainage.     If you need medical attention outside of the business hours of the Wound Care

## 2024-04-04 ENCOUNTER — HOSPITAL ENCOUNTER (OUTPATIENT)
Dept: WOUND CARE | Age: 49
Discharge: HOME | End: 2024-04-04

## 2024-04-05 ENCOUNTER — HOSPITAL ENCOUNTER (OUTPATIENT)
Dept: WOUND CARE | Age: 49
Discharge: HOME | End: 2024-04-05
Attending: EMERGENCY MEDICINE | Admitting: SURGERY
Payer: MEDICARE

## 2024-04-05 VITALS
HEART RATE: 87 BPM | SYSTOLIC BLOOD PRESSURE: 168 MMHG | RESPIRATION RATE: 18 BRPM | DIASTOLIC BLOOD PRESSURE: 88 MMHG | TEMPERATURE: 96.4 F

## 2024-04-05 DIAGNOSIS — L97.512 ULCER OF FOOT, RIGHT, WITH FAT LAYER EXPOSED (HCC): ICD-10-CM

## 2024-04-05 DIAGNOSIS — L89.103 DECUBITUS ULCER OF BACK, STAGE 3 (HCC): ICD-10-CM

## 2024-04-05 DIAGNOSIS — L89.90 DECUBITUS ULCER DUE TO SPINA BIFIDA (HCC): Primary | ICD-10-CM

## 2024-04-05 DIAGNOSIS — L89.313 DECUBITUS ULCER OF ISCHIAL AREA, RIGHT, STAGE III (HCC): ICD-10-CM

## 2024-04-05 DIAGNOSIS — Q05.9 DECUBITUS ULCER DUE TO SPINA BIFIDA (HCC): Primary | ICD-10-CM

## 2024-04-05 DIAGNOSIS — L97.522 ULCER OF FOOT, LEFT, WITH FAT LAYER EXPOSED (HCC): ICD-10-CM

## 2024-04-05 PROCEDURE — 11042 DBRDMT SUBQ TIS 1ST 20SQCM/<: CPT

## 2024-04-05 RX ORDER — CLOBETASOL PROPIONATE 0.5 MG/G
OINTMENT TOPICAL ONCE
OUTPATIENT
Start: 2024-04-05 | End: 2024-04-05

## 2024-04-05 RX ORDER — IBUPROFEN 200 MG
TABLET ORAL ONCE
OUTPATIENT
Start: 2024-04-05 | End: 2024-04-05

## 2024-04-05 RX ORDER — GINSENG 100 MG
CAPSULE ORAL ONCE
OUTPATIENT
Start: 2024-04-05 | End: 2024-04-05

## 2024-04-05 RX ORDER — LIDOCAINE HYDROCHLORIDE 40 MG/ML
SOLUTION TOPICAL ONCE
OUTPATIENT
Start: 2024-04-05 | End: 2024-04-05

## 2024-04-05 RX ORDER — SODIUM CHLOR/HYPOCHLOROUS ACID 0.033 %
SOLUTION, IRRIGATION IRRIGATION ONCE
OUTPATIENT
Start: 2024-04-05 | End: 2024-04-05

## 2024-04-05 RX ORDER — GENTAMICIN SULFATE 1 MG/G
OINTMENT TOPICAL ONCE
OUTPATIENT
Start: 2024-04-05 | End: 2024-04-05

## 2024-04-05 RX ORDER — LIDOCAINE HYDROCHLORIDE 40 MG/ML
SOLUTION TOPICAL ONCE
Status: COMPLETED | OUTPATIENT
Start: 2024-04-05 | End: 2024-04-05

## 2024-04-05 RX ORDER — LIDOCAINE 40 MG/G
CREAM TOPICAL ONCE
OUTPATIENT
Start: 2024-04-05 | End: 2024-04-05

## 2024-04-05 RX ORDER — BACITRACIN ZINC AND POLYMYXIN B SULFATE 500; 1000 [USP'U]/G; [USP'U]/G
OINTMENT TOPICAL ONCE
OUTPATIENT
Start: 2024-04-05 | End: 2024-04-05

## 2024-04-05 RX ORDER — LIDOCAINE HYDROCHLORIDE 20 MG/ML
JELLY TOPICAL ONCE
OUTPATIENT
Start: 2024-04-05 | End: 2024-04-05

## 2024-04-05 RX ORDER — TRIAMCINOLONE ACETONIDE 1 MG/G
OINTMENT TOPICAL ONCE
OUTPATIENT
Start: 2024-04-05 | End: 2024-04-05

## 2024-04-05 RX ORDER — BETAMETHASONE DIPROPIONATE 0.5 MG/G
CREAM TOPICAL ONCE
OUTPATIENT
Start: 2024-04-05 | End: 2024-04-05

## 2024-04-05 RX ORDER — LIDOCAINE 50 MG/G
OINTMENT TOPICAL ONCE
OUTPATIENT
Start: 2024-04-05 | End: 2024-04-05

## 2024-04-05 RX ADMIN — LIDOCAINE HYDROCHLORIDE: 40 SOLUTION TOPICAL at 13:43

## 2024-04-05 NOTE — PLAN OF CARE
Problem: Pressure Ulcer:  Goal: Signs of wound healing will improve  Description: Signs of wound healing will improve  Outcome: Progressing     Problem: Pressure Ulcer:  Goal: Will show no infection signs and symptoms  Description: Will show no infection signs and symptoms  Outcome: Progressing

## 2024-04-05 NOTE — PROGRESS NOTES
ischium with Dakin's or Vashe  Dakin's (or Vashe) packing to bilateral ischium  Plain aquacel to all other wounds   Patient general malaise without fever at today's visit    2/9/24  Left plantar foot wound discolored  but measuring slightly smaller   Wounds debrided   Bilateral ischial wound measuring smaller, appearance continues to improve  Back wound appearances continue to improve, measuring smaller   Plain aquacel and dry dressing to all other wounds     2/23/24  Left ischium wound healed   Left plantar foot wound appearance improving, measuring slightly smaller   Right ischium and back wounds continue  to improve, measuring smaller   Wounds debrided   Plain aquacel and dry dressing to all wounds     3/8/24  Left distal plantar appearance improving but measuring larger and deeper  Left midfoot plantar foot wound appearance improving, measuring smaller   Right ischium and back wounds continue  to improve, measuring smaller   Wounds debrided   Distal plantar wound cultures obtained   Continue Plain aquacel and dry dressing to all wounds     3/22/24  Left foot plantar wounds appearances stable but measuring larger and deeper  Right ischium and back wounds continue  to improve, measuring smaller   Wounds debrided   Doxycycline 100mg BID [id number] prescribed   Gentamicin ointment prescribed (patient has allergy but states he tolerates topically)  Continue Plain aquacel and dry dressing to all wounds   Discussed trying to add a pillow to foot plate of wheelchair to prevent pressure     4/5/2024  Ulcers, over the thoracic spine area looks clean, ischial ulcer healing well, patient has 2 wounds, right left plantar area, the right appears to be new, last 1 week, looks fairly clean    PAST MEDICAL HISTORY      Diagnosis Date    Decreased dorsalis pedis pulse 07/07/2023    Decubitus ulcer due to spina bifida (HCC) 07/07/2023    Decubitus ulcer of back, stage 3 (HCC) 07/07/2023    Decubitus ulcer of ischial area, right,

## 2024-04-19 ENCOUNTER — HOSPITAL ENCOUNTER (OUTPATIENT)
Dept: WOUND CARE | Age: 49
Discharge: HOME | End: 2024-04-19
Attending: EMERGENCY MEDICINE | Admitting: SURGERY
Payer: MEDICARE

## 2024-04-19 VITALS
BODY MASS INDEX: 40.75 KG/M2 | DIASTOLIC BLOOD PRESSURE: 64 MMHG | HEART RATE: 64 BPM | TEMPERATURE: 96.3 F | RESPIRATION RATE: 18 BRPM | HEIGHT: 63 IN | SYSTOLIC BLOOD PRESSURE: 110 MMHG | WEIGHT: 230 LBS

## 2024-04-19 DIAGNOSIS — I89.0 LYMPHEDEMA OF BOTH LOWER EXTREMITIES: ICD-10-CM

## 2024-04-19 DIAGNOSIS — Q05.9 DECUBITUS ULCER DUE TO SPINA BIFIDA (HCC): Primary | ICD-10-CM

## 2024-04-19 DIAGNOSIS — L89.313 DECUBITUS ULCER OF ISCHIAL AREA, RIGHT, STAGE III (HCC): ICD-10-CM

## 2024-04-19 DIAGNOSIS — L89.103 DECUBITUS ULCER OF BACK, STAGE 3 (HCC): ICD-10-CM

## 2024-04-19 DIAGNOSIS — L97.512 ULCER OF FOOT, RIGHT, WITH FAT LAYER EXPOSED (HCC): ICD-10-CM

## 2024-04-19 DIAGNOSIS — R09.89 DECREASED DORSALIS PEDIS PULSE: ICD-10-CM

## 2024-04-19 DIAGNOSIS — G82.20 PARAPLEGIA (HCC): ICD-10-CM

## 2024-04-19 DIAGNOSIS — L89.90 DECUBITUS ULCER DUE TO SPINA BIFIDA (HCC): Primary | ICD-10-CM

## 2024-04-19 DIAGNOSIS — L97.522 ULCER OF FOOT, LEFT, WITH FAT LAYER EXPOSED (HCC): ICD-10-CM

## 2024-04-19 DIAGNOSIS — Q05.4 SPINA BIFIDA WITH HYDROCEPHALUS, UNSPECIFIED SPINAL REGION (HCC): ICD-10-CM

## 2024-04-19 PROCEDURE — 11045 DBRDMT SUBQ TISS EACH ADDL: CPT

## 2024-04-19 PROCEDURE — 11042 DBRDMT SUBQ TIS 1ST 20SQCM/<: CPT

## 2024-04-19 RX ORDER — LIDOCAINE 50 MG/G
OINTMENT TOPICAL ONCE
OUTPATIENT
Start: 2024-04-19 | End: 2024-04-19

## 2024-04-19 RX ORDER — LIDOCAINE HYDROCHLORIDE 40 MG/ML
SOLUTION TOPICAL ONCE
OUTPATIENT
Start: 2024-04-19 | End: 2024-04-19

## 2024-04-19 RX ORDER — BACITRACIN ZINC AND POLYMYXIN B SULFATE 500; 1000 [USP'U]/G; [USP'U]/G
OINTMENT TOPICAL ONCE
OUTPATIENT
Start: 2024-04-19 | End: 2024-04-19

## 2024-04-19 RX ORDER — LIDOCAINE 40 MG/G
CREAM TOPICAL ONCE
OUTPATIENT
Start: 2024-04-19 | End: 2024-04-19

## 2024-04-19 RX ORDER — LIDOCAINE HYDROCHLORIDE 40 MG/ML
SOLUTION TOPICAL ONCE
Status: COMPLETED | OUTPATIENT
Start: 2024-04-19 | End: 2024-04-19

## 2024-04-19 RX ORDER — TRIAMCINOLONE ACETONIDE 1 MG/G
OINTMENT TOPICAL ONCE
OUTPATIENT
Start: 2024-04-19 | End: 2024-04-19

## 2024-04-19 RX ORDER — GENTAMICIN SULFATE 1 MG/G
OINTMENT TOPICAL ONCE
OUTPATIENT
Start: 2024-04-19 | End: 2024-04-19

## 2024-04-19 RX ORDER — LIDOCAINE HYDROCHLORIDE 20 MG/ML
JELLY TOPICAL ONCE
OUTPATIENT
Start: 2024-04-19 | End: 2024-04-19

## 2024-04-19 RX ORDER — IBUPROFEN 200 MG
TABLET ORAL ONCE
OUTPATIENT
Start: 2024-04-19 | End: 2024-04-19

## 2024-04-19 RX ORDER — PREDNISONE 1 MG/1
1 TABLET ORAL DAILY
COMMUNITY

## 2024-04-19 RX ORDER — AZITHROMYCIN 1 G/1
1 POWDER, FOR SUSPENSION ORAL ONCE
COMMUNITY

## 2024-04-19 RX ORDER — CLOBETASOL PROPIONATE 0.5 MG/G
OINTMENT TOPICAL ONCE
OUTPATIENT
Start: 2024-04-19 | End: 2024-04-19

## 2024-04-19 RX ORDER — BETAMETHASONE DIPROPIONATE 0.5 MG/G
CREAM TOPICAL ONCE
OUTPATIENT
Start: 2024-04-19 | End: 2024-04-19

## 2024-04-19 RX ORDER — GINSENG 100 MG
CAPSULE ORAL ONCE
OUTPATIENT
Start: 2024-04-19 | End: 2024-04-19

## 2024-04-19 RX ORDER — SODIUM CHLOR/HYPOCHLOROUS ACID 0.033 %
SOLUTION, IRRIGATION IRRIGATION ONCE
OUTPATIENT
Start: 2024-04-19 | End: 2024-04-19

## 2024-04-19 RX ADMIN — LIDOCAINE HYDROCHLORIDE 5 ML: 40 SOLUTION TOPICAL at 13:31

## 2024-04-19 NOTE — DISCHARGE INSTRUCTIONS
Visit Discharge/Physician Orders     Discharge condition: Stable     Assessment of pain at discharge: MILD     Anesthetic used: 4% LIDOCAINE     Discharge to: Home     Left via:Private automobile     Accompanied by: accompanied by mother     ECF/HHA:  [facility] - PLEASE FAX NEW ORDER     Dressing Orders: To left plantar- cleanse with saline, apply PLAIN calcium alginate and dry dressing. Change daily and as needed.     back,  and right plantar wounds-Cleanse with saline, apply PLAIN alginate, cover and secure with dry dressings. Change daily.     Home care to change once weekly. - PLEASE ONLY USE PLAIN CALCIUM ALGINATE      May pad wounds with foam for extra offloading      Begin to wear spandigrips bilaterally during the day, may remove at night.      Treatment Orders: Follow a nutritious diet. Choose foods high in protein: chicken, fish, and eggs. Choose foods high in Vitamin C. Multivitamin daily unless contraindicated. Offload back as often as possible. Try to change position every 2 hours. Offload feet with eggcrate cushions.     Increase protein in diet to aid in healing process- ISOPURE, Ensure                [facility] followup visit _______2 weeks ___________________  (Please note your next appointment above and if you are unable to keep, kindly give a 24 hour notice. Thank you.)     Physician signature:__________________________        If you experience any of the following, please call the Wound Care Center during business hours:     * Increase in Pain  * Temperature over 101  * Increase in drainage from your wound  * Drainage with a foul odor  * Bleeding  * Increase in swelling  * Need for compression bandage changes due to slippage, breakthrough drainage.     If you need medical attention outside of the business hours of the Wound Care Centers please contact your PCP or go to the nearest emergency room.

## 2024-04-19 NOTE — PROGRESS NOTES
cleanse bilateral ischium with Dakin's or Vashe  Dakin's (or Vashe) packing to bilateral ischium  Plain aquacel to all other wounds   Patient general malaise without fever at today's visit    2/9/24  Left plantar foot wound discolored  but measuring slightly smaller   Wounds debrided   Bilateral ischial wound measuring smaller, appearance continues to improve  Back wound appearances continue to improve, measuring smaller   Plain aquacel and dry dressing to all other wounds     2/23/24  Left ischium wound healed   Left plantar foot wound appearance improving, measuring slightly smaller   Right ischium and back wounds continue  to improve, measuring smaller   Wounds debrided   Plain aquacel and dry dressing to all wounds     3/8/24  Left distal plantar appearance improving but measuring larger and deeper  Left midfoot plantar foot wound appearance improving, measuring smaller   Right ischium and back wounds continue  to improve, measuring smaller   Wounds debrided   Distal plantar wound cultures obtained   Continue Plain aquacel and dry dressing to all wounds     3/22/24  Left foot plantar wounds appearances stable but measuring larger and deeper  Right ischium and back wounds continue  to improve, measuring smaller   Wounds debrided   Doxycycline 100mg BID [id number] prescribed   Gentamicin ointment prescribed (patient has allergy but states he tolerates topically)  Continue Plain aquacel and dry dressing to all wounds   Discussed trying to add a pillow to foot plate of wheelchair to prevent pressure     4/5/2024  Ulcers, over the thoracic spine area looks clean, ischial ulcer healing well, patient has 2 wounds, right left plantar area, the right appears to be new, last 1 week, looks fairly clean    4/19/24  Plantar and back wounds measuring larger   Wheelchair company to come out to adjust foot placement   Wounds debrided   Continue aquacel and dry dressing to all wounds     PAST MEDICAL HISTORY      Diagnosis Date

## 2024-05-02 NOTE — DISCHARGE INSTRUCTIONS
Visit Discharge/Physician Orders     Discharge condition: Stable     Assessment of pain at discharge: MILD     Anesthetic used: 4% LIDOCAINE     Discharge to: Home     Left via:Private automobile     Accompanied by: accompanied by mother     ECF/HHA:  [facility] - PLEASE FAX NEW ORDER     Dressing Orders: To all wounds- cleanse with saline, apply PLAIN calcium alginate and dry dressing. Change daily and as needed.     Home care to change once weekly. - PLEASE ONLY USE PLAIN CALCIUM ALGINATE      May pad wounds with foam for extra offloading      Begin to wear spandigrips bilaterally during the day, may remove at night.      Treatment Orders: Follow a nutritious diet. Choose foods high in protein: chicken, fish, and eggs. Choose foods high in Vitamin C. Multivitamin daily unless contraindicated. Offload back as often as possible. Try to change position every 2 hours. Offload feet with eggcrate cushions.     Increase protein in diet to aid in healing process- ISOPURE, Ensure                 [facility] followup visit _______2 weeks ___________________  (Please note your next appointment above and if you are unable to keep, kindly give a 24 hour notice. Thank you.)     Physician signature:__________________________        If you experience any of the following, please call the Wound Care Center during business hours:     * Increase in Pain  * Temperature over 101  * Increase in drainage from your wound  * Drainage with a foul odor  * Bleeding  * Increase in swelling  * Need for compression bandage changes due to slippage, breakthrough drainage.     If you need medical attention outside of the business hours of the Wound Care Centers please contact your PCP or go to the nearest emergency room.

## 2024-05-03 ENCOUNTER — HOSPITAL ENCOUNTER (OUTPATIENT)
Dept: WOUND CARE | Age: 49
Discharge: HOME | End: 2024-05-03
Attending: EMERGENCY MEDICINE | Admitting: SURGERY
Payer: MEDICARE

## 2024-05-03 VITALS
HEART RATE: 80 BPM | DIASTOLIC BLOOD PRESSURE: 80 MMHG | TEMPERATURE: 97 F | RESPIRATION RATE: 16 BRPM | SYSTOLIC BLOOD PRESSURE: 116 MMHG

## 2024-05-03 DIAGNOSIS — Q05.9 DECUBITUS ULCER DUE TO SPINA BIFIDA (HCC): Primary | ICD-10-CM

## 2024-05-03 DIAGNOSIS — G82.20 PARAPLEGIA (HCC): ICD-10-CM

## 2024-05-03 DIAGNOSIS — L97.522 ULCER OF FOOT, LEFT, WITH FAT LAYER EXPOSED (HCC): ICD-10-CM

## 2024-05-03 DIAGNOSIS — L97.512 ULCER OF FOOT, RIGHT, WITH FAT LAYER EXPOSED (HCC): ICD-10-CM

## 2024-05-03 DIAGNOSIS — L89.313 DECUBITUS ULCER OF ISCHIAL AREA, RIGHT, STAGE III (HCC): ICD-10-CM

## 2024-05-03 DIAGNOSIS — L89.90 DECUBITUS ULCER DUE TO SPINA BIFIDA (HCC): Primary | ICD-10-CM

## 2024-05-03 DIAGNOSIS — L89.103 DECUBITUS ULCER OF BACK, STAGE 3 (HCC): ICD-10-CM

## 2024-05-03 PROCEDURE — 11042 DBRDMT SUBQ TIS 1ST 20SQCM/<: CPT

## 2024-05-03 PROCEDURE — 11045 DBRDMT SUBQ TISS EACH ADDL: CPT

## 2024-05-03 RX ORDER — GENTAMICIN SULFATE 1 MG/G
OINTMENT TOPICAL ONCE
OUTPATIENT
Start: 2024-05-03 | End: 2024-05-03

## 2024-05-03 RX ORDER — LIDOCAINE HYDROCHLORIDE 40 MG/ML
SOLUTION TOPICAL ONCE
Status: COMPLETED | OUTPATIENT
Start: 2024-05-03 | End: 2024-05-03

## 2024-05-03 RX ORDER — IBUPROFEN 200 MG
TABLET ORAL ONCE
OUTPATIENT
Start: 2024-05-03 | End: 2024-05-03

## 2024-05-03 RX ORDER — GINSENG 100 MG
CAPSULE ORAL ONCE
OUTPATIENT
Start: 2024-05-03 | End: 2024-05-03

## 2024-05-03 RX ORDER — LIDOCAINE HYDROCHLORIDE 20 MG/ML
JELLY TOPICAL ONCE
OUTPATIENT
Start: 2024-05-03 | End: 2024-05-03

## 2024-05-03 RX ORDER — SODIUM CHLOR/HYPOCHLOROUS ACID 0.033 %
SOLUTION, IRRIGATION IRRIGATION ONCE
OUTPATIENT
Start: 2024-05-03 | End: 2024-05-03

## 2024-05-03 RX ORDER — LIDOCAINE HYDROCHLORIDE 40 MG/ML
SOLUTION TOPICAL ONCE
OUTPATIENT
Start: 2024-05-03 | End: 2024-05-03

## 2024-05-03 RX ORDER — BACITRACIN ZINC AND POLYMYXIN B SULFATE 500; 1000 [USP'U]/G; [USP'U]/G
OINTMENT TOPICAL ONCE
OUTPATIENT
Start: 2024-05-03 | End: 2024-05-03

## 2024-05-03 RX ORDER — CLOBETASOL PROPIONATE 0.5 MG/G
OINTMENT TOPICAL ONCE
OUTPATIENT
Start: 2024-05-03 | End: 2024-05-03

## 2024-05-03 RX ORDER — BETAMETHASONE DIPROPIONATE 0.5 MG/G
CREAM TOPICAL ONCE
OUTPATIENT
Start: 2024-05-03 | End: 2024-05-03

## 2024-05-03 RX ORDER — LIDOCAINE 40 MG/G
CREAM TOPICAL ONCE
OUTPATIENT
Start: 2024-05-03 | End: 2024-05-03

## 2024-05-03 RX ORDER — LIDOCAINE 50 MG/G
OINTMENT TOPICAL ONCE
OUTPATIENT
Start: 2024-05-03 | End: 2024-05-03

## 2024-05-03 RX ORDER — TRIAMCINOLONE ACETONIDE 1 MG/G
OINTMENT TOPICAL ONCE
OUTPATIENT
Start: 2024-05-03 | End: 2024-05-03

## 2024-05-03 RX ADMIN — LIDOCAINE HYDROCHLORIDE 4 ML: 40 SOLUTION TOPICAL at 14:33

## 2024-05-03 NOTE — PROGRESS NOTES
extremity wounds:    Grade Description   []  0 No open wound   []  1 Superficial ulcer involving the full skin thickness   []  2 Deep ulcer involves ligament, tendon, joint capsule, or fascia  No bone involvement or abscess presence   []  3 Deep Ulcer with abcess formation and/or osteomyelitis   []  4 Localized gangrene   []  5 Extensive gangrene of the foot     Wound: Patient does have multiple pressure ulcers, at the junction of the lumbar and thoracic spine, both feet, over the plantar surface and also large ulcer, over the right ischial area    Other pertinent information:    1.  Discussed the patient and the nursing staff, ability to obtain record from [place] wound care center    7/7/2023    Discussed the patient and patient mother Zoraida, in the room, provided offloading, sleeping on the sides only, in the abdomen, and also to avoid prolonged sitting in the chair also discussed regarding improved nutrition, multivitamin, protein supplements  We will also order x-ray of both feet, but lumbar spine to rule out possibility of an underlying osteomyelitis, along with lab work, CBC and CMP and also an ankle-brachial index but currently diminished pulses in the feet  All the questions were answered      7/21/23  Wounds overall measuring slightly larger   Bloodwork results overall unremarkable - Slightly decreased creatinine (0.6)  Wounds debrided   Cleocin 300mg 3x/day prescribed   Cleanse all wounds with Dakin's or Vashe  Dakin's (or Vashe) packing to right ischium   Plain aquacel to all other wounds     8/4/23  Wounds overall measuring smaller  Right foot wound malodorous drainage present   Wounds debrided   Culture obtained   Cleanse all wounds with Dakin's or Vashe  Dakin's (or Vashe) packing to right foot   Plain aquacel to all other wounds   Patient had CT in [place] - patient states he was told he had osteomyelitis   Will obtain CT results and review   Discussed possible referral to ID     8/25/23  Wounds

## 2024-05-17 ENCOUNTER — HOSPITAL ENCOUNTER (OUTPATIENT)
Dept: WOUND CARE | Age: 49
Discharge: HOME | End: 2024-05-17
Attending: EMERGENCY MEDICINE | Admitting: SURGERY
Payer: MEDICARE

## 2024-05-17 VITALS
HEIGHT: 63 IN | WEIGHT: 230 LBS | DIASTOLIC BLOOD PRESSURE: 78 MMHG | SYSTOLIC BLOOD PRESSURE: 118 MMHG | HEART RATE: 82 BPM | TEMPERATURE: 98.6 F | BODY MASS INDEX: 40.75 KG/M2 | RESPIRATION RATE: 20 BRPM

## 2024-05-17 DIAGNOSIS — L89.313 DECUBITUS ULCER OF ISCHIAL AREA, RIGHT, STAGE III (HCC): ICD-10-CM

## 2024-05-17 DIAGNOSIS — L97.512 ULCER OF FOOT, RIGHT, WITH FAT LAYER EXPOSED (HCC): ICD-10-CM

## 2024-05-17 DIAGNOSIS — L97.522 ULCER OF FOOT, LEFT, WITH FAT LAYER EXPOSED (HCC): ICD-10-CM

## 2024-05-17 DIAGNOSIS — L89.90 DECUBITUS ULCER DUE TO SPINA BIFIDA (HCC): Primary | ICD-10-CM

## 2024-05-17 DIAGNOSIS — L89.103 DECUBITUS ULCER OF BACK, STAGE 3 (HCC): ICD-10-CM

## 2024-05-17 DIAGNOSIS — Q05.9 DECUBITUS ULCER DUE TO SPINA BIFIDA (HCC): Primary | ICD-10-CM

## 2024-05-17 PROCEDURE — 11042 DBRDMT SUBQ TIS 1ST 20SQCM/<: CPT

## 2024-05-17 PROCEDURE — 11045 DBRDMT SUBQ TISS EACH ADDL: CPT

## 2024-05-17 RX ORDER — LIDOCAINE HYDROCHLORIDE 20 MG/ML
JELLY TOPICAL ONCE
OUTPATIENT
Start: 2024-05-17 | End: 2024-05-17

## 2024-05-17 RX ORDER — BETAMETHASONE DIPROPIONATE 0.5 MG/G
CREAM TOPICAL ONCE
OUTPATIENT
Start: 2024-05-17 | End: 2024-05-17

## 2024-05-17 RX ORDER — LIDOCAINE HYDROCHLORIDE 40 MG/ML
SOLUTION TOPICAL ONCE
OUTPATIENT
Start: 2024-05-17 | End: 2024-05-17

## 2024-05-17 RX ORDER — CLOBETASOL PROPIONATE 0.5 MG/G
OINTMENT TOPICAL ONCE
OUTPATIENT
Start: 2024-05-17 | End: 2024-05-17

## 2024-05-17 RX ORDER — LIDOCAINE 50 MG/G
OINTMENT TOPICAL ONCE
OUTPATIENT
Start: 2024-05-17 | End: 2024-05-17

## 2024-05-17 RX ORDER — IBUPROFEN 200 MG
TABLET ORAL ONCE
OUTPATIENT
Start: 2024-05-17 | End: 2024-05-17

## 2024-05-17 RX ORDER — SODIUM CHLOR/HYPOCHLOROUS ACID 0.033 %
SOLUTION, IRRIGATION IRRIGATION ONCE
OUTPATIENT
Start: 2024-05-17 | End: 2024-05-17

## 2024-05-17 RX ORDER — GINSENG 100 MG
CAPSULE ORAL ONCE
OUTPATIENT
Start: 2024-05-17 | End: 2024-05-17

## 2024-05-17 RX ORDER — LIDOCAINE 40 MG/G
CREAM TOPICAL ONCE
OUTPATIENT
Start: 2024-05-17 | End: 2024-05-17

## 2024-05-17 RX ORDER — GENTAMICIN SULFATE 1 MG/G
OINTMENT TOPICAL ONCE
OUTPATIENT
Start: 2024-05-17 | End: 2024-05-17

## 2024-05-17 RX ORDER — TRIAMCINOLONE ACETONIDE 1 MG/G
OINTMENT TOPICAL ONCE
OUTPATIENT
Start: 2024-05-17 | End: 2024-05-17

## 2024-05-17 RX ORDER — BACITRACIN ZINC AND POLYMYXIN B SULFATE 500; 1000 [USP'U]/G; [USP'U]/G
OINTMENT TOPICAL ONCE
OUTPATIENT
Start: 2024-05-17 | End: 2024-05-17

## 2024-05-17 RX ORDER — LIDOCAINE HYDROCHLORIDE 40 MG/ML
SOLUTION TOPICAL ONCE
Status: COMPLETED | OUTPATIENT
Start: 2024-05-17 | End: 2024-05-17

## 2024-05-17 RX ADMIN — LIDOCAINE HYDROCHLORIDE 10 ML: 40 SOLUTION TOPICAL at 14:21

## 2024-05-17 NOTE — DISCHARGE INSTRUCTIONS
Visit Discharge/Physician Orders     Discharge condition: Stable     Assessment of pain at discharge: MILD     Anesthetic used: 4% LIDOCAINE     Discharge to: Home     Left via:Private automobile     Accompanied by: accompanied by mother     ECF/HHA:  [facility] - PLEASE FAX NEW ORDER     Dressing Orders: To all wounds- cleanse with saline, apply PLAIN calcium alginate and dry dressing. Change daily and as needed.     Home care to change once weekly. - PLEASE ONLY USE PLAIN CALCIUM ALGINATE      May pad wounds with foam for extra offloading      Begin to wear spandigrips bilaterally during the day, may remove at night.      Treatment Orders: Follow a nutritious diet. Choose foods high in protein: chicken, fish, and eggs. Choose foods high in Vitamin C. Multivitamin daily unless contraindicated. Offload back as often as possible. Try to change position every 2 hours. Offload feet with eggcrate cushions.     Increase protein in diet to aid in healing process- ISOPURE, Ensure                 [facility] followup visit _______2 weeks ___________________  (Please note your next appointment above and if you are unable to keep, kindly give a 24 hour notice. Thank you.)     Physician signature:__________________________        If you experience any of the following, please call the Wound Care Center during business hours:     * Increase in Pain  * Temperature over 101  * Increase in drainage from your wound  * Drainage with a foul odor  * Bleeding  * Increase in swelling  * Need for compression bandage changes due to slippage, breakthrough drainage.     If you need medical attention outside of the business hours of the Wound Care Centers please contact your PCP or go to the nearest emergency room.

## 2024-05-17 NOTE — PROGRESS NOTES
Wound Healing Center /Hyperbarics   History and Physical/Consultation  Vascular    Referring Physician : Massimo Yoon MD  Thai Church  MEDICAL RECORD NUMBER:  [id number]  AGE: 48 y.o.   GENDER: male  : 1975  EPISODE DATE:  5/3/2024  Subjective:     Chief Complaint   Patient presents with    Wound Check     Wound ischial         HISTORY of PRESENT ILLNESS HPI     Thai Church is a 48 y.o. male who presents today for wound/ulcer evaluation.   History of Wound Context:  The patient has had a wounds of both feet, right ischium, back pressure ulcers between the junction of the lumbar spine and thoracic spine which was first noted approximately at least 3 to 4 months, patient and his mother tell me that they noticed that when the patient got home from a extended care facility but they do not recall how long he had ulcers,.  This has been treated at the [place] wound care center and family came to [facility], and in the past he was treated here with good results.  On their initial visit to the wound healing center, 23, the patient has noted that the wound has not been improving.  The patient has had similar previous wounds in the past.        Patient has spina bifida with hydrocephalus, has undergone previous surgeries multiple including peritoneal venous shunt for hydrocephalus in the [place], has paraplegia, does not ambulate, recently underwent insertion of suprapubic catheter    Pt is currently not on abx.      Wound/Ulcer Pain Timing/Severity: constant  Quality of pain: dull, aching  Severity:  3 / 10   Modifying Factors: None  Associated Signs/Symptoms: drainage and pain    Ulcer Identification:  Ulcer Type: pressure and non-healing/non-surgical  Contributing Factors: lymphedema, chronic pressure, decreased mobility, and shear force    Diabetic/Pressure/Non Pressure Ulcers only:  Ulcer: N/A    If patient has diabetic lower extremity wounds  Zapata Classification of diabetic lower

## 2024-05-29 NOTE — DISCHARGE INSTRUCTIONS
Visit Discharge/Physician Orders     Discharge condition: Stable     Assessment of pain at discharge: MILD     Anesthetic used: 4% LIDOCAINE     Discharge to: Home     Left via:Private automobile     Accompanied by: accompanied by mother     ECF/HHA:  [facility] - PLEASE FAX NEW ORDER     Dressing Orders: To all wounds- cleanse with saline, apply PLAIN calcium alginate and dry dressing. Change daily and as needed.     Home care to change once weekly. - PLEASE ONLY USE PLAIN CALCIUM ALGINATE      May pad wounds with foam for extra offloading      Begin to wear spandigrips bilaterally during the day, may remove at night.      Treatment Orders: Follow a nutritious diet. Choose foods high in protein: chicken, fish, and eggs. Choose foods high in Vitamin C. Multivitamin daily unless contraindicated. Offload back as often as possible. Try to change position every 2 hours. Offload feet with eggcrate cushions.     Increase protein in diet to aid in healing process- ISOPURE, Ensure                 [facility] followup visit _______2 weeks ___________________  (Please note your next appointment above and if you are unable to keep, kindly give a 24 hour notice. Thank you.)     Physician signature:__________________________        If you experience any of the following, please call the Wound Care Center during business hours:     * Increase in Pain  * Temperature over 101  * Increase in drainage from your wound  * Drainage with a foul odor  * Bleeding  * Increase in swelling  * Need for compression bandage changes due to slippage, breakthrough drainage.     If you need medical attention outside of the business hours of the Wound Care Centers please contact your PCP or go to the nearest emergency room.

## 2024-05-31 ENCOUNTER — HOSPITAL ENCOUNTER (OUTPATIENT)
Dept: WOUND CARE | Age: 49
Discharge: HOME | End: 2024-05-31
Attending: EMERGENCY MEDICINE | Admitting: SURGERY
Payer: MEDICARE

## 2024-05-31 VITALS
HEART RATE: 88 BPM | TEMPERATURE: 97.4 F | DIASTOLIC BLOOD PRESSURE: 79 MMHG | RESPIRATION RATE: 1 BRPM | SYSTOLIC BLOOD PRESSURE: 145 MMHG

## 2024-05-31 DIAGNOSIS — Q05.4 SPINA BIFIDA WITH HYDROCEPHALUS, UNSPECIFIED SPINAL REGION (HCC): ICD-10-CM

## 2024-05-31 DIAGNOSIS — L89.103 DECUBITUS ULCER OF BACK, STAGE 3 (HCC): ICD-10-CM

## 2024-05-31 DIAGNOSIS — L89.313 DECUBITUS ULCER OF ISCHIAL AREA, RIGHT, STAGE III (HCC): ICD-10-CM

## 2024-05-31 DIAGNOSIS — L97.522 ULCER OF FOOT, LEFT, WITH FAT LAYER EXPOSED (HCC): ICD-10-CM

## 2024-05-31 DIAGNOSIS — Q05.9 DECUBITUS ULCER DUE TO SPINA BIFIDA (HCC): Primary | ICD-10-CM

## 2024-05-31 DIAGNOSIS — L97.512 ULCER OF FOOT, RIGHT, WITH FAT LAYER EXPOSED (HCC): ICD-10-CM

## 2024-05-31 DIAGNOSIS — I89.0 LYMPHEDEMA OF BOTH LOWER EXTREMITIES: ICD-10-CM

## 2024-05-31 DIAGNOSIS — R09.89 DECREASED DORSALIS PEDIS PULSE: ICD-10-CM

## 2024-05-31 DIAGNOSIS — L89.90 DECUBITUS ULCER DUE TO SPINA BIFIDA (HCC): Primary | ICD-10-CM

## 2024-05-31 DIAGNOSIS — G82.20 PARAPLEGIA (HCC): ICD-10-CM

## 2024-05-31 PROCEDURE — 11042 DBRDMT SUBQ TIS 1ST 20SQCM/<: CPT | Performed by: NURSE PRACTITIONER

## 2024-05-31 PROCEDURE — 11045 DBRDMT SUBQ TISS EACH ADDL: CPT

## 2024-05-31 PROCEDURE — 11045 DBRDMT SUBQ TISS EACH ADDL: CPT | Performed by: NURSE PRACTITIONER

## 2024-05-31 PROCEDURE — 11042 DBRDMT SUBQ TIS 1ST 20SQCM/<: CPT

## 2024-05-31 RX ORDER — SODIUM CHLOR/HYPOCHLOROUS ACID 0.033 %
SOLUTION, IRRIGATION IRRIGATION ONCE
OUTPATIENT
Start: 2024-05-31 | End: 2024-05-31

## 2024-05-31 RX ORDER — LIDOCAINE 50 MG/G
OINTMENT TOPICAL ONCE
OUTPATIENT
Start: 2024-05-31 | End: 2024-05-31

## 2024-05-31 RX ORDER — BACITRACIN ZINC AND POLYMYXIN B SULFATE 500; 1000 [USP'U]/G; [USP'U]/G
OINTMENT TOPICAL ONCE
OUTPATIENT
Start: 2024-05-31 | End: 2024-05-31

## 2024-05-31 RX ORDER — TRIAMCINOLONE ACETONIDE 1 MG/G
OINTMENT TOPICAL ONCE
OUTPATIENT
Start: 2024-05-31 | End: 2024-05-31

## 2024-05-31 RX ORDER — BETAMETHASONE DIPROPIONATE 0.5 MG/G
CREAM TOPICAL ONCE
OUTPATIENT
Start: 2024-05-31 | End: 2024-05-31

## 2024-05-31 RX ORDER — CLOBETASOL PROPIONATE 0.5 MG/G
OINTMENT TOPICAL ONCE
OUTPATIENT
Start: 2024-05-31 | End: 2024-05-31

## 2024-05-31 RX ORDER — LIDOCAINE HYDROCHLORIDE 20 MG/ML
JELLY TOPICAL ONCE
OUTPATIENT
Start: 2024-05-31 | End: 2024-05-31

## 2024-05-31 RX ORDER — GINSENG 100 MG
CAPSULE ORAL ONCE
OUTPATIENT
Start: 2024-05-31 | End: 2024-05-31

## 2024-05-31 RX ORDER — LIDOCAINE HYDROCHLORIDE 40 MG/ML
SOLUTION TOPICAL ONCE
OUTPATIENT
Start: 2024-05-31 | End: 2024-05-31

## 2024-05-31 RX ORDER — LIDOCAINE HYDROCHLORIDE 40 MG/ML
SOLUTION TOPICAL ONCE
Status: COMPLETED | OUTPATIENT
Start: 2024-05-31 | End: 2024-05-31

## 2024-05-31 RX ORDER — GENTAMICIN SULFATE 1 MG/G
OINTMENT TOPICAL ONCE
OUTPATIENT
Start: 2024-05-31 | End: 2024-05-31

## 2024-05-31 RX ORDER — LIDOCAINE 40 MG/G
CREAM TOPICAL ONCE
OUTPATIENT
Start: 2024-05-31 | End: 2024-05-31

## 2024-05-31 RX ORDER — IBUPROFEN 200 MG
TABLET ORAL ONCE
OUTPATIENT
Start: 2024-05-31 | End: 2024-05-31

## 2024-05-31 RX ADMIN — LIDOCAINE HYDROCHLORIDE: 40 SOLUTION TOPICAL at 13:20

## 2024-05-31 NOTE — PROGRESS NOTES
Wound Healing Center /Hyperbarics   History and Physical/Consultation  Vascular    Referring Physician : Massimo Yoon MD  Thai Church  MEDICAL RECORD NUMBER:  [id number]  AGE: 48 y.o.   GENDER: male  : 1975  EPISODE DATE:  2024  Subjective:     Chief Complaint   Patient presents with    Wound Check         HISTORY of PRESENT ILLNESS HPI     Thai Church is a 48 y.o. male who presents today for wound/ulcer evaluation.   History of Wound Context:  The patient has had a wounds of both feet, right ischium, back pressure ulcers between the junction of the lumbar spine and thoracic spine which was first noted approximately at least 3 to 4 months, patient and his mother tell me that they noticed that when the patient got home from a extended care facility but they do not recall how long he had ulcers,.  This has been treated at the [place] wound care center and family came to [facility], and in the past he was treated here with good results.  On their initial visit to the wound healing center, 23, the patient has noted that the wound has not been improving.  The patient has had similar previous wounds in the past.        Patient has spina bifida with hydrocephalus, has undergone previous surgeries multiple including peritoneal venous shunt for hydrocephalus in the [place], has paraplegia, does not ambulate, recently underwent insertion of suprapubic catheter    Pt is currently not on abx.      Wound/Ulcer Pain Timing/Severity: constant  Quality of pain: dull, aching  Severity:  3 / 10   Modifying Factors: None  Associated Signs/Symptoms: drainage and pain    Ulcer Identification:  Ulcer Type: pressure and non-healing/non-surgical  Contributing Factors: lymphedema, chronic pressure, decreased mobility, and shear force    Diabetic/Pressure/Non Pressure Ulcers only:  Ulcer: N/A    If patient has diabetic lower extremity wounds  Zapata Classification of diabetic lower extremity

## 2024-06-13 NOTE — DISCHARGE INSTRUCTIONS
Visit Discharge/Physician Orders     Discharge condition: Stable     Assessment of pain at discharge: MILD     Anesthetic used: 4% LIDOCAINE     Discharge to: Home     Left via:Private automobile     Accompanied by: accompanied by mother     ECF/HHA:  [facility] - PLEASE FAX NEW ORDER     Dressing Orders: To all wounds- cleanse with saline, apply PLAIN calcium alginate and dry dressing. Change daily and as needed.     Home care to change once weekly. - PLEASE ONLY USE PLAIN CALCIUM ALGINATE      May pad wounds with foam for extra offloading      Begin to wear spandigrips bilaterally during the day, may remove at night.      Treatment Orders: Follow a nutritious diet. Choose foods high in protein: chicken, fish, and eggs. Choose foods high in Vitamin C. Multivitamin daily unless contraindicated. Offload back as often as possible. Try to change position every 2 hours. Offload feet with eggcrate cushions.     Increase protein in diet to aid in healing process- ISOPURE, Ensure                 [facility] followup visit _______2 weeks ___________________  (Please note your next appointment above and if you are unable to keep, kindly give a 24 hour notice. Thank you.)     Physician signature:__________________________        If you experience any of the following, please call the Wound Care Center during business hours:     * Increase in Pain  * Temperature over 101  * Increase in drainage from your wound  * Drainage with a foul odor  * Bleeding  * Increase in swelling  * Need for compression bandage changes due to slippage, breakthrough drainage.     If you need medical attention outside of the business hours of the Wound Care Centers please contact your PCP or go to the nearest emergency room.

## 2024-06-14 ENCOUNTER — HOSPITAL ENCOUNTER (OUTPATIENT)
Dept: WOUND CARE | Age: 49
Discharge: HOME | End: 2024-06-14
Attending: EMERGENCY MEDICINE | Admitting: SURGERY
Payer: MEDICARE

## 2024-06-14 VITALS
RESPIRATION RATE: 16 BRPM | HEART RATE: 100 BPM | DIASTOLIC BLOOD PRESSURE: 82 MMHG | SYSTOLIC BLOOD PRESSURE: 146 MMHG | TEMPERATURE: 96.4 F

## 2024-06-14 DIAGNOSIS — L97.512 ULCER OF FOOT, RIGHT, WITH FAT LAYER EXPOSED (HCC): ICD-10-CM

## 2024-06-14 DIAGNOSIS — L89.90 DECUBITUS ULCER DUE TO SPINA BIFIDA (HCC): Primary | ICD-10-CM

## 2024-06-14 DIAGNOSIS — L89.313 DECUBITUS ULCER OF ISCHIAL AREA, RIGHT, STAGE III (HCC): ICD-10-CM

## 2024-06-14 DIAGNOSIS — L97.522 ULCER OF FOOT, LEFT, WITH FAT LAYER EXPOSED (HCC): ICD-10-CM

## 2024-06-14 DIAGNOSIS — Q05.9 DECUBITUS ULCER DUE TO SPINA BIFIDA (HCC): Primary | ICD-10-CM

## 2024-06-14 DIAGNOSIS — L89.103 DECUBITUS ULCER OF BACK, STAGE 3 (HCC): ICD-10-CM

## 2024-06-14 PROCEDURE — 11045 DBRDMT SUBQ TISS EACH ADDL: CPT

## 2024-06-14 PROCEDURE — 11042 DBRDMT SUBQ TIS 1ST 20SQCM/<: CPT

## 2024-06-14 RX ORDER — LIDOCAINE HYDROCHLORIDE 20 MG/ML
JELLY TOPICAL ONCE
OUTPATIENT
Start: 2024-06-14 | End: 2024-06-14

## 2024-06-14 RX ORDER — TRIAMCINOLONE ACETONIDE 1 MG/G
OINTMENT TOPICAL ONCE
OUTPATIENT
Start: 2024-06-14 | End: 2024-06-14

## 2024-06-14 RX ORDER — LIDOCAINE HYDROCHLORIDE 40 MG/ML
SOLUTION TOPICAL ONCE
OUTPATIENT
Start: 2024-06-14 | End: 2024-06-14

## 2024-06-14 RX ORDER — CLOBETASOL PROPIONATE 0.5 MG/G
OINTMENT TOPICAL ONCE
OUTPATIENT
Start: 2024-06-14 | End: 2024-06-14

## 2024-06-14 RX ORDER — LIDOCAINE 40 MG/G
CREAM TOPICAL ONCE
OUTPATIENT
Start: 2024-06-14 | End: 2024-06-14

## 2024-06-14 RX ORDER — LIDOCAINE 50 MG/G
OINTMENT TOPICAL ONCE
OUTPATIENT
Start: 2024-06-14 | End: 2024-06-14

## 2024-06-14 RX ORDER — GENTAMICIN SULFATE 1 MG/G
OINTMENT TOPICAL ONCE
OUTPATIENT
Start: 2024-06-14 | End: 2024-06-14

## 2024-06-14 RX ORDER — IBUPROFEN 200 MG
TABLET ORAL ONCE
OUTPATIENT
Start: 2024-06-14 | End: 2024-06-14

## 2024-06-14 RX ORDER — BETAMETHASONE DIPROPIONATE 0.5 MG/G
CREAM TOPICAL ONCE
OUTPATIENT
Start: 2024-06-14 | End: 2024-06-14

## 2024-06-14 RX ORDER — BACITRACIN ZINC AND POLYMYXIN B SULFATE 500; 1000 [USP'U]/G; [USP'U]/G
OINTMENT TOPICAL ONCE
OUTPATIENT
Start: 2024-06-14 | End: 2024-06-14

## 2024-06-14 RX ORDER — SODIUM CHLOR/HYPOCHLOROUS ACID 0.033 %
SOLUTION, IRRIGATION IRRIGATION ONCE
OUTPATIENT
Start: 2024-06-14 | End: 2024-06-14

## 2024-06-14 RX ORDER — GINSENG 100 MG
CAPSULE ORAL ONCE
OUTPATIENT
Start: 2024-06-14 | End: 2024-06-14

## 2024-06-14 NOTE — PLAN OF CARE
Problem: Wound:  Goal: Will show signs of wound healing; wound closure and no evidence of infection  Description: Will show signs of wound healing; wound closure and no evidence of infection  Outcome: Progressing     Problem: Pressure Ulcer:  Goal: Signs of wound healing will improve  Description: Signs of wound healing will improve  Outcome: Progressing  Goal: Will show no infection signs and symptoms  Description: Will show no infection signs and symptoms  Outcome: Progressing

## 2024-06-14 NOTE — PROGRESS NOTES
Begin to wear spandigrips bilaterally during the day, may remove at night.      Treatment Orders: Follow a nutritious diet. Choose foods high in protein: chicken, fish, and eggs. Choose foods high in Vitamin C. Multivitamin daily unless contraindicated. Offload back as often as possible. Try to change position every 2 hours. Offload feet with eggcrate cushions.     Increase protein in diet to aid in healing process- ISOPURE, Ensure                 [facility] followup visit _______2 weeks ___________________  (Please note your next appointment above and if you are unable to keep, kindly give a 24 hour notice. Thank you.)     Physician signature:__________________________        If you experience any of the following, please call the Wound Care Center during business hours:     * Increase in Pain  * Temperature over 101  * Increase in drainage from your wound  * Drainage with a foul odor  * Bleeding  * Increase in swelling  * Need for compression bandage changes due to slippage, breakthrough drainage.     If you need medical attention outside of the business hours of the Wound Care Centers please contact your PCP or go to the nearest emergency room.                 Electronically signed by LENNY Lal CNP on 5/31/2024 at 4:41 PM

## 2024-06-27 NOTE — DISCHARGE INSTRUCTIONS
Visit Discharge/Physician Orders     Discharge condition: Stable     Assessment of pain at discharge: MILD     Anesthetic used: 4% LIDOCAINE     Discharge to: Home     Left via:Private automobile     Accompanied by: accompanied by mother     ECF/HHA:  [facility] - PLEASE FAX NEW ORDER     Dressing Orders: To right knee, left heel, left plantar foot, and back wounds- cleanse with saline, apply PLAIN calcium alginate and dry dressing. Change daily and as needed.     Home care to change once weekly. - PLEASE ONLY USE PLAIN CALCIUM ALGINATE      May pad wounds with foam for extra offloading      Begin to wear spandigrips bilaterally during the day, may remove at night.      Treatment Orders: Follow a nutritious diet. Choose foods high in protein: chicken, fish, and eggs. Choose foods high in Vitamin C. Multivitamin daily unless contraindicated. Offload back as often as possible. Try to change position every 2 hours. Offload feet with eggcrate cushions.     Increase protein in diet to aid in healing process- ISOPURE, Ensure    C&S taken 6/28/24     [facility] followup visit _______2 weeks ___________________  (Please note your next appointment above and if you are unable to keep, kindly give a 24 hour notice. Thank you.)     Physician signature:__________________________        If you experience any of the following, please call the Wound Care Center during business hours:     * Increase in Pain  * Temperature over 101  * Increase in drainage from your wound  * Drainage with a foul odor  * Bleeding  * Increase in swelling  * Need for compression bandage changes due to slippage, breakthrough drainage.     If you need medical attention outside of the business hours of the Wound Care Centers please contact your PCP or go to the nearest emergency room.

## 2024-06-28 ENCOUNTER — HOSPITAL ENCOUNTER (OUTPATIENT)
Dept: WOUND CARE | Age: 49
Discharge: HOME | End: 2024-06-28
Attending: EMERGENCY MEDICINE | Admitting: SURGERY
Payer: MEDICARE

## 2024-06-28 VITALS
DIASTOLIC BLOOD PRESSURE: 80 MMHG | RESPIRATION RATE: 18 BRPM | HEART RATE: 74 BPM | SYSTOLIC BLOOD PRESSURE: 140 MMHG | TEMPERATURE: 98.7 F | WEIGHT: 230 LBS | BODY MASS INDEX: 40.75 KG/M2 | HEIGHT: 63 IN

## 2024-06-28 DIAGNOSIS — L89.103 DECUBITUS ULCER OF BACK, STAGE 3 (HCC): ICD-10-CM

## 2024-06-28 DIAGNOSIS — Q05.4 SPINA BIFIDA WITH HYDROCEPHALUS, UNSPECIFIED SPINAL REGION (HCC): ICD-10-CM

## 2024-06-28 DIAGNOSIS — I89.0 LYMPHEDEMA OF BOTH LOWER EXTREMITIES: ICD-10-CM

## 2024-06-28 DIAGNOSIS — L89.104 DECUBITUS ULCER OF BACK, STAGE 4 (HCC): ICD-10-CM

## 2024-06-28 DIAGNOSIS — L97.522 ULCER OF FOOT, LEFT, WITH FAT LAYER EXPOSED (HCC): ICD-10-CM

## 2024-06-28 DIAGNOSIS — Q05.9 DECUBITUS ULCER DUE TO SPINA BIFIDA (HCC): Primary | ICD-10-CM

## 2024-06-28 DIAGNOSIS — G82.20 PARAPLEGIA (HCC): ICD-10-CM

## 2024-06-28 DIAGNOSIS — L89.313 DECUBITUS ULCER OF ISCHIAL AREA, RIGHT, STAGE III (HCC): ICD-10-CM

## 2024-06-28 DIAGNOSIS — L89.90 DECUBITUS ULCER DUE TO SPINA BIFIDA (HCC): Primary | ICD-10-CM

## 2024-06-28 DIAGNOSIS — L97.512 ULCER OF FOOT, RIGHT, WITH FAT LAYER EXPOSED (HCC): ICD-10-CM

## 2024-06-28 PROCEDURE — 87205 SMEAR GRAM STAIN: CPT

## 2024-06-28 PROCEDURE — 11043 DBRDMT MUSC&/FSCA 1ST 20/<: CPT

## 2024-06-28 PROCEDURE — 87070 CULTURE OTHR SPECIMN AEROBIC: CPT

## 2024-06-28 PROCEDURE — 11046 DBRDMT MUSC&/FSCA EA ADDL: CPT

## 2024-06-28 RX ORDER — LIDOCAINE HYDROCHLORIDE 20 MG/ML
JELLY TOPICAL ONCE
OUTPATIENT
Start: 2024-06-28 | End: 2024-06-28

## 2024-06-28 RX ORDER — LIDOCAINE 40 MG/G
CREAM TOPICAL ONCE
OUTPATIENT
Start: 2024-06-28 | End: 2024-06-28

## 2024-06-28 RX ORDER — LIDOCAINE HYDROCHLORIDE 40 MG/ML
SOLUTION TOPICAL ONCE
OUTPATIENT
Start: 2024-06-28 | End: 2024-06-28

## 2024-06-28 RX ORDER — BACITRACIN ZINC AND POLYMYXIN B SULFATE 500; 1000 [USP'U]/G; [USP'U]/G
OINTMENT TOPICAL ONCE
OUTPATIENT
Start: 2024-06-28 | End: 2024-06-28

## 2024-06-28 RX ORDER — TRIAMCINOLONE ACETONIDE 1 MG/G
OINTMENT TOPICAL ONCE
OUTPATIENT
Start: 2024-06-28 | End: 2024-06-28

## 2024-06-28 RX ORDER — CLOBETASOL PROPIONATE 0.5 MG/G
OINTMENT TOPICAL ONCE
OUTPATIENT
Start: 2024-06-28 | End: 2024-06-28

## 2024-06-28 RX ORDER — IBUPROFEN 200 MG
TABLET ORAL ONCE
OUTPATIENT
Start: 2024-06-28 | End: 2024-06-28

## 2024-06-28 RX ORDER — GENTAMICIN SULFATE 1 MG/G
OINTMENT TOPICAL ONCE
OUTPATIENT
Start: 2024-06-28 | End: 2024-06-28

## 2024-06-28 RX ORDER — BETAMETHASONE DIPROPIONATE 0.5 MG/G
CREAM TOPICAL ONCE
OUTPATIENT
Start: 2024-06-28 | End: 2024-06-28

## 2024-06-28 RX ORDER — LIDOCAINE HYDROCHLORIDE 40 MG/ML
SOLUTION TOPICAL ONCE
Status: COMPLETED | OUTPATIENT
Start: 2024-06-28 | End: 2024-06-28

## 2024-06-28 RX ORDER — GINSENG 100 MG
CAPSULE ORAL ONCE
OUTPATIENT
Start: 2024-06-28 | End: 2024-06-28

## 2024-06-28 RX ORDER — SODIUM CHLOR/HYPOCHLOROUS ACID 0.033 %
SOLUTION, IRRIGATION IRRIGATION ONCE
OUTPATIENT
Start: 2024-06-28 | End: 2024-06-28

## 2024-06-28 RX ORDER — LIDOCAINE 50 MG/G
OINTMENT TOPICAL ONCE
OUTPATIENT
Start: 2024-06-28 | End: 2024-06-28

## 2024-06-28 RX ADMIN — LIDOCAINE HYDROCHLORIDE 10 ML: 40 SOLUTION TOPICAL at 13:46

## 2024-06-28 NOTE — PROGRESS NOTES
Wound Healing Center /Hyperbarics   History and Physical/Consultation  Vascular    Referring Physician : Massimo Yoon MD  Thai Church  MEDICAL RECORD NUMBER:  [id number]  AGE: 48 y.o.   GENDER: male  : 1975  EPISODE DATE:  2024  Subjective:     Chief Complaint   Patient presents with    Wound Check     \"Back, right knee, left foot\"         HISTORY of PRESENT ILLNESS HPI     Thai Church is a 48 y.o. male who presents today for wound/ulcer evaluation.   History of Wound Context:  The patient has had a wounds of both feet, right ischium, back pressure ulcers between the junction of the lumbar spine and thoracic spine which was first noted approximately at least 3 to 4 months, patient and his mother tell me that they noticed that when the patient got home from a extended care facility but they do not recall how long he had ulcers,.  This has been treated at the [place] wound care center and family came to [facility], and in the past he was treated here with good results.  On their initial visit to the wound healing center, 23, the patient has noted that the wound has not been improving.  The patient has had similar previous wounds in the past.        Patient has spina bifida with hydrocephalus, has undergone previous surgeries multiple including peritoneal venous shunt for hydrocephalus in the [place], has paraplegia, does not ambulate, recently underwent insertion of suprapubic catheter    Pt is currently not on abx.      Wound/Ulcer Pain Timing/Severity: constant  Quality of pain: dull, aching  Severity:  3 / 10   Modifying Factors: None  Associated Signs/Symptoms: drainage and pain    Ulcer Identification:  Ulcer Type: pressure and non-healing/non-surgical  Contributing Factors: lymphedema, chronic pressure, decreased mobility, and shear force    Diabetic/Pressure/Non Pressure Ulcers only:  Ulcer: N/A    If patient has diabetic lower extremity wounds  Zapata Classification

## 2024-07-03 ENCOUNTER — TELEPHONE (OUTPATIENT)
Dept: WOUND CARE | Age: 49
End: 2024-07-03

## 2024-07-03 DIAGNOSIS — Q05.9 DECUBITUS ULCER DUE TO SPINA BIFIDA (HCC): Primary | ICD-10-CM

## 2024-07-03 DIAGNOSIS — L97.512 ULCER OF FOOT, RIGHT, WITH FAT LAYER EXPOSED (HCC): ICD-10-CM

## 2024-07-03 DIAGNOSIS — L97.522 ULCER OF FOOT, LEFT, WITH FAT LAYER EXPOSED (HCC): ICD-10-CM

## 2024-07-03 DIAGNOSIS — L89.313 DECUBITUS ULCER OF ISCHIAL AREA, RIGHT, STAGE III (HCC): ICD-10-CM

## 2024-07-03 DIAGNOSIS — L89.90 DECUBITUS ULCER DUE TO SPINA BIFIDA (HCC): Primary | ICD-10-CM

## 2024-07-03 DIAGNOSIS — L89.103 DECUBITUS ULCER OF BACK, STAGE 3 (HCC): ICD-10-CM

## 2024-07-03 NOTE — TELEPHONE ENCOUNTER
Called patient to update of antibiotic sent to pharmacy, call went unanswered and voicemail was left.

## 2024-07-08 NOTE — DISCHARGE INSTRUCTIONS
Visit Discharge/Physician Orders     Discharge condition: Stable     Assessment of pain at discharge: MILD     Anesthetic used: 4% LIDOCAINE     Discharge to: Home     Left via:Private automobile     Accompanied by: accompanied by mother     ECF/HHA:  [facility] - PLEASE FAX NEW ORDER     Dressing Orders: TO LEFT HEEL AND BACK CLEAN WITH SALINE APPLY AQUACEL AG DRY DRESSING CHANGE DAILY   TO BACK APPLY SANTYL, DRY DRESSING, CHANGE DAILY      May pad wounds with foam for extra offloading      Begin to wear spandigrips bilaterally during the day, may remove at night.     AQUAPHOR TO DRY SKIN ON KNEES     Treatment Orders: Follow a nutritious diet. Choose foods high in protein: chicken, fish, and eggs. Choose foods high in Vitamin C. Multivitamin daily unless contraindicated. Offload back as often as possible. Try to change position every 2 hours. Offload feet with eggcrate cushions.     Increase protein in diet to aid in healing process- ISOPURE, Ensure          [facility] followup visit _______2 weeks _____________  (Please note your next appointment above and if you are unable to keep, kindly give a 24 hour notice. Thank you.)     Physician signature:__________________________        If you experience any of the following, please call the Wound Care Center during business hours:     * Increase in Pain  * Temperature over 101  * Increase in drainage from your wound  * Drainage with a foul odor  * Bleeding  * Increase in swelling  * Need for compression bandage changes due to slippage, breakthrough drainage.     If you need medical attention outside of the business hours of the Wound Care Centers please contact your PCP or go to the nearest emergency room.

## 2024-07-12 ENCOUNTER — HOSPITAL ENCOUNTER (OUTPATIENT)
Dept: WOUND CARE | Age: 49
Discharge: HOME | End: 2024-07-12
Attending: EMERGENCY MEDICINE | Admitting: SURGERY
Payer: MEDICARE

## 2024-07-12 VITALS
RESPIRATION RATE: 18 BRPM | DIASTOLIC BLOOD PRESSURE: 80 MMHG | SYSTOLIC BLOOD PRESSURE: 130 MMHG | TEMPERATURE: 96.8 F | HEART RATE: 82 BPM

## 2024-07-12 DIAGNOSIS — L89.90 DECUBITUS ULCER DUE TO SPINA BIFIDA (HCC): Primary | ICD-10-CM

## 2024-07-12 DIAGNOSIS — L89.103 DECUBITUS ULCER OF BACK, STAGE 3 (HCC): ICD-10-CM

## 2024-07-12 DIAGNOSIS — Q05.4 SPINA BIFIDA WITH HYDROCEPHALUS, UNSPECIFIED SPINAL REGION (HCC): ICD-10-CM

## 2024-07-12 DIAGNOSIS — L89.313 DECUBITUS ULCER OF ISCHIAL AREA, RIGHT, STAGE III (HCC): ICD-10-CM

## 2024-07-12 DIAGNOSIS — L97.512 ULCER OF FOOT, RIGHT, WITH FAT LAYER EXPOSED (HCC): ICD-10-CM

## 2024-07-12 DIAGNOSIS — I89.0 LYMPHEDEMA OF BOTH LOWER EXTREMITIES: ICD-10-CM

## 2024-07-12 DIAGNOSIS — L97.522 ULCER OF FOOT, LEFT, WITH FAT LAYER EXPOSED (HCC): ICD-10-CM

## 2024-07-12 DIAGNOSIS — Q05.9 DECUBITUS ULCER DUE TO SPINA BIFIDA (HCC): Primary | ICD-10-CM

## 2024-07-12 DIAGNOSIS — L89.104 DECUBITUS ULCER OF BACK, STAGE 4 (HCC): ICD-10-CM

## 2024-07-12 PROCEDURE — 11046 DBRDMT MUSC&/FSCA EA ADDL: CPT

## 2024-07-12 PROCEDURE — 11043 DBRDMT MUSC&/FSCA 1ST 20/<: CPT

## 2024-07-12 RX ORDER — IBUPROFEN 200 MG
TABLET ORAL ONCE
OUTPATIENT
Start: 2024-07-12 | End: 2024-07-12

## 2024-07-12 RX ORDER — LIDOCAINE HYDROCHLORIDE 40 MG/ML
SOLUTION TOPICAL ONCE
Status: COMPLETED | OUTPATIENT
Start: 2024-07-12 | End: 2024-07-12

## 2024-07-12 RX ORDER — GENTAMICIN SULFATE 1 MG/G
OINTMENT TOPICAL ONCE
OUTPATIENT
Start: 2024-07-12 | End: 2024-07-12

## 2024-07-12 RX ORDER — LIDOCAINE HYDROCHLORIDE 40 MG/ML
SOLUTION TOPICAL ONCE
OUTPATIENT
Start: 2024-07-12 | End: 2024-07-12

## 2024-07-12 RX ORDER — TRIAMCINOLONE ACETONIDE 1 MG/G
OINTMENT TOPICAL ONCE
OUTPATIENT
Start: 2024-07-12 | End: 2024-07-12

## 2024-07-12 RX ORDER — BACITRACIN ZINC AND POLYMYXIN B SULFATE 500; 1000 [USP'U]/G; [USP'U]/G
OINTMENT TOPICAL ONCE
OUTPATIENT
Start: 2024-07-12 | End: 2024-07-12

## 2024-07-12 RX ORDER — LIDOCAINE 50 MG/G
OINTMENT TOPICAL ONCE
OUTPATIENT
Start: 2024-07-12 | End: 2024-07-12

## 2024-07-12 RX ORDER — LIDOCAINE 40 MG/G
CREAM TOPICAL ONCE
OUTPATIENT
Start: 2024-07-12 | End: 2024-07-12

## 2024-07-12 RX ORDER — LIDOCAINE HYDROCHLORIDE 20 MG/ML
JELLY TOPICAL ONCE
OUTPATIENT
Start: 2024-07-12 | End: 2024-07-12

## 2024-07-12 RX ORDER — SODIUM CHLOR/HYPOCHLOROUS ACID 0.033 %
SOLUTION, IRRIGATION IRRIGATION ONCE
OUTPATIENT
Start: 2024-07-12 | End: 2024-07-12

## 2024-07-12 RX ORDER — BETAMETHASONE DIPROPIONATE 0.5 MG/G
CREAM TOPICAL ONCE
OUTPATIENT
Start: 2024-07-12 | End: 2024-07-12

## 2024-07-12 RX ORDER — GINSENG 100 MG
CAPSULE ORAL ONCE
OUTPATIENT
Start: 2024-07-12 | End: 2024-07-12

## 2024-07-12 RX ORDER — CLOBETASOL PROPIONATE 0.5 MG/G
OINTMENT TOPICAL ONCE
OUTPATIENT
Start: 2024-07-12 | End: 2024-07-12

## 2024-07-12 RX ADMIN — LIDOCAINE HYDROCHLORIDE 6 ML: 40 SOLUTION TOPICAL at 15:14

## 2024-07-12 ASSESSMENT — PAIN DESCRIPTION - DESCRIPTORS: DESCRIPTORS: BURNING

## 2024-07-12 ASSESSMENT — PAIN SCALES - GENERAL: PAINLEVEL_OUTOF10: 4

## 2024-07-12 ASSESSMENT — PAIN DESCRIPTION - LOCATION: LOCATION: BACK

## 2024-07-12 ASSESSMENT — PAIN - FUNCTIONAL ASSESSMENT: PAIN_FUNCTIONAL_ASSESSMENT: ACTIVITIES ARE NOT PREVENTED

## 2024-07-12 ASSESSMENT — PAIN DESCRIPTION - ORIENTATION: ORIENTATION: MID

## 2024-07-12 NOTE — PROGRESS NOTES
140       Wound 05/03/24 Knee Right new wound #11 right knee (Active)   Wound Image   07/12/24 1500   Dressing Status New dressing applied 06/28/24 1402   Wound Cleansed Cleansed with saline 06/28/24 1402   Dressing/Treatment Alginate;Silicone border 06/28/24 1402   Wound Length (cm) 0 cm 07/12/24 1500   Wound Width (cm) 0 cm 07/12/24 1500   Wound Depth (cm) 0 cm 07/12/24 1500   Wound Surface Area (cm^2) 0 cm^2 07/12/24 1500   Change in Wound Size % (l*w) 100 07/12/24 1500   Wound Volume (cm^3) 0 cm^3 07/12/24 1500   Wound Healing % 100 07/12/24 1500   Post-Procedure Length (cm) 0 cm 07/12/24 1523   Post-Procedure Width (cm) 0 cm 07/12/24 1523   Post-Procedure Depth (cm) 0 cm 07/12/24 1523   Post-Procedure Surface Area (cm^2) 0 cm^2 07/12/24 1523   Post-Procedure Volume (cm^3) 0 cm^3 07/12/24 1523   Wound Assessment Epithelialization 07/12/24 1500   Drainage Amount None (dry) 07/12/24 1500   Drainage Description Serosanguinous 06/28/24 1331   Odor None 07/12/24 1500   Ceci-wound Assessment Intact;Dry/flaky 07/12/24 1500   Number of days: 70       Wound 05/03/24 Heel Left new wound #12 left post heel (Active)   Wound Image   07/12/24 1500   Wound Etiology Pressure Stage 2 05/03/24 1412   Dressing Status New dressing applied 07/12/24 1549   Wound Cleansed Irrigated with saline 07/12/24 1549   Dressing/Treatment Alginate with Ag;Silicone border 07/12/24 1549   Wound Length (cm) 1 cm 07/12/24 1500   Wound Width (cm) 1.3 cm 07/12/24 1500   Wound Depth (cm) 0.2 cm 07/12/24 1500   Wound Surface Area (cm^2) 1.3 cm^2 07/12/24 1500   Change in Wound Size % (l*w) -160 07/12/24 1500   Wound Volume (cm^3) 0.26 cm^3 07/12/24 1500   Wound Healing % -73 07/12/24 1500   Post-Procedure Length (cm) 1 cm 07/12/24 1523   Post-Procedure Width (cm) 1.3 cm 07/12/24 1523   Post-Procedure Depth (cm) 0.3 cm 07/12/24 1523   Post-Procedure Surface Area (cm^2) 1.3 cm^2 07/12/24 1523   Post-Procedure Volume (cm^3) 0.39 cm^3 07/12/24 1523   Wound

## 2024-07-24 NOTE — DISCHARGE INSTRUCTIONS
Visit Discharge/Physician Orders     Discharge condition: Stable     Assessment of pain at discharge: MILD     Anesthetic used: 4% LIDOCAINE     Discharge to: Home     Left via:Private automobile     Accompanied by: accompanied by mother     ECF/HHA:  [facility] - PLEASE FAX NEW ORDER     Dressing Orders:   TO BACK CLEAN WITH SALINE APPLY SANTYL, DRY DRESSING, CHANGE DAILY      May pad wounds with foam for extra offloading      Begin to wear spandigrips bilaterally during the day, may remove at night.      AQUAPHOR TO DRY SKIN ON KNEES     Treatment Orders: Follow a nutritious diet. Choose foods high in protein: chicken, fish, and eggs. Choose foods high in Vitamin C. Multivitamin daily unless contraindicated. Offload back as often as possible. Try to change position every 2 hours. Offload feet with eggcrate cushions.     Increase protein in diet to aid in healing process- ISOPURE, Ensure           [facility] followup visit _______2 weeks _____________  (Please note your next appointment above and if you are unable to keep, kindly give a 24 hour notice. Thank you.)     Physician signature:__________________________        If you experience any of the following, please call the Wound Care Center during business hours:     * Increase in Pain  * Temperature over 101  * Increase in drainage from your wound  * Drainage with a foul odor  * Bleeding  * Increase in swelling  * Need for compression bandage changes due to slippage, breakthrough drainage.     If you need medical attention outside of the business hours of the Wound Care Centers please contact your PCP or go to the nearest emergency room.

## 2024-07-26 ENCOUNTER — HOSPITAL ENCOUNTER (OUTPATIENT)
Dept: WOUND CARE | Age: 49
Discharge: HOME | End: 2024-07-26
Attending: EMERGENCY MEDICINE | Admitting: SURGERY
Payer: MEDICARE

## 2024-07-26 VITALS
BODY MASS INDEX: 39.87 KG/M2 | SYSTOLIC BLOOD PRESSURE: 154 MMHG | WEIGHT: 225 LBS | HEART RATE: 100 BPM | HEIGHT: 63 IN | TEMPERATURE: 97.9 F | DIASTOLIC BLOOD PRESSURE: 87 MMHG | RESPIRATION RATE: 18 BRPM

## 2024-07-26 DIAGNOSIS — L89.90 DECUBITUS ULCER DUE TO SPINA BIFIDA (HCC): Primary | ICD-10-CM

## 2024-07-26 DIAGNOSIS — Q05.4 SPINA BIFIDA WITH HYDROCEPHALUS, UNSPECIFIED SPINAL REGION (HCC): ICD-10-CM

## 2024-07-26 DIAGNOSIS — L97.522 ULCER OF FOOT, LEFT, WITH FAT LAYER EXPOSED (HCC): ICD-10-CM

## 2024-07-26 DIAGNOSIS — L89.103 DECUBITUS ULCER OF BACK, STAGE 3 (HCC): ICD-10-CM

## 2024-07-26 DIAGNOSIS — I89.0 LYMPHEDEMA OF BOTH LOWER EXTREMITIES: ICD-10-CM

## 2024-07-26 DIAGNOSIS — L89.104 DECUBITUS ULCER OF BACK, STAGE 4 (HCC): ICD-10-CM

## 2024-07-26 DIAGNOSIS — L89.313 DECUBITUS ULCER OF ISCHIAL AREA, RIGHT, STAGE III (HCC): ICD-10-CM

## 2024-07-26 DIAGNOSIS — L97.512 ULCER OF FOOT, RIGHT, WITH FAT LAYER EXPOSED (HCC): ICD-10-CM

## 2024-07-26 DIAGNOSIS — Q05.9 DECUBITUS ULCER DUE TO SPINA BIFIDA (HCC): Primary | ICD-10-CM

## 2024-07-26 PROCEDURE — 11043 DBRDMT MUSC&/FSCA 1ST 20/<: CPT

## 2024-07-26 PROCEDURE — 11046 DBRDMT MUSC&/FSCA EA ADDL: CPT

## 2024-07-26 RX ORDER — TRIAMCINOLONE ACETONIDE 1 MG/G
OINTMENT TOPICAL ONCE
OUTPATIENT
Start: 2024-07-26 | End: 2024-07-26

## 2024-07-26 RX ORDER — CLOBETASOL PROPIONATE 0.5 MG/G
OINTMENT TOPICAL ONCE
OUTPATIENT
Start: 2024-07-26 | End: 2024-07-26

## 2024-07-26 RX ORDER — SODIUM CHLOR/HYPOCHLOROUS ACID 0.033 %
SOLUTION, IRRIGATION IRRIGATION ONCE
OUTPATIENT
Start: 2024-07-26 | End: 2024-07-26

## 2024-07-26 RX ORDER — GINSENG 100 MG
CAPSULE ORAL ONCE
OUTPATIENT
Start: 2024-07-26 | End: 2024-07-26

## 2024-07-26 RX ORDER — LIDOCAINE HYDROCHLORIDE 20 MG/ML
JELLY TOPICAL ONCE
OUTPATIENT
Start: 2024-07-26 | End: 2024-07-26

## 2024-07-26 RX ORDER — GENTAMICIN SULFATE 1 MG/G
OINTMENT TOPICAL ONCE
OUTPATIENT
Start: 2024-07-26 | End: 2024-07-26

## 2024-07-26 RX ORDER — LIDOCAINE HYDROCHLORIDE 40 MG/ML
SOLUTION TOPICAL ONCE
Status: COMPLETED | OUTPATIENT
Start: 2024-07-26 | End: 2024-07-26

## 2024-07-26 RX ORDER — BETAMETHASONE DIPROPIONATE 0.5 MG/G
CREAM TOPICAL ONCE
OUTPATIENT
Start: 2024-07-26 | End: 2024-07-26

## 2024-07-26 RX ORDER — BACITRACIN ZINC AND POLYMYXIN B SULFATE 500; 1000 [USP'U]/G; [USP'U]/G
OINTMENT TOPICAL ONCE
OUTPATIENT
Start: 2024-07-26 | End: 2024-07-26

## 2024-07-26 RX ORDER — IBUPROFEN 200 MG
TABLET ORAL ONCE
OUTPATIENT
Start: 2024-07-26 | End: 2024-07-26

## 2024-07-26 RX ORDER — LIDOCAINE 50 MG/G
OINTMENT TOPICAL ONCE
OUTPATIENT
Start: 2024-07-26 | End: 2024-07-26

## 2024-07-26 RX ORDER — LIDOCAINE 40 MG/G
CREAM TOPICAL ONCE
OUTPATIENT
Start: 2024-07-26 | End: 2024-07-26

## 2024-07-26 RX ORDER — LIDOCAINE HYDROCHLORIDE 40 MG/ML
SOLUTION TOPICAL ONCE
OUTPATIENT
Start: 2024-07-26 | End: 2024-07-26

## 2024-07-26 RX ADMIN — LIDOCAINE HYDROCHLORIDE 15 ML: 40 SOLUTION TOPICAL at 13:12

## 2024-07-26 NOTE — PROGRESS NOTES
Attached edges 06/14/24 1327   Number of days: 83          Procedure Note  Indications:  Based on my examination of this patient's wound(s)/ulcer(s) today, debridement is required to promote healing and evaluate the wound base.    Performed by: LENNY Lal CNP    Consent obtained:  Yes    Time out taken:  Yes    Pain Control: Anesthetic  Anesthetic: 4% Lidocaine Liquid Topical     Debridement:Excisional Debridement    Using curette the wound(s)/ulcer(s) was/were sharply debrided down through and including the removal of subcutaneous tissue and muscle/fascia.        Devitalized Tissue Debrided:  fibrin, biofilm, slough, and exudate      Wound/Ulcer #: 1      Percent of Wound/Ulcer Debrided: 100%    Total Surface Area Debrided:  51.04 sq cm     Estimated Blood Loss:  Minimal    Hemostasis Achieved:  by pressure    Procedural Pain:  0  / 10     Post Procedural Pain:  0 / 10     Response to treatment:  Well tolerated by patient.     A culture was not done.      Plan:     Pt is not a smoker   In my professional opinion and based off the information that is available at this time this patient has appropriate indication for HBO Therapy: No    Treatment Note please see attached Discharge Instructions    Written patient dismissal instructions given to patient and signed by patient or POA.         Discharge Instructions         Visit Discharge/Physician Orders     Discharge condition: Stable     Assessment of pain at discharge: MILD     Anesthetic used: 4% LIDOCAINE     Discharge to: Home     Left via:Private automobile     Accompanied by: accompanied by mother     ECF/HHA:  [facility] - PLEASE FAX NEW ORDER     Dressing Orders:   TO BACK CLEAN WITH SALINE APPLY SANTYL, DRY DRESSING, CHANGE DAILY      May pad wounds with foam for extra offloading      Begin to wear spandigrips bilaterally during the day, may remove at night.      AQUAPHOR TO DRY SKIN ON KNEES     Treatment Orders: Follow a nutritious diet.

## 2024-08-08 NOTE — DISCHARGE INSTRUCTIONS
Visit Discharge/Physician Orders     Discharge condition: Stable     Assessment of pain at discharge: MILD     Anesthetic used: 4% LIDOCAINE     Discharge to: Home     Left via:Private automobile     Accompanied by: accompanied by mother     ECF/HHA:  [facility] - PLEASE FAX NEW ORDER     Dressing Orders: TO BACK CLEAN WITH SALINE APPLY SANTYL, DRY DRESSING, CHANGE DAILY      May pad wounds with foam for extra offloading      Begin to wear spandigrips bilaterally during the day, may remove at night.      AQUAPHOR TO DRY SKIN ON KNEES     Treatment Orders: Follow a nutritious diet. Choose foods high in protein: chicken, fish, and eggs. Choose foods high in Vitamin C. Multivitamin daily unless contraindicated. Offload back as often as possible. Try to change position every 2 hours. Offload feet with eggcrate cushions.     Increase protein in diet to aid in healing process- ISOPURE, Ensure           [facility] followup visit _______2 weeks _____________  (Please note your next appointment above and if you are unable to keep, kindly give a 24 hour notice. Thank you.)     Physician signature:__________________________        If you experience any of the following, please call the Wound Care Center during business hours:     * Increase in Pain  * Temperature over 101  * Increase in drainage from your wound  * Drainage with a foul odor  * Bleeding  * Increase in swelling  * Need for compression bandage changes due to slippage, breakthrough drainage.     If you need medical attention outside of the business hours of the Wound Care Centers please contact your PCP or go to the nearest emergency room.

## 2024-08-09 ENCOUNTER — HOSPITAL ENCOUNTER (OUTPATIENT)
Dept: WOUND CARE | Age: 49
Discharge: HOME | End: 2024-08-09
Attending: EMERGENCY MEDICINE | Admitting: SURGERY
Payer: MEDICARE

## 2024-08-09 VITALS
RESPIRATION RATE: 16 BRPM | SYSTOLIC BLOOD PRESSURE: 138 MMHG | TEMPERATURE: 97.3 F | DIASTOLIC BLOOD PRESSURE: 88 MMHG | HEART RATE: 84 BPM

## 2024-08-09 DIAGNOSIS — L89.313 DECUBITUS ULCER OF ISCHIAL AREA, RIGHT, STAGE III (HCC): ICD-10-CM

## 2024-08-09 DIAGNOSIS — L89.104 DECUBITUS ULCER OF BACK, STAGE 4 (HCC): ICD-10-CM

## 2024-08-09 DIAGNOSIS — L89.103 DECUBITUS ULCER OF BACK, STAGE 3 (HCC): ICD-10-CM

## 2024-08-09 DIAGNOSIS — L89.90 DECUBITUS ULCER DUE TO SPINA BIFIDA (HCC): Primary | ICD-10-CM

## 2024-08-09 DIAGNOSIS — L97.522 ULCER OF FOOT, LEFT, WITH FAT LAYER EXPOSED (HCC): ICD-10-CM

## 2024-08-09 DIAGNOSIS — Q05.9 DECUBITUS ULCER DUE TO SPINA BIFIDA (HCC): Primary | ICD-10-CM

## 2024-08-09 DIAGNOSIS — L97.512 ULCER OF FOOT, RIGHT, WITH FAT LAYER EXPOSED (HCC): ICD-10-CM

## 2024-08-09 PROCEDURE — 11043 DBRDMT MUSC&/FSCA 1ST 20/<: CPT

## 2024-08-09 PROCEDURE — 11046 DBRDMT MUSC&/FSCA EA ADDL: CPT

## 2024-08-09 RX ORDER — TRIAMCINOLONE ACETONIDE 1 MG/G
OINTMENT TOPICAL ONCE
OUTPATIENT
Start: 2024-08-09 | End: 2024-08-09

## 2024-08-09 RX ORDER — GENTAMICIN SULFATE 1 MG/G
OINTMENT TOPICAL ONCE
OUTPATIENT
Start: 2024-08-09 | End: 2024-08-09

## 2024-08-09 RX ORDER — SODIUM CHLOR/HYPOCHLOROUS ACID 0.033 %
SOLUTION, IRRIGATION IRRIGATION ONCE
OUTPATIENT
Start: 2024-08-09 | End: 2024-08-09

## 2024-08-09 RX ORDER — GINSENG 100 MG
CAPSULE ORAL ONCE
OUTPATIENT
Start: 2024-08-09 | End: 2024-08-09

## 2024-08-09 RX ORDER — BETAMETHASONE DIPROPIONATE 0.5 MG/G
CREAM TOPICAL ONCE
OUTPATIENT
Start: 2024-08-09 | End: 2024-08-09

## 2024-08-09 RX ORDER — CLOBETASOL PROPIONATE 0.5 MG/G
OINTMENT TOPICAL ONCE
OUTPATIENT
Start: 2024-08-09 | End: 2024-08-09

## 2024-08-09 RX ORDER — LIDOCAINE HYDROCHLORIDE 40 MG/ML
SOLUTION TOPICAL ONCE
Status: COMPLETED | OUTPATIENT
Start: 2024-08-09 | End: 2024-08-09

## 2024-08-09 RX ORDER — LIDOCAINE HYDROCHLORIDE 40 MG/ML
SOLUTION TOPICAL ONCE
OUTPATIENT
Start: 2024-08-09 | End: 2024-08-09

## 2024-08-09 RX ORDER — BACITRACIN ZINC AND POLYMYXIN B SULFATE 500; 1000 [USP'U]/G; [USP'U]/G
OINTMENT TOPICAL ONCE
OUTPATIENT
Start: 2024-08-09 | End: 2024-08-09

## 2024-08-09 RX ORDER — IBUPROFEN 200 MG
TABLET ORAL ONCE
OUTPATIENT
Start: 2024-08-09 | End: 2024-08-09

## 2024-08-09 RX ORDER — LIDOCAINE HYDROCHLORIDE 20 MG/ML
JELLY TOPICAL ONCE
OUTPATIENT
Start: 2024-08-09 | End: 2024-08-09

## 2024-08-09 RX ORDER — LIDOCAINE 50 MG/G
OINTMENT TOPICAL ONCE
OUTPATIENT
Start: 2024-08-09 | End: 2024-08-09

## 2024-08-09 RX ORDER — LIDOCAINE 40 MG/G
CREAM TOPICAL ONCE
OUTPATIENT
Start: 2024-08-09 | End: 2024-08-09

## 2024-08-09 RX ADMIN — LIDOCAINE HYDROCHLORIDE 6 ML: 40 SOLUTION TOPICAL at 13:20

## 2024-08-09 ASSESSMENT — PAIN DESCRIPTION - LOCATION: LOCATION: BACK

## 2024-08-09 ASSESSMENT — PAIN - FUNCTIONAL ASSESSMENT: PAIN_FUNCTIONAL_ASSESSMENT: ACTIVITIES ARE NOT PREVENTED

## 2024-08-09 ASSESSMENT — PAIN DESCRIPTION - DESCRIPTORS: DESCRIPTORS: DULL

## 2024-08-09 ASSESSMENT — PAIN SCALES - GENERAL: PAINLEVEL_OUTOF10: 2

## 2024-08-09 NOTE — PLAN OF CARE
Problem: Wound:  Goal: Will show signs of wound healing; wound closure and no evidence of infection  Description: Will show signs of wound healing; wound closure and no evidence of infection  Outcome: Progressing     Problem: Pressure Ulcer:  Goal: Will show no infection signs and symptoms  Description: Will show no infection signs and symptoms  Outcome: Progressing

## 2024-08-09 NOTE — PROGRESS NOTES
Wound Healing Center /Hyperbarics   History and Physical/Consultation  Vascular    Referring Physician : Massimo Yoon MD  Thai Church  MEDICAL RECORD NUMBER:  [id number]  AGE: 48 y.o.   GENDER: male  : 1975  EPISODE DATE:  2024  Subjective:     Chief Complaint   Patient presents with    Wound Check     \"My back wound is getting better\"         HISTORY of PRESENT ILLNESS HPI     Thai Church is a 48 y.o. male who presents today for wound/ulcer evaluation.   History of Wound Context:  The patient has had a wounds of both feet, right ischium, back pressure ulcers between the junction of the lumbar spine and thoracic spine which was first noted approximately at least 3 to 4 months, patient and his mother tell me that they noticed that when the patient got home from a extended care facility but they do not recall how long he had ulcers,.  This has been treated at the [place] wound care center and family came to [facility], and in the past he was treated here with good results.  On their initial visit to the wound healing center, 23, the patient has noted that the wound has not been improving.  The patient has had similar previous wounds in the past.        Patient has spina bifida with hydrocephalus, has undergone previous surgeries multiple including peritoneal venous shunt for hydrocephalus in the [place], has paraplegia, does not ambulate, recently underwent insertion of suprapubic catheter    Pt is currently not on abx.      Wound/Ulcer Pain Timing/Severity: constant  Quality of pain: dull, aching  Severity:  3 / 10   Modifying Factors: None  Associated Signs/Symptoms: drainage and pain    Ulcer Identification:  Ulcer Type: pressure and non-healing/non-surgical  Contributing Factors: lymphedema, chronic pressure, decreased mobility, and shear force    Diabetic/Pressure/Non Pressure Ulcers only:  Ulcer: N/A    If patient has diabetic lower extremity wounds  Zapata

## 2024-08-16 NOTE — DISCHARGE INSTRUCTIONS
Visit Discharge/Physician Orders     Discharge condition: Stable     Assessment of pain at discharge: MILD     Anesthetic used: 4% LIDOCAINE     Discharge to: Home     Left via:Private automobile     Accompanied by: accompanied by mother     ECF/HHA:  [facility] - PLEASE FAX NEW ORDER     Dressing Orders: TO BACK CLEAN WITH SALINE APPLY SANTYL, DRY DRESSING, CHANGE DAILY        May pad wounds with foam for extra offloading      Begin to wear spandigrips bilaterally during the day, may remove at night.      AQUAPHOR TO DRY SKIN ON KNEES     Treatment Orders: Follow a nutritious diet. Choose foods high in protein: chicken, fish, and eggs. Choose foods high in Vitamin C. Multivitamin daily unless contraindicated. Offload back as often as possible. Try to change position every 2 hours. Offload feet with eggcrate cushions.     Increase protein in diet to aid in healing process- ISOPURE, Ensure           [facility] followup visit _______2 weeks _____________  (Please note your next appointment above and if you are unable to keep, kindly give a 24 hour notice. Thank you.)     Physician signature:__________________________        If you experience any of the following, please call the Wound Care Center during business hours:     * Increase in Pain  * Temperature over 101  * Increase in drainage from your wound  * Drainage with a foul odor  * Bleeding  * Increase in swelling  * Need for compression bandage changes due to slippage, breakthrough drainage.     If you need medical attention outside of the business hours of the Wound Care Centers please contact your PCP or go to the nearest emergency room.

## 2024-08-23 ENCOUNTER — HOSPITAL ENCOUNTER (OUTPATIENT)
Dept: WOUND CARE | Age: 49
Discharge: HOME | End: 2024-08-23
Attending: EMERGENCY MEDICINE | Admitting: SURGERY
Payer: MEDICARE

## 2024-08-23 VITALS
TEMPERATURE: 97.2 F | DIASTOLIC BLOOD PRESSURE: 83 MMHG | RESPIRATION RATE: 18 BRPM | SYSTOLIC BLOOD PRESSURE: 152 MMHG | HEART RATE: 79 BPM

## 2024-08-23 DIAGNOSIS — Q05.9 DECUBITUS ULCER DUE TO SPINA BIFIDA (HCC): Primary | ICD-10-CM

## 2024-08-23 DIAGNOSIS — L89.90 DECUBITUS ULCER DUE TO SPINA BIFIDA (HCC): Primary | ICD-10-CM

## 2024-08-23 DIAGNOSIS — L89.104 DECUBITUS ULCER OF BACK, STAGE 4 (HCC): ICD-10-CM

## 2024-08-23 PROCEDURE — 11045 DBRDMT SUBQ TISS EACH ADDL: CPT

## 2024-08-23 PROCEDURE — 11042 DBRDMT SUBQ TIS 1ST 20SQCM/<: CPT

## 2024-08-23 RX ORDER — SODIUM CHLOR/HYPOCHLOROUS ACID 0.033 %
SOLUTION, IRRIGATION IRRIGATION ONCE
OUTPATIENT
Start: 2024-08-23 | End: 2024-08-23

## 2024-08-23 RX ORDER — LIDOCAINE 50 MG/G
OINTMENT TOPICAL ONCE
OUTPATIENT
Start: 2024-08-23 | End: 2024-08-23

## 2024-08-23 RX ORDER — LIDOCAINE HYDROCHLORIDE 40 MG/ML
SOLUTION TOPICAL ONCE
Status: COMPLETED | OUTPATIENT
Start: 2024-08-23 | End: 2024-08-23

## 2024-08-23 RX ORDER — BACITRACIN ZINC AND POLYMYXIN B SULFATE 500; 1000 [USP'U]/G; [USP'U]/G
OINTMENT TOPICAL ONCE
OUTPATIENT
Start: 2024-08-23 | End: 2024-08-23

## 2024-08-23 RX ORDER — TRIAMCINOLONE ACETONIDE 1 MG/G
OINTMENT TOPICAL ONCE
OUTPATIENT
Start: 2024-08-23 | End: 2024-08-23

## 2024-08-23 RX ORDER — CLOBETASOL PROPIONATE 0.5 MG/G
OINTMENT TOPICAL ONCE
OUTPATIENT
Start: 2024-08-23 | End: 2024-08-23

## 2024-08-23 RX ORDER — LIDOCAINE HYDROCHLORIDE 20 MG/ML
JELLY TOPICAL ONCE
OUTPATIENT
Start: 2024-08-23 | End: 2024-08-23

## 2024-08-23 RX ORDER — GINSENG 100 MG
CAPSULE ORAL ONCE
OUTPATIENT
Start: 2024-08-23 | End: 2024-08-23

## 2024-08-23 RX ORDER — IBUPROFEN 200 MG
TABLET ORAL ONCE
OUTPATIENT
Start: 2024-08-23 | End: 2024-08-23

## 2024-08-23 RX ORDER — LIDOCAINE 40 MG/G
CREAM TOPICAL ONCE
OUTPATIENT
Start: 2024-08-23 | End: 2024-08-23

## 2024-08-23 RX ORDER — BETAMETHASONE DIPROPIONATE 0.5 MG/G
CREAM TOPICAL ONCE
OUTPATIENT
Start: 2024-08-23 | End: 2024-08-23

## 2024-08-23 RX ORDER — LIDOCAINE HYDROCHLORIDE 40 MG/ML
SOLUTION TOPICAL ONCE
OUTPATIENT
Start: 2024-08-23 | End: 2024-08-23

## 2024-08-23 RX ORDER — GENTAMICIN SULFATE 1 MG/G
OINTMENT TOPICAL ONCE
OUTPATIENT
Start: 2024-08-23 | End: 2024-08-23

## 2024-08-23 RX ADMIN — LIDOCAINE HYDROCHLORIDE: 40 SOLUTION TOPICAL at 13:09

## 2024-08-23 NOTE — PROGRESS NOTES
Home Health nurse called, JUNI jeffers wound reopened. Measuring 1 x 2 x 0.1 red/pink/slough. Moderate serosangeous discharge. Spoke with patient's provider: CAMPBELL Lal. Who wants plain aquacel and dry dressing on wound until next appointment.  
  Undermining Ends___ O'Clock 5 06/28/24 1331   Undermining Maxium Distance (cm) .2 06/28/24 1331   Wound Assessment Fibrin;Devitalized tissue;Pink/red;Slough 08/23/24 1310   Drainage Amount Large (50-75% saturated) 08/23/24 1310   Drainage Description Thick;Yellow;Serosanguinous 08/23/24 1310   Odor Mild 08/23/24 1310   Ceci-wound Assessment Fragile 08/23/24 1310   Margins Attached edges 08/09/24 1311   Number of days: 412        Procedure Note  Indications:  Based on my examination of this patient's wound(s)/ulcer(s) today, debridement is required to promote healing and evaluate the wound base.    Performed by: LENNY Lal CNP    Consent obtained:  Yes    Time out taken:  Yes    Pain Control: Anesthetic  Anesthetic: 4% Lidocaine Liquid Topical     Debridement:Excisional Debridement    Using curette the wound(s)/ulcer(s) was/were sharply debrided down through and including the removal of subcutaneous tissue.        Devitalized Tissue Debrided:  fibrin, biofilm, slough, and exudate    Wound/Ulcer #: 1    Percent of Wound/Ulcer Debrided: 100%    Total Surface Area Debrided:  68.16 sq cm     Estimated Blood Loss:  Minimal    Hemostasis Achieved:  by pressure    Procedural Pain:  0  / 10     Post Procedural Pain:  0 / 10     Response to treatment:  Well tolerated by patient.     A culture was not done.      Plan:     Pt is not a smoker   In my professional opinion and based off the information that is available at this time this patient has appropriate indication for HBO Therapy: No    Treatment Note please see attached Discharge Instructions    Written patient dismissal instructions given to patient and signed by patient or POA.         Discharge Instructions         Visit Discharge/Physician Orders     Discharge condition: Stable     Assessment of pain at discharge: MILD     Anesthetic used: 4% LIDOCAINE     Discharge to: Home     Left via:Private automobile     Accompanied by: accompanied by mother

## 2024-08-23 NOTE — PLAN OF CARE
Problem: Pressure Ulcer:  Goal: Signs of wound healing will improve  Description: Signs of wound healing will improve  Outcome: Progressing  Goal: Absence of new pressure ulcer  Description: Absence of new pressure ulcer  Outcome: Progressing

## 2024-09-03 NOTE — DISCHARGE INSTRUCTIONS

## 2024-09-06 ENCOUNTER — HOSPITAL ENCOUNTER (OUTPATIENT)
Dept: WOUND CARE | Age: 49
Discharge: HOME | End: 2024-09-06
Attending: EMERGENCY MEDICINE | Admitting: SURGERY
Payer: MEDICARE

## 2024-09-06 VITALS
TEMPERATURE: 97.1 F | HEIGHT: 63 IN | SYSTOLIC BLOOD PRESSURE: 136 MMHG | WEIGHT: 225 LBS | HEART RATE: 78 BPM | DIASTOLIC BLOOD PRESSURE: 92 MMHG | BODY MASS INDEX: 39.87 KG/M2

## 2024-09-06 DIAGNOSIS — L89.90 DECUBITUS ULCER DUE TO SPINA BIFIDA (HCC): Primary | ICD-10-CM

## 2024-09-06 DIAGNOSIS — L97.522 ULCER OF FOOT, LEFT, WITH FAT LAYER EXPOSED (HCC): ICD-10-CM

## 2024-09-06 DIAGNOSIS — Q05.9 DECUBITUS ULCER DUE TO SPINA BIFIDA (HCC): Primary | ICD-10-CM

## 2024-09-06 DIAGNOSIS — L89.104 DECUBITUS ULCER OF BACK, STAGE 4 (HCC): ICD-10-CM

## 2024-09-06 PROCEDURE — 11042 DBRDMT SUBQ TIS 1ST 20SQCM/<: CPT

## 2024-09-06 PROCEDURE — 11045 DBRDMT SUBQ TISS EACH ADDL: CPT

## 2024-09-06 RX ORDER — LIDOCAINE HYDROCHLORIDE 20 MG/ML
JELLY TOPICAL ONCE
OUTPATIENT
Start: 2024-09-06 | End: 2024-09-06

## 2024-09-06 RX ORDER — SILVER SULFADIAZINE 10 MG/G
CREAM TOPICAL ONCE
OUTPATIENT
Start: 2024-09-06 | End: 2024-09-06

## 2024-09-06 RX ORDER — GINSENG 100 MG
CAPSULE ORAL ONCE
OUTPATIENT
Start: 2024-09-06 | End: 2024-09-06

## 2024-09-06 RX ORDER — MUPIROCIN 20 MG/G
OINTMENT TOPICAL ONCE
OUTPATIENT
Start: 2024-09-06 | End: 2024-09-06

## 2024-09-06 RX ORDER — LIDOCAINE 50 MG/G
OINTMENT TOPICAL ONCE
OUTPATIENT
Start: 2024-09-06 | End: 2024-09-06

## 2024-09-06 RX ORDER — LIDOCAINE 40 MG/G
CREAM TOPICAL ONCE
OUTPATIENT
Start: 2024-09-06 | End: 2024-09-06

## 2024-09-06 RX ORDER — NEOMYCIN/BACITRACIN/POLYMYXINB 3.5-400-5K
OINTMENT (GRAM) TOPICAL ONCE
OUTPATIENT
Start: 2024-09-06 | End: 2024-09-06

## 2024-09-06 RX ORDER — LIDOCAINE HYDROCHLORIDE 40 MG/ML
SOLUTION TOPICAL ONCE
Status: COMPLETED | OUTPATIENT
Start: 2024-09-06 | End: 2024-09-06

## 2024-09-06 RX ORDER — LIDOCAINE HYDROCHLORIDE 40 MG/ML
SOLUTION TOPICAL ONCE
OUTPATIENT
Start: 2024-09-06 | End: 2024-09-06

## 2024-09-06 RX ORDER — BACITRACIN ZINC AND POLYMYXIN B SULFATE 500; 1000 [USP'U]/G; [USP'U]/G
OINTMENT TOPICAL ONCE
OUTPATIENT
Start: 2024-09-06 | End: 2024-09-06

## 2024-09-06 RX ORDER — BETAMETHASONE DIPROPIONATE 0.5 MG/G
CREAM TOPICAL ONCE
OUTPATIENT
Start: 2024-09-06 | End: 2024-09-06

## 2024-09-06 RX ORDER — CLOBETASOL PROPIONATE 0.5 MG/G
OINTMENT TOPICAL ONCE
OUTPATIENT
Start: 2024-09-06 | End: 2024-09-06

## 2024-09-06 RX ORDER — SODIUM CHLOR/HYPOCHLOROUS ACID 0.033 %
SOLUTION, IRRIGATION IRRIGATION ONCE
OUTPATIENT
Start: 2024-09-06 | End: 2024-09-06

## 2024-09-06 RX ORDER — GENTAMICIN SULFATE 1 MG/G
OINTMENT TOPICAL ONCE
OUTPATIENT
Start: 2024-09-06 | End: 2024-09-06

## 2024-09-06 RX ORDER — TRIAMCINOLONE ACETONIDE 1 MG/G
OINTMENT TOPICAL ONCE
OUTPATIENT
Start: 2024-09-06 | End: 2024-09-06

## 2024-09-06 RX ADMIN — LIDOCAINE HYDROCHLORIDE 5 ML: 40 SOLUTION TOPICAL at 13:06

## 2024-09-06 NOTE — PLAN OF CARE
Problem: Cognitive:  Goal: Knowledge of wound care  Description: Knowledge of wound care  Outcome: Progressing  Goal: Understands risk factors for wounds  Description: Understands risk factors for wounds  Outcome: Progressing     Problem: Wound:  Goal: Will show signs of wound healing; wound closure and no evidence of infection  Description: Will show signs of wound healing; wound closure and no evidence of infection  Outcome: Progressing     Problem: Pressure Ulcer:  Goal: Signs of wound healing will improve  Description: Signs of wound healing will improve  Outcome: Progressing  Goal: Absence of new pressure ulcer  Description: Absence of new pressure ulcer  Outcome: Progressing  Goal: Will show no infection signs and symptoms  Description: Will show no infection signs and symptoms  Outcome: Progressing

## 2024-09-06 NOTE — PROGRESS NOTES
Wound Healing Center /Hyperbarics   History and Physical/Consultation  Vascular    Referring Physician : Massimo Yoon MD  Thai Church  MEDICAL RECORD NUMBER:  [id number]  AGE: 48 y.o.   GENDER: male  : 1975  EPISODE DATE:  2024  Subjective:     Chief Complaint   Patient presents with    Wound Check     \"Back and left foot\"         HISTORY of PRESENT ILLNESS HPI     Thai Church is a 48 y.o. male who presents today for wound/ulcer evaluation.   History of Wound Context:  The patient has had a wounds of both feet, right ischium, back pressure ulcers between the junction of the lumbar spine and thoracic spine which was first noted approximately at least 3 to 4 months, patient and his mother tell me that they noticed that when the patient got home from a extended care facility but they do not recall how long he had ulcers,.  This has been treated at the [place] wound care center and family came to [facility], and in the past he was treated here with good results.  On their initial visit to the wound healing center, 23, the patient has noted that the wound has not been improving.  The patient has had similar previous wounds in the past.        Patient has spina bifida with hydrocephalus, has undergone previous surgeries multiple including peritoneal venous shunt for hydrocephalus in the [place], has paraplegia, does not ambulate, recently underwent insertion of suprapubic catheter    Pt is currently not on abx.      Wound/Ulcer Pain Timing/Severity: constant  Quality of pain: dull, aching  Severity:  3 / 10   Modifying Factors: None  Associated Signs/Symptoms: drainage and pain    Ulcer Identification:  Ulcer Type: pressure and non-healing/non-surgical  Contributing Factors: lymphedema, chronic pressure, decreased mobility, and shear force    Diabetic/Pressure/Non Pressure Ulcers only:  Ulcer: N/A    If patient has diabetic lower extremity wounds  Zapata Classification of

## 2024-09-20 ENCOUNTER — HOSPITAL ENCOUNTER (OUTPATIENT)
Dept: WOUND CARE | Age: 49
Discharge: HOME | End: 2024-09-20
Attending: EMERGENCY MEDICINE | Admitting: SURGERY
Payer: MEDICARE

## 2024-09-20 VITALS
RESPIRATION RATE: 17 BRPM | WEIGHT: 225 LBS | DIASTOLIC BLOOD PRESSURE: 85 MMHG | SYSTOLIC BLOOD PRESSURE: 151 MMHG | BODY MASS INDEX: 39.87 KG/M2 | HEIGHT: 63 IN | HEART RATE: 90 BPM | TEMPERATURE: 97.5 F

## 2024-09-20 DIAGNOSIS — L97.522 ULCER OF FOOT, LEFT, WITH FAT LAYER EXPOSED (HCC): ICD-10-CM

## 2024-09-20 DIAGNOSIS — Q05.9 DECUBITUS ULCER DUE TO SPINA BIFIDA (HCC): Primary | ICD-10-CM

## 2024-09-20 DIAGNOSIS — L89.90 DECUBITUS ULCER DUE TO SPINA BIFIDA (HCC): Primary | ICD-10-CM

## 2024-09-20 DIAGNOSIS — L89.104 DECUBITUS ULCER OF BACK, STAGE 4 (HCC): ICD-10-CM

## 2024-09-20 PROCEDURE — 86403 PARTICLE AGGLUT ANTBDY SCRN: CPT

## 2024-09-20 PROCEDURE — 11042 DBRDMT SUBQ TIS 1ST 20SQCM/<: CPT | Performed by: NURSE PRACTITIONER

## 2024-09-20 PROCEDURE — 87205 SMEAR GRAM STAIN: CPT

## 2024-09-20 PROCEDURE — 11045 DBRDMT SUBQ TISS EACH ADDL: CPT | Performed by: NURSE PRACTITIONER

## 2024-09-20 PROCEDURE — 87070 CULTURE OTHR SPECIMN AEROBIC: CPT

## 2024-09-20 PROCEDURE — 11045 DBRDMT SUBQ TISS EACH ADDL: CPT

## 2024-09-20 PROCEDURE — 11042 DBRDMT SUBQ TIS 1ST 20SQCM/<: CPT

## 2024-09-20 RX ORDER — TRIAMCINOLONE ACETONIDE 1 MG/G
OINTMENT TOPICAL ONCE
OUTPATIENT
Start: 2024-09-20 | End: 2024-09-20

## 2024-09-20 RX ORDER — GENTAMICIN SULFATE 1 MG/G
OINTMENT TOPICAL ONCE
OUTPATIENT
Start: 2024-09-20 | End: 2024-09-20

## 2024-09-20 RX ORDER — LIDOCAINE 50 MG/G
OINTMENT TOPICAL ONCE
OUTPATIENT
Start: 2024-09-20 | End: 2024-09-20

## 2024-09-20 RX ORDER — GINSENG 100 MG
CAPSULE ORAL ONCE
OUTPATIENT
Start: 2024-09-20 | End: 2024-09-20

## 2024-09-20 RX ORDER — LIDOCAINE HYDROCHLORIDE 40 MG/ML
SOLUTION TOPICAL ONCE
Status: COMPLETED | OUTPATIENT
Start: 2024-09-20 | End: 2024-09-20

## 2024-09-20 RX ORDER — LIDOCAINE HYDROCHLORIDE 40 MG/ML
SOLUTION TOPICAL ONCE
OUTPATIENT
Start: 2024-09-20 | End: 2024-09-20

## 2024-09-20 RX ORDER — MUPIROCIN 20 MG/G
OINTMENT TOPICAL ONCE
OUTPATIENT
Start: 2024-09-20 | End: 2024-09-20

## 2024-09-20 RX ORDER — LIDOCAINE 40 MG/G
CREAM TOPICAL ONCE
OUTPATIENT
Start: 2024-09-20 | End: 2024-09-20

## 2024-09-20 RX ORDER — NEOMYCIN/BACITRACIN/POLYMYXINB 3.5-400-5K
OINTMENT (GRAM) TOPICAL ONCE
OUTPATIENT
Start: 2024-09-20 | End: 2024-09-20

## 2024-09-20 RX ORDER — SODIUM CHLOR/HYPOCHLOROUS ACID 0.033 %
SOLUTION, IRRIGATION IRRIGATION ONCE
OUTPATIENT
Start: 2024-09-20 | End: 2024-09-20

## 2024-09-20 RX ORDER — CLOBETASOL PROPIONATE 0.5 MG/G
OINTMENT TOPICAL ONCE
OUTPATIENT
Start: 2024-09-20 | End: 2024-09-20

## 2024-09-20 RX ORDER — BETAMETHASONE DIPROPIONATE 0.5 MG/G
CREAM TOPICAL ONCE
OUTPATIENT
Start: 2024-09-20 | End: 2024-09-20

## 2024-09-20 RX ORDER — BACITRACIN ZINC AND POLYMYXIN B SULFATE 500; 1000 [USP'U]/G; [USP'U]/G
OINTMENT TOPICAL ONCE
OUTPATIENT
Start: 2024-09-20 | End: 2024-09-20

## 2024-09-20 RX ORDER — LIDOCAINE HYDROCHLORIDE 20 MG/ML
JELLY TOPICAL ONCE
OUTPATIENT
Start: 2024-09-20 | End: 2024-09-20

## 2024-09-20 RX ORDER — SILVER SULFADIAZINE 10 MG/G
CREAM TOPICAL ONCE
OUTPATIENT
Start: 2024-09-20 | End: 2024-09-20

## 2024-09-20 RX ADMIN — LIDOCAINE HYDROCHLORIDE 5 ML: 40 SOLUTION TOPICAL at 13:11

## 2024-10-01 NOTE — DISCHARGE INSTRUCTIONS

## 2024-10-04 ENCOUNTER — HOSPITAL ENCOUNTER (OUTPATIENT)
Dept: WOUND CARE | Age: 49
Discharge: HOME | End: 2024-10-04
Attending: EMERGENCY MEDICINE | Admitting: SURGERY
Payer: MEDICARE

## 2024-10-04 VITALS
TEMPERATURE: 97.9 F | HEIGHT: 63 IN | DIASTOLIC BLOOD PRESSURE: 78 MMHG | HEART RATE: 86 BPM | BODY MASS INDEX: 39.87 KG/M2 | WEIGHT: 225 LBS | RESPIRATION RATE: 18 BRPM | SYSTOLIC BLOOD PRESSURE: 120 MMHG

## 2024-10-04 DIAGNOSIS — L89.90 DECUBITUS ULCER DUE TO SPINA BIFIDA (HCC): ICD-10-CM

## 2024-10-04 DIAGNOSIS — R89.5 POSITIVE CULTURE FINDINGS IN WOUND: Primary | ICD-10-CM

## 2024-10-04 DIAGNOSIS — Q05.9 DECUBITUS ULCER DUE TO SPINA BIFIDA (HCC): ICD-10-CM

## 2024-10-04 DIAGNOSIS — L97.522 ULCER OF FOOT, LEFT, WITH FAT LAYER EXPOSED (HCC): ICD-10-CM

## 2024-10-04 DIAGNOSIS — L89.104 DECUBITUS ULCER OF BACK, STAGE 4 (HCC): ICD-10-CM

## 2024-10-04 PROCEDURE — 11042 DBRDMT SUBQ TIS 1ST 20SQCM/<: CPT

## 2024-10-04 PROCEDURE — 11045 DBRDMT SUBQ TISS EACH ADDL: CPT

## 2024-10-04 RX ORDER — GINSENG 100 MG
CAPSULE ORAL ONCE
OUTPATIENT
Start: 2024-10-04 | End: 2024-10-04

## 2024-10-04 RX ORDER — LIDOCAINE HYDROCHLORIDE 20 MG/ML
JELLY TOPICAL ONCE
OUTPATIENT
Start: 2024-10-04 | End: 2024-10-04

## 2024-10-04 RX ORDER — BETAMETHASONE DIPROPIONATE 0.5 MG/G
CREAM TOPICAL ONCE
OUTPATIENT
Start: 2024-10-04 | End: 2024-10-04

## 2024-10-04 RX ORDER — SODIUM CHLOR/HYPOCHLOROUS ACID 0.033 %
SOLUTION, IRRIGATION IRRIGATION ONCE
OUTPATIENT
Start: 2024-10-04 | End: 2024-10-04

## 2024-10-04 RX ORDER — LIDOCAINE 40 MG/G
CREAM TOPICAL ONCE
OUTPATIENT
Start: 2024-10-04 | End: 2024-10-04

## 2024-10-04 RX ORDER — NEOMYCIN/BACITRACIN/POLYMYXINB 3.5-400-5K
OINTMENT (GRAM) TOPICAL ONCE
OUTPATIENT
Start: 2024-10-04 | End: 2024-10-04

## 2024-10-04 RX ORDER — CLOBETASOL PROPIONATE 0.5 MG/G
OINTMENT TOPICAL ONCE
OUTPATIENT
Start: 2024-10-04 | End: 2024-10-04

## 2024-10-04 RX ORDER — LIDOCAINE HYDROCHLORIDE 40 MG/ML
SOLUTION TOPICAL ONCE
OUTPATIENT
Start: 2024-10-04 | End: 2024-10-04

## 2024-10-04 RX ORDER — LIDOCAINE 50 MG/G
OINTMENT TOPICAL ONCE
OUTPATIENT
Start: 2024-10-04 | End: 2024-10-04

## 2024-10-04 RX ORDER — TRIAMCINOLONE ACETONIDE 1 MG/G
OINTMENT TOPICAL ONCE
OUTPATIENT
Start: 2024-10-04 | End: 2024-10-04

## 2024-10-04 RX ORDER — GENTAMICIN SULFATE 1 MG/G
OINTMENT TOPICAL ONCE
OUTPATIENT
Start: 2024-10-04 | End: 2024-10-04

## 2024-10-04 RX ORDER — LIDOCAINE HYDROCHLORIDE 40 MG/ML
SOLUTION TOPICAL ONCE
Status: COMPLETED | OUTPATIENT
Start: 2024-10-04 | End: 2024-10-04

## 2024-10-04 RX ORDER — BACITRACIN ZINC AND POLYMYXIN B SULFATE 500; 1000 [USP'U]/G; [USP'U]/G
OINTMENT TOPICAL ONCE
OUTPATIENT
Start: 2024-10-04 | End: 2024-10-04

## 2024-10-04 RX ORDER — SILVER SULFADIAZINE 10 MG/G
CREAM TOPICAL ONCE
OUTPATIENT
Start: 2024-10-04 | End: 2024-10-04

## 2024-10-04 RX ORDER — MUPIROCIN 20 MG/G
OINTMENT TOPICAL ONCE
OUTPATIENT
Start: 2024-10-04 | End: 2024-10-04

## 2024-10-04 RX ADMIN — LIDOCAINE HYDROCHLORIDE 5 ML: 40 SOLUTION TOPICAL at 13:35

## 2024-10-04 NOTE — PROGRESS NOTES
and left heel wounds   Right ischial wound reopened   Plantar and back wounds larger   Wheelchair company came out to adjust foot placement x3days ago  Wounds debrided   Continue aquacel and dry dressing to all wounds   5/17/2024  Wounds of the back and plantar surface of the left foot were debrided, looks fairly clean and smaller  The remaining wounds were not debrided    5/31/24  Left heel wound healed   All wounds overall improving, measuring smaller   Wounds debrided   Continue aquacel and dry dressing to all wounds    6/14/2024  Ulcer over the plantar surface left foot and the medial aspect of the right knee, much smaller and improving  Ulcer over the back, stable with some exudate, debrided    6/28/24  Right knee ulcer measuring larger, appearance stabl   Plantar foot wounds improving, measuring smaller   Back wound measuring larger and deeper, appearance worsening, grey/nonblanchable at lower aspect   Wound debrided   Culture obtained   Aquacel ag and dry dressing to all wounds   Patient states [employer]  has modified chair but positioning worse for his back - scheduled to come out to readjust the wheelchair again 7/8/24  Discussed importance of trying to minimize pressure to ulcerated areas, especially back due to worsening of wound     7/26/24  Left heel wound healed   Back wound measuring smaller, appearence improving   Continue Aquacel ag and dry dressing   Pad feet and knee to prevent new ulcer formation   Protect fragile new skin - pat dry when wet     8/9/24  Back wound measuring larger and deeper  Continue Aquacel ag and dry dressing   Continue to pad feet and knee to prevent new ulcer formation   Discussed importance of minimizing pressure to ulcerated areas and repositioning q2h    8/23/24  Back wound measuring larger but depth improved from 1cm to 0.5cm   Appearance improving   Continue to pad feet and knee to prevent new ulcer formation   Santyl and dry dressing to back wound   Continue

## 2024-10-18 ENCOUNTER — HOSPITAL ENCOUNTER (OUTPATIENT)
Dept: WOUND CARE | Age: 49
Discharge: HOME | End: 2024-10-18
Attending: EMERGENCY MEDICINE | Admitting: SURGERY
Payer: MEDICARE

## 2024-10-18 VITALS
RESPIRATION RATE: 18 BRPM | TEMPERATURE: 97.1 F | SYSTOLIC BLOOD PRESSURE: 144 MMHG | HEART RATE: 91 BPM | DIASTOLIC BLOOD PRESSURE: 88 MMHG

## 2024-10-18 DIAGNOSIS — Q05.9 DECUBITUS ULCER DUE TO SPINA BIFIDA (HCC): Primary | ICD-10-CM

## 2024-10-18 DIAGNOSIS — L89.90 DECUBITUS ULCER DUE TO SPINA BIFIDA (HCC): Primary | ICD-10-CM

## 2024-10-18 DIAGNOSIS — L89.104 DECUBITUS ULCER OF BACK, STAGE 4 (HCC): ICD-10-CM

## 2024-10-18 DIAGNOSIS — L97.522 ULCER OF FOOT, LEFT, WITH FAT LAYER EXPOSED (HCC): ICD-10-CM

## 2024-10-18 PROCEDURE — 11042 DBRDMT SUBQ TIS 1ST 20SQCM/<: CPT

## 2024-10-18 PROCEDURE — 11042 DBRDMT SUBQ TIS 1ST 20SQCM/<: CPT | Performed by: NURSE PRACTITIONER

## 2024-10-18 PROCEDURE — 11045 DBRDMT SUBQ TISS EACH ADDL: CPT

## 2024-10-18 PROCEDURE — 11045 DBRDMT SUBQ TISS EACH ADDL: CPT | Performed by: NURSE PRACTITIONER

## 2024-10-18 RX ORDER — BACITRACIN ZINC AND POLYMYXIN B SULFATE 500; 1000 [USP'U]/G; [USP'U]/G
OINTMENT TOPICAL ONCE
OUTPATIENT
Start: 2024-10-18 | End: 2024-10-18

## 2024-10-18 RX ORDER — BETAMETHASONE DIPROPIONATE 0.5 MG/G
CREAM TOPICAL ONCE
OUTPATIENT
Start: 2024-10-18 | End: 2024-10-18

## 2024-10-18 RX ORDER — GENTAMICIN SULFATE 1 MG/G
OINTMENT TOPICAL ONCE
OUTPATIENT
Start: 2024-10-18 | End: 2024-10-18

## 2024-10-18 RX ORDER — LIDOCAINE HYDROCHLORIDE 40 MG/ML
SOLUTION TOPICAL ONCE
OUTPATIENT
Start: 2024-10-18 | End: 2024-10-18

## 2024-10-18 RX ORDER — LIDOCAINE 40 MG/G
CREAM TOPICAL ONCE
OUTPATIENT
Start: 2024-10-18 | End: 2024-10-18

## 2024-10-18 RX ORDER — NEOMYCIN/BACITRACIN/POLYMYXINB 3.5-400-5K
OINTMENT (GRAM) TOPICAL ONCE
OUTPATIENT
Start: 2024-10-18 | End: 2024-10-18

## 2024-10-18 RX ORDER — CLOBETASOL PROPIONATE 0.5 MG/G
OINTMENT TOPICAL ONCE
OUTPATIENT
Start: 2024-10-18 | End: 2024-10-18

## 2024-10-18 RX ORDER — LIDOCAINE HYDROCHLORIDE 40 MG/ML
SOLUTION TOPICAL ONCE
Status: COMPLETED | OUTPATIENT
Start: 2024-10-18 | End: 2024-10-18

## 2024-10-18 RX ORDER — GINSENG 100 MG
CAPSULE ORAL ONCE
OUTPATIENT
Start: 2024-10-18 | End: 2024-10-18

## 2024-10-18 RX ORDER — MUPIROCIN 20 MG/G
OINTMENT TOPICAL ONCE
OUTPATIENT
Start: 2024-10-18 | End: 2024-10-18

## 2024-10-18 RX ORDER — LIDOCAINE HYDROCHLORIDE 20 MG/ML
JELLY TOPICAL ONCE
OUTPATIENT
Start: 2024-10-18 | End: 2024-10-18

## 2024-10-18 RX ORDER — LIDOCAINE 50 MG/G
OINTMENT TOPICAL ONCE
OUTPATIENT
Start: 2024-10-18 | End: 2024-10-18

## 2024-10-18 RX ORDER — SILVER SULFADIAZINE 10 MG/G
CREAM TOPICAL ONCE
OUTPATIENT
Start: 2024-10-18 | End: 2024-10-18

## 2024-10-18 RX ORDER — TRIAMCINOLONE ACETONIDE 1 MG/G
OINTMENT TOPICAL ONCE
OUTPATIENT
Start: 2024-10-18 | End: 2024-10-18

## 2024-10-18 RX ORDER — SODIUM CHLOR/HYPOCHLOROUS ACID 0.033 %
SOLUTION, IRRIGATION IRRIGATION ONCE
OUTPATIENT
Start: 2024-10-18 | End: 2024-10-18

## 2024-10-18 RX ADMIN — LIDOCAINE HYDROCHLORIDE: 40 SOLUTION TOPICAL at 13:53

## 2024-10-18 NOTE — PROGRESS NOTES
Wound Healing Center /Hyperbarics   History and Physical/Consultation  Vascular    Referring Physician : Massimo Yoon MD  Thai Church  MEDICAL RECORD NUMBER:  [id number]  AGE: 49 y.o.   GENDER: male  : 1975  EPISODE DATE:  10/18/2024  Subjective:     Chief Complaint   Patient presents with    Wound Check         HISTORY of PRESENT ILLNESS HPI     Thai Church is a 49 y.o. male who presents today for wound/ulcer evaluation.   History of Wound Context:  The patient has had a wounds of both feet, right ischium, back pressure ulcers between the junction of the lumbar spine and thoracic spine which was first noted approximately at least 3 to 4 months, patient and his mother tell me that they noticed that when the patient got home from a extended care facility but they do not recall how long he had ulcers,.  This has been treated at the [place] wound care center and family came to [facility], and in the past he was treated here with good results.  On their initial visit to the wound healing center, 23, the patient has noted that the wound has not been improving.  The patient has had similar previous wounds in the past.        Patient has spina bifida with hydrocephalus, has undergone previous surgeries multiple including peritoneal venous shunt for hydrocephalus in the [place], has paraplegia, does not ambulate, recently underwent insertion of suprapubic catheter    Pt is currently not on abx.      Wound/Ulcer Pain Timing/Severity: constant  Quality of pain: dull, aching  Severity:  3 / 10   Modifying Factors: None  Associated Signs/Symptoms: drainage and pain    Ulcer Identification:  Ulcer Type: pressure and non-healing/non-surgical  Contributing Factors: lymphedema, chronic pressure, decreased mobility, and shear force    Diabetic/Pressure/Non Pressure Ulcers only:  Ulcer: N/A    If patient has diabetic lower extremity wounds  Zapata Classification of diabetic lower extremity

## 2024-10-18 NOTE — PLAN OF CARE
Problem: Cognitive:  Goal: Knowledge of wound care  Description: Knowledge of wound care  Outcome: Progressing     Problem: Pressure Ulcer:  Goal: Signs of wound healing will improve  Description: Signs of wound healing will improve  Outcome: Progressing

## 2024-10-18 NOTE — DISCHARGE INSTRUCTIONS

## 2024-11-01 ENCOUNTER — HOSPITAL ENCOUNTER (OUTPATIENT)
Dept: WOUND CARE | Age: 49
Discharge: HOME | End: 2024-11-01
Attending: EMERGENCY MEDICINE | Admitting: SURGERY

## 2024-11-15 ENCOUNTER — HOSPITAL ENCOUNTER (OUTPATIENT)
Dept: WOUND CARE | Age: 49
Discharge: HOME | End: 2024-11-15
Attending: EMERGENCY MEDICINE | Admitting: SURGERY

## 2024-12-06 ENCOUNTER — HOSPITAL ENCOUNTER (OUTPATIENT)
Dept: WOUND CARE | Age: 49
Discharge: HOME | End: 2024-12-06
Attending: EMERGENCY MEDICINE | Admitting: SURGERY
Payer: MEDICARE

## 2024-12-06 VITALS
SYSTOLIC BLOOD PRESSURE: 156 MMHG | TEMPERATURE: 96.2 F | HEART RATE: 81 BPM | RESPIRATION RATE: 18 BRPM | DIASTOLIC BLOOD PRESSURE: 88 MMHG

## 2024-12-06 PROCEDURE — 11042 DBRDMT SUBQ TIS 1ST 20SQCM/<: CPT

## 2024-12-06 PROCEDURE — 87205 SMEAR GRAM STAIN: CPT

## 2024-12-06 PROCEDURE — 87070 CULTURE OTHR SPECIMN AEROBIC: CPT

## 2024-12-06 PROCEDURE — 86403 PARTICLE AGGLUT ANTBDY SCRN: CPT

## 2024-12-06 PROCEDURE — 11045 DBRDMT SUBQ TISS EACH ADDL: CPT

## 2024-12-06 NOTE — PLAN OF CARE
Problem: Cognitive:  Goal: Knowledge of wound care  Description: Knowledge of wound care  Outcome: Progressing  Goal: Understands risk factors for wounds  Description: Understands risk factors for wounds  Outcome: Progressing     Problem: Pressure Ulcer:  Goal: Signs of wound healing will improve  Description: Signs of wound healing will improve  Outcome: Progressing  Goal: Absence of new pressure ulcer  Description: Absence of new pressure ulcer  Outcome: Progressing  Goal: Will show no infection signs and symptoms  Description: Will show no infection signs and symptoms  Outcome: Progressing     Problem: Wound:  Goal: Will show signs of wound healing; wound closure and no evidence of infection  Description: Will show signs of wound healing; wound closure and no evidence of infection  Outcome: Progressing

## 2024-12-06 NOTE — PROGRESS NOTES
Wound Healing Center /Hyperbarics   History and Physical/Consultation  Vascular    Referring Physician : Massimo Yoon MD  Thai Church  MEDICAL RECORD NUMBER:  [id number]  AGE: 49 y.o.   GENDER: male  : 1975  EPISODE DATE:  2024  Subjective:     Chief Complaint   Patient presents with    Wound Check         HISTORY of PRESENT ILLNESS HPI     Thai Church is a 49 y.o. male who presents today for wound/ulcer evaluation.   History of Wound Context:  The patient has had a wounds of both feet, right ischium, back pressure ulcers between the junction of the lumbar spine and thoracic spine which was first noted approximately at least 3 to 4 months, patient and his mother tell me that they noticed that when the patient got home from a extended care facility but they do not recall how long he had ulcers,.  This has been treated at the [place] wound care center and family came to [facility], and in the past he was treated here with good results.  On their initial visit to the wound healing center, 23, the patient has noted that the wound has not been improving.  The patient has had similar previous wounds in the past.        Patient has spina bifida with hydrocephalus, has undergone previous surgeries multiple including peritoneal venous shunt for hydrocephalus in the [place], has paraplegia, does not ambulate, recently underwent insertion of suprapubic catheter    Pt is currently not on abx.      Wound/Ulcer Pain Timing/Severity: constant  Quality of pain: dull, aching  Severity:  3 / 10   Modifying Factors: None  Associated Signs/Symptoms: drainage and pain    Ulcer Identification:  Ulcer Type: pressure and non-healing/non-surgical  Contributing Factors: lymphedema, chronic pressure, decreased mobility, and shear force    Diabetic/Pressure/Non Pressure Ulcers only:  Ulcer: N/A    If patient has diabetic lower extremity wounds  Zapata Classification of diabetic lower extremity

## 2024-12-06 NOTE — DISCHARGE INSTRUCTIONS

## 2024-12-08 LAB
MICROORGANISM SPEC CULT: ABNORMAL
MICROORGANISM/AGENT SPEC: ABNORMAL
SERVICE CMNT-IMP: ABNORMAL
SPECIMEN DESCRIPTION: ABNORMAL

## 2024-12-20 ENCOUNTER — HOSPITAL ENCOUNTER (OUTPATIENT)
Dept: WOUND CARE | Age: 49
Discharge: HOME | End: 2024-12-20
Attending: EMERGENCY MEDICINE | Admitting: SURGERY
Payer: MEDICARE

## 2024-12-20 VITALS
DIASTOLIC BLOOD PRESSURE: 77 MMHG | RESPIRATION RATE: 17 BRPM | BODY MASS INDEX: 38.09 KG/M2 | TEMPERATURE: 97.7 F | HEART RATE: 90 BPM | WEIGHT: 215 LBS | SYSTOLIC BLOOD PRESSURE: 129 MMHG | HEIGHT: 63 IN

## 2024-12-20 DIAGNOSIS — L97.512 ULCER OF FOOT, RIGHT, WITH FAT LAYER EXPOSED (HCC): ICD-10-CM

## 2024-12-20 DIAGNOSIS — L89.313 DECUBITUS ULCER OF ISCHIAL AREA, RIGHT, STAGE III (HCC): ICD-10-CM

## 2024-12-20 DIAGNOSIS — L89.90 DECUBITUS ULCER DUE TO SPINA BIFIDA (HCC): Primary | ICD-10-CM

## 2024-12-20 DIAGNOSIS — L89.104 DECUBITUS ULCER OF BACK, STAGE 4 (HCC): ICD-10-CM

## 2024-12-20 DIAGNOSIS — L89.103 DECUBITUS ULCER OF BACK, STAGE 3 (HCC): ICD-10-CM

## 2024-12-20 DIAGNOSIS — Q05.9 DECUBITUS ULCER DUE TO SPINA BIFIDA (HCC): Primary | ICD-10-CM

## 2024-12-20 DIAGNOSIS — L97.522 ULCER OF FOOT, LEFT, WITH FAT LAYER EXPOSED (HCC): ICD-10-CM

## 2024-12-20 PROCEDURE — 11042 DBRDMT SUBQ TIS 1ST 20SQCM/<: CPT

## 2024-12-20 PROCEDURE — 11045 DBRDMT SUBQ TISS EACH ADDL: CPT

## 2024-12-20 RX ORDER — DOXYCYCLINE HYCLATE 100 MG
100 TABLET ORAL 2 TIMES DAILY
Qty: 20 TABLET | Refills: 0 | Status: SHIPPED | OUTPATIENT
Start: 2024-12-20 | End: 2024-12-30

## 2024-12-20 NOTE — PROGRESS NOTES
cleanse bilateral ischium with Dakin's or Vashe  Dakin's (or Vashe) packing to bilateral ischium  Plain aquacel to all other wounds   Patient general malaise without fever at today's visit    2/9/24  Left plantar foot wound discolored  but measuring slightly smaller   Wounds debrided   Bilateral ischial wound measuring smaller, appearance continues to improve  Back wound appearances continue to improve, measuring smaller   Plain aquacel and dry dressing to all other wounds     2/23/24  Left ischium wound healed   Left plantar foot wound appearance improving, measuring slightly smaller   Right ischium and back wounds continue  to improve, measuring smaller   Wounds debrided   Plain aquacel and dry dressing to all wounds     3/8/24  Left distal plantar appearance improving but measuring larger and deeper  Left midfoot plantar foot wound appearance improving, measuring smaller   Right ischium and back wounds continue  to improve, measuring smaller   Wounds debrided   Distal plantar wound cultures obtained   Continue Plain aquacel and dry dressing to all wounds     3/22/24  Left foot plantar wounds appearances stable but measuring larger and deeper  Right ischium and back wounds continue  to improve, measuring smaller   Wounds debrided   Doxycycline 100mg BID [id number] prescribed   Gentamicin ointment prescribed (patient has allergy but states he tolerates topically)  Continue Plain aquacel and dry dressing to all wounds   Discussed trying to add a pillow to foot plate of wheelchair to prevent pressure     4/5/2024  Ulcers, over the thoracic spine area looks clean, ischial ulcer healing well, patient has 2 wounds, right left plantar area, the right appears to be new, last 1 week, looks fairly clean    4/19/24  Plantar and back wounds measuring larger   Wheelchair company to come out to adjust foot placement   Wounds debrided   Continue aquacel and dry dressing to all wounds     5/3/24  New right knee and left heel wounds

## 2024-12-20 NOTE — DISCHARGE INSTRUCTIONS

## 2024-12-31 NOTE — DISCHARGE INSTRUCTIONS
Visit Discharge/Physician Orders     Discharge condition: Stable     Assessment of pain at discharge: MILD     Anesthetic used: 4% LIDOCAINE     Discharge to: Home     Left via:Private automobile     Accompanied by: accompanied by mother     ECF/HHA:  [facility]      Dressing Orders: TO BACK CLEAN WITH SALINE APPLY SANTYL, super absorber  DRY DRESSING, CHANGE DAILY     May pad wounds with foam for extra offloading      Begin to wear spandigrips bilaterally during the day, may remove at night.     Treatment Orders: Follow a nutritious diet. Choose foods high in protein: chicken, fish, and eggs. Choose foods high in Vitamin C. Multivitamin daily unless contraindicated. Offload back as often as possible. Try to change position every 2 hours. Offload feet with eggcrate cushions.        [facility] followup visit _______2 weeks _____________  (Please note your next appointment above and if you are unable to keep, kindly give a 24 hour notice. Thank you.)     Physician signature:__________________________        If you experience any of the following, please call the Wound Care Center during business hours:     * Increase in Pain  * Temperature over 101  * Increase in drainage from your wound  * Drainage with a foul odor  * Bleeding  * Increase in swelling  * Need for compression bandage changes due to slippage, breakthrough drainage.     If you need medical attention outside of the business hours of the Wound Care Centers please contact your PCP or go to the nearest emergency room.

## 2025-01-03 ENCOUNTER — HOSPITAL ENCOUNTER (OUTPATIENT)
Dept: WOUND CARE | Age: 50
Discharge: HOME | End: 2025-01-03
Attending: EMERGENCY MEDICINE | Admitting: SURGERY
Payer: MEDICARE

## 2025-01-03 VITALS
TEMPERATURE: 97 F | WEIGHT: 215 LBS | SYSTOLIC BLOOD PRESSURE: 143 MMHG | BODY MASS INDEX: 38.09 KG/M2 | RESPIRATION RATE: 18 BRPM | HEART RATE: 84 BPM | HEIGHT: 63 IN | DIASTOLIC BLOOD PRESSURE: 83 MMHG

## 2025-01-03 DIAGNOSIS — L89.103 DECUBITUS ULCER OF BACK, STAGE 3 (HCC): ICD-10-CM

## 2025-01-03 DIAGNOSIS — Q05.9 DECUBITUS ULCER DUE TO SPINA BIFIDA (HCC): Primary | ICD-10-CM

## 2025-01-03 DIAGNOSIS — L97.522 ULCER OF FOOT, LEFT, WITH FAT LAYER EXPOSED (HCC): ICD-10-CM

## 2025-01-03 DIAGNOSIS — L89.90 DECUBITUS ULCER DUE TO SPINA BIFIDA (HCC): Primary | ICD-10-CM

## 2025-01-03 PROCEDURE — 11042 DBRDMT SUBQ TIS 1ST 20SQCM/<: CPT

## 2025-01-03 PROCEDURE — 11045 DBRDMT SUBQ TISS EACH ADDL: CPT

## 2025-01-03 RX ORDER — VIBEGRON 75 MG/1
75 TABLET, FILM COATED ORAL DAILY
COMMUNITY

## 2025-01-03 NOTE — PROGRESS NOTES
Number of days: 119        Procedure Note  Indications:  Based on my examination of this patient's wound(s)/ulcer(s) today, debridement is required to promote healing and evaluate the wound base.    Performed by: LENNY Lal CNP    Consent obtained:  Yes    Time out taken:  Yes    Pain Control: Anesthetic  Anesthetic: 4% Lidocaine Liquid Topical     Debridement:Excisional Debridement    Using curette the wound(s)/ulcer(s) was/were sharply debrided down through and including the removal of subcutaneous tissue.        Devitalized Tissue Debrided:  fibrin, biofilm, slough, and exudate    Wound/Ulcer #: 1     Percent of Wound/Ulcer Debrided: 90%    Total Surface Area Debrided:  60 sq cm     Estimated Blood Loss:  Minimal    Hemostasis Achieved:  by pressure    Procedural Pain:  0  / 10     Post Procedural Pain:  0 / 10     Response to treatment:  Well tolerated by patient.     A culture was not done.      Plan:     Pt is not a smoker   In my professional opinion and based off the information that is available at this time this patient has appropriate indication for HBO Therapy: No    Treatment Note please see attached Discharge Instructions    Written patient dismissal instructions given to patient and signed by patient or POA.         Discharge Instructions         Visit Discharge/Physician Orders     Discharge condition: Stable     Assessment of pain at discharge: MILD     Anesthetic used: 4% LIDOCAINE     Discharge to: Home     Left via:Private automobile     Accompanied by: accompanied by mother     ECF/HHA:  [facility]      Dressing Orders: TO BACK CLEAN WITH SALINE APPLY SANTYL, super absorber  DRY DRESSING, CHANGE DAILY     May pad wounds with foam for extra offloading      Begin to wear spandigrips bilaterally during the day, may remove at night.     Treatment Orders: Follow a nutritious diet. Choose foods high in protein: chicken, fish, and eggs. Choose foods high in Vitamin C. Multivitamin

## 2025-01-13 NOTE — DISCHARGE INSTRUCTIONS
Visit Discharge/Physician Orders     Discharge condition: Stable     Assessment of pain at discharge: MILD     Anesthetic used: 4% LIDOCAINE     Discharge to: Home     Left via:Private automobile     Accompanied by: accompanied by mother     ECF/HHA:  [facility]      Dressing Orders: TO BACK CLEAN WITH DAKINS SOLUTION, APPLY DAKINS PACKING, DRY DRESSING. CHANGE DAILY      May pad wounds with foam for extra offloading         Treatment Orders: Follow a nutritious diet. Choose foods high in protein: chicken, fish, and eggs. Choose foods high in Vitamin C. Multivitamin daily unless contraindicated. Offload back as often as possible. Try to change position every 2 hours. Offload feet with eggcrate cushions.        [facility] followup visit _______1 WEEK___________  (Please note your next appointment above and if you are unable to keep, kindly give a 24 hour notice. Thank you.)     Physician signature:__________________________        If you experience any of the following, please call the Wound Care Center during business hours:     * Increase in Pain  * Temperature over 101  * Increase in drainage from your wound  * Drainage with a foul odor  * Bleeding  * Increase in swelling  * Need for compression bandage changes due to slippage, breakthrough drainage.     If you need medical attention outside of the business hours of the Wound Care Centers please contact your PCP or go to the nearest emergency room.

## 2025-01-17 ENCOUNTER — HOSPITAL ENCOUNTER (OUTPATIENT)
Dept: WOUND CARE | Age: 50
Discharge: HOME | End: 2025-01-17
Attending: EMERGENCY MEDICINE | Admitting: SURGERY
Payer: MEDICARE

## 2025-01-17 VITALS
DIASTOLIC BLOOD PRESSURE: 74 MMHG | HEART RATE: 86 BPM | RESPIRATION RATE: 16 BRPM | SYSTOLIC BLOOD PRESSURE: 130 MMHG | TEMPERATURE: 97.3 F

## 2025-01-17 DIAGNOSIS — L89.104 DECUBITUS ULCER OF BACK, STAGE 4 (HCC): Primary | ICD-10-CM

## 2025-01-17 PROCEDURE — 87070 CULTURE OTHR SPECIMN AEROBIC: CPT

## 2025-01-17 PROCEDURE — 87077 CULTURE AEROBIC IDENTIFY: CPT

## 2025-01-17 PROCEDURE — 11045 DBRDMT SUBQ TISS EACH ADDL: CPT

## 2025-01-17 PROCEDURE — 11042 DBRDMT SUBQ TIS 1ST 20SQCM/<: CPT

## 2025-01-17 PROCEDURE — 87205 SMEAR GRAM STAIN: CPT

## 2025-01-17 RX ORDER — LIDOCAINE HYDROCHLORIDE 40 MG/ML
SOLUTION TOPICAL ONCE
Status: DISCONTINUED | OUTPATIENT
Start: 2025-01-17 | End: 2025-01-18 | Stop reason: HOSPADM

## 2025-01-17 NOTE — PROGRESS NOTES
Wound Healing Center /Hyperbarics   History and Physical/Consultation  Vascular    Referring Physician : Massimo Yoon MD  Thai Church  MEDICAL RECORD NUMBER:  [id number]  AGE: 49 y.o.   GENDER: male  : 1975  EPISODE DATE:  2025  Subjective:     Chief Complaint   Patient presents with    Wound Check     \"I'm here for my back wound check\"         HISTORY of PRESENT ILLNESS HPI     Thai Church is a 49 y.o. male who presents today for wound/ulcer evaluation.   History of Wound Context:  The patient has had a wounds of both feet, right ischium, back pressure ulcers between the junction of the lumbar spine and thoracic spine which was first noted approximately at least 3 to 4 months, patient and his mother tell me that they noticed that when the patient got home from a extended care facility but they do not recall how long he had ulcers,.  This has been treated at the [place] wound care center and family came to [facility], and in the past he was treated here with good results.  On their initial visit to the wound healing center, 23, the patient has noted that the wound has not been improving.  The patient has had similar previous wounds in the past.        Patient has spina bifida with hydrocephalus, has undergone previous surgeries multiple including peritoneal venous shunt for hydrocephalus in the [place], has paraplegia, does not ambulate, recently underwent insertion of suprapubic catheter    Pt is currently not on abx.      Wound/Ulcer Pain Timing/Severity: constant  Quality of pain: dull, aching  Severity:  3 / 10   Modifying Factors: None  Associated Signs/Symptoms: drainage and pain    Ulcer Identification:  Ulcer Type: pressure and non-healing/non-surgical  Contributing Factors: lymphedema, chronic pressure, decreased mobility, and shear force    Diabetic/Pressure/Non Pressure Ulcers only:  Ulcer: N/A    If patient has diabetic lower extremity wounds  [place]ata

## 2025-01-27 ENCOUNTER — HOSPITAL ENCOUNTER (OUTPATIENT)
Dept: WOUND CARE | Age: 50
Discharge: HOME | End: 2025-01-27
Attending: EMERGENCY MEDICINE | Admitting: SURGERY
Payer: MEDICARE

## 2025-01-27 VITALS
DIASTOLIC BLOOD PRESSURE: 88 MMHG | TEMPERATURE: 97.4 F | RESPIRATION RATE: 18 BRPM | HEART RATE: 87 BPM | SYSTOLIC BLOOD PRESSURE: 145 MMHG

## 2025-01-27 DIAGNOSIS — R89.5 POSITIVE CULTURE FINDINGS IN WOUND: Primary | ICD-10-CM

## 2025-01-27 DIAGNOSIS — L89.104 DECUBITUS ULCER OF BACK, STAGE 4 (HCC): ICD-10-CM

## 2025-01-27 PROCEDURE — 11045 DBRDMT SUBQ TISS EACH ADDL: CPT | Performed by: NURSE PRACTITIONER

## 2025-01-27 PROCEDURE — 11042 DBRDMT SUBQ TIS 1ST 20SQCM/<: CPT

## 2025-01-27 PROCEDURE — 11042 DBRDMT SUBQ TIS 1ST 20SQCM/<: CPT | Performed by: NURSE PRACTITIONER

## 2025-01-27 PROCEDURE — 11045 DBRDMT SUBQ TISS EACH ADDL: CPT

## 2025-01-27 RX ORDER — DOXYCYCLINE HYCLATE 100 MG
100 TABLET ORAL 2 TIMES DAILY
Qty: 20 TABLET | Refills: 0 | Status: SHIPPED | OUTPATIENT
Start: 2025-01-27 | End: 2025-02-06

## 2025-01-27 NOTE — DISCHARGE INSTRUCTIONS
Visit Discharge/Physician Orders     Discharge condition: Stable     Assessment of pain at discharge: MILD     Anesthetic used: 4% LIDOCAINE     Discharge to: Home     Left via:Private automobile     Accompanied by: accompanied by mother     ECF/HHA:  [facility]      Dressing Orders: TO BACK CLEAN WITH DAKINS SOLUTION, APPLY DAKINS PACKING, DRY DRESSING. CHANGE DAILY      May pad wounds with foam for extra offloading         Treatment Orders: Follow a nutritious diet. Choose foods high in protein: chicken, fish, and eggs. Choose foods high in Vitamin C. Multivitamin daily unless contraindicated. Offload back as often as possible. Try to change position every 2 hours. Offload feet with eggcrate cushions.    1/27 reviewed wound culture- antibiotics sent to pharmacy Doxycycline.           [facility] followup visit _______1 WEEK Friday with Dr. Brown  ___________  (Please note your next appointment above and if you are unable to keep, kindly give a 24 hour notice. Thank you.)     Physician signature:__________________________        If you experience any of the following, please call the Wound Care Center during business hours:     * Increase in Pain  * Temperature over 101  * Increase in drainage from your wound  * Drainage with a foul odor  * Bleeding  * Increase in swelling  * Need for compression bandage changes due to slippage, breakthrough drainage.     If you need medical attention outside of the business hours of the Wound Care Centers please contact your PCP or go to the nearest emergency room.

## 2025-01-27 NOTE — PROGRESS NOTES
Wound Healing Center /Hyperbarics   History and Physical/Consultation  Vascular    Referring Physician : Massimo Yoon MD  Thai Church  MEDICAL RECORD NUMBER:  [id number]  AGE: 49 y.o.   GENDER: male  : 1975  EPISODE DATE:  2025  Subjective:     Chief Complaint   Patient presents with    Wound Check         HISTORY of PRESENT ILLNESS HPI     Thai Church is a 49 y.o. male who presents today for wound/ulcer evaluation.   History of Wound Context:  The patient has had a wounds of both feet, right ischium, back pressure ulcers between the junction of the lumbar spine and thoracic spine which was first noted approximately at least 3 to 4 months, patient and his mother tell me that they noticed that when the patient got home from a extended care facility but they do not recall how long he had ulcers,.  This has been treated at the [place] wound care center and family came to [facility], and in the past he was treated here with good results.  On their initial visit to the wound healing center, 23, the patient has noted that the wound has not been improving.  The patient has had similar previous wounds in the past.        Patient has spina bifida with hydrocephalus, has undergone previous surgeries multiple including peritoneal venous shunt for hydrocephalus in the [place], has paraplegia, does not ambulate, recently underwent insertion of suprapubic catheter    Pt is currently not on abx.      Wound/Ulcer Pain Timing/Severity: constant  Quality of pain: dull, aching  Severity:  3 / 10   Modifying Factors: None  Associated Signs/Symptoms: drainage and pain    Ulcer Identification:  Ulcer Type: pressure and non-healing/non-surgical  Contributing Factors: lymphedema, chronic pressure, decreased mobility, and shear force    Diabetic/Pressure/Non Pressure Ulcers only:  Ulcer: N/A    If patient has diabetic lower extremity wounds  Zapata Classification of diabetic lower extremity

## 2025-01-28 ENCOUNTER — TELEPHONE (OUTPATIENT)
Dept: SURGERY | Age: 50
End: 2025-01-28

## 2025-02-05 NOTE — DISCHARGE INSTRUCTIONS
Visit Discharge/Physician Orders     Discharge condition: Stable     Assessment of pain at discharge: MILD     Anesthetic used: 4% LIDOCAINE     Discharge to: Home     Left via:Private automobile     Accompanied by: accompanied by mother     ECF/HHA:  [facility]      Dressing Orders: TO BACK CLEAN WITH DAKINS SOLUTION, APPLY SANTYL TO DARK ESCHAR ONLY, APPLY DAKINS SOAKED GAUZE TO REMAINDER OF WOUND BED, DRY DRESSING. CHANGE DAILY      May pad wounds with foam for extra offloading         Treatment Orders: Follow a nutritious diet. Choose foods high in protein: chicken, fish, and eggs. Choose foods high in Vitamin C. Multivitamin daily unless contraindicated. Offload back as often as possible. Try to change position every 2 hours. Offload feet with eggcrate cushions.             [facility] followup visit _______1 WEEK  ___________  (Please note your next appointment above and if you are unable to keep, kindly give a 24 hour notice. Thank you.)     Physician signature:__________________________        If you experience any of the following, please call the Wound Care Center during business hours:     * Increase in Pain  * Temperature over 101  * Increase in drainage from your wound  * Drainage with a foul odor  * Bleeding  * Increase in swelling  * Need for compression bandage changes due to slippage, breakthrough drainage.     If you need medical attention outside of the business hours of the Wound Care Centers please contact your PCP or go to the nearest emergency room.

## 2025-02-07 ENCOUNTER — HOSPITAL ENCOUNTER (OUTPATIENT)
Dept: WOUND CARE | Age: 50
Discharge: HOME | End: 2025-02-07
Attending: EMERGENCY MEDICINE | Admitting: SURGERY
Payer: MEDICARE

## 2025-02-07 VITALS
HEART RATE: 95 BPM | RESPIRATION RATE: 18 BRPM | BODY MASS INDEX: 37.21 KG/M2 | WEIGHT: 210 LBS | DIASTOLIC BLOOD PRESSURE: 89 MMHG | TEMPERATURE: 98.2 F | HEIGHT: 63 IN | SYSTOLIC BLOOD PRESSURE: 159 MMHG

## 2025-02-07 DIAGNOSIS — L97.522 ULCER OF FOOT, LEFT, WITH FAT LAYER EXPOSED (HCC): ICD-10-CM

## 2025-02-07 DIAGNOSIS — L89.90 DECUBITUS ULCER DUE TO SPINA BIFIDA (HCC): Primary | ICD-10-CM

## 2025-02-07 DIAGNOSIS — L89.313 DECUBITUS ULCER OF ISCHIAL AREA, RIGHT, STAGE III (HCC): ICD-10-CM

## 2025-02-07 DIAGNOSIS — Q05.9 DECUBITUS ULCER DUE TO SPINA BIFIDA (HCC): Primary | ICD-10-CM

## 2025-02-07 DIAGNOSIS — L97.512 ULCER OF FOOT, RIGHT, WITH FAT LAYER EXPOSED (HCC): ICD-10-CM

## 2025-02-07 DIAGNOSIS — L89.103 DECUBITUS ULCER OF BACK, STAGE 3 (HCC): ICD-10-CM

## 2025-02-07 PROCEDURE — 11042 DBRDMT SUBQ TIS 1ST 20SQCM/<: CPT

## 2025-02-07 PROCEDURE — 11045 DBRDMT SUBQ TISS EACH ADDL: CPT | Performed by: SURGERY

## 2025-02-07 PROCEDURE — 11042 DBRDMT SUBQ TIS 1ST 20SQCM/<: CPT | Performed by: SURGERY

## 2025-02-07 PROCEDURE — 11045 DBRDMT SUBQ TISS EACH ADDL: CPT

## 2025-02-07 RX ORDER — LIDOCAINE HYDROCHLORIDE 40 MG/ML
SOLUTION TOPICAL ONCE
Status: COMPLETED | OUTPATIENT
Start: 2025-02-07 | End: 2025-02-07

## 2025-02-07 RX ADMIN — LIDOCAINE HYDROCHLORIDE 10 ML: 40 SOLUTION TOPICAL at 13:14

## 2025-02-07 NOTE — PROGRESS NOTES
Wound Healing Center /Hyperbarics   History and Physical/Consultation  Vascular    Referring Physician : Massimo Yoon MD  Thai Church  MEDICAL RECORD NUMBER:  [id number]  AGE: 49 y.o.   GENDER: male  : 1975  EPISODE DATE:  2025  Subjective:     Chief Complaint   Patient presents with    Wound Check         HISTORY of PRESENT ILLNESS HPI     Thai Church is a 49 y.o. male who presents today for wound/ulcer evaluation.   History of Wound Context:  The patient has had a wounds of both feet, right ischium, back pressure ulcers between the junction of the lumbar spine and thoracic spine which was first noted approximately at least 3 to 4 months, patient and his mother tell me that they noticed that when the patient got home from a extended care facility but they do not recall how long he had ulcers,.  This has been treated at the [place] wound care center and family came to [facility], and in the past he was treated here with good results.  On their initial visit to the wound healing center, 23, the patient has noted that the wound has not been improving.  The patient has had similar previous wounds in the past.        Patient has spina bifida with hydrocephalus, has undergone previous surgeries multiple including peritoneal venous shunt for hydrocephalus in the [place], has paraplegia, does not ambulate, recently underwent insertion of suprapubic catheter    Pt is currently not on abx.      Wound/Ulcer Pain Timing/Severity: constant  Quality of pain: dull, aching  Severity:  3 / 10   Modifying Factors: None  Associated Signs/Symptoms: drainage and pain    Ulcer Identification:  Ulcer Type: pressure and non-healing/non-surgical  Contributing Factors: lymphedema, chronic pressure, decreased mobility, and shear force    Diabetic/Pressure/Non Pressure Ulcers only:  Ulcer: N/A    If patient has diabetic lower extremity wounds  Z[place]ata Classification of diabetic lower extremity wounds:

## 2025-02-10 NOTE — DISCHARGE INSTRUCTIONS
Visit Discharge/Physician Orders     Discharge condition: Stable     Assessment of pain at discharge: MILD     Anesthetic used: 4% LIDOCAINE     Discharge to: Home     Left via:Private automobile     Accompanied by: accompanied by mother     ECF/HHA:  [facility]      Dressing Orders: TO BACK CLEAN WITH DAKINS SOLUTION, APPLY SANTYL TO DARK ESCHAR ONLY, APPLY DAKINS SOAKED GAUZE TO REMAINDER OF WOUND BED, DRY DRESSING. CHANGE DAILY      May pad wounds with foam for extra offloading         Treatment Orders: Follow a nutritious diet. Choose foods high in protein: chicken, fish, and eggs. Choose foods high in Vitamin C. Multivitamin daily unless contraindicated. Offload back as often as possible. Try to change position every 2 hours. Offload feet with eggcrate cushions.              [facility] followup visit _______2 WEEK  ___________  (Please note your next appointment above and if you are unable to keep, kindly give a 24 hour notice. Thank you.)     Physician signature:__________________________        If you experience any of the following, please call the Wound Care Center during business hours:     * Increase in Pain  * Temperature over 101  * Increase in drainage from your wound  * Drainage with a foul odor  * Bleeding  * Increase in swelling  * Need for compression bandage changes due to slippage, breakthrough drainage.     If you need medical attention outside of the business hours of the Wound Care Centers please contact your PCP or go to the nearest emergency room.

## 2025-02-14 ENCOUNTER — HOSPITAL ENCOUNTER (OUTPATIENT)
Dept: WOUND CARE | Age: 50
Discharge: HOME | End: 2025-02-14
Attending: EMERGENCY MEDICINE | Admitting: SURGERY
Payer: MEDICARE

## 2025-02-14 VITALS
RESPIRATION RATE: 16 BRPM | HEIGHT: 62 IN | TEMPERATURE: 98 F | DIASTOLIC BLOOD PRESSURE: 79 MMHG | BODY MASS INDEX: 38.64 KG/M2 | HEART RATE: 88 BPM | SYSTOLIC BLOOD PRESSURE: 155 MMHG | WEIGHT: 210 LBS

## 2025-02-14 DIAGNOSIS — L89.103 DECUBITUS ULCER OF BACK, STAGE 3 (HCC): Primary | ICD-10-CM

## 2025-02-14 PROCEDURE — 11042 DBRDMT SUBQ TIS 1ST 20SQCM/<: CPT

## 2025-02-14 PROCEDURE — 11045 DBRDMT SUBQ TISS EACH ADDL: CPT

## 2025-02-14 NOTE — PROGRESS NOTES
tolerates topical  Cannot tolerate ORAL GENTAMYCIN    Levaquin [Levofloxacin]      Current Outpatient Medications on File Prior to Encounter   Medication Sig Dispense Refill    albuterol sulfate HFA (VENTOLIN HFA) 108 (90 Base) MCG/ACT inhaler Inhale 2 puffs into the lungs every 6 hours as needed for Wheezing or Shortness of Breath      Probiotic Product (PRO-BIOTIC BLEND PO) Take by mouth Daily (Patient not taking: Reported on 2/4/2025)      OXYBUTYNIN CHLORIDE PO Take by mouth daily (Patient not taking: Reported on 2/4/2025)      Wound Cleansers (VASHE WOUND THERAPY) external solution Apply 1 each topically daily (Patient not taking: Reported on 2/7/2025) 475 mL 3    sodium hypochlorite (DAKINS) 0.125 % SOLN external solution Apply topically daily 473 mL 3    hydroCHLOROthiazide (MICROZIDE) 12.5 MG capsule Take 1 capsule by mouth daily      Multiple Vitamins-Minerals (THERAPEUTIC MULTIVITAMIN-MINERALS) tablet Take 1 tablet by mouth daily      potassium chloride (KLOR-CON) 10 MEQ extended release tablet Take 2 tablets by mouth 2 times daily      acetaminophen (TYLENOL) 325 MG tablet Take 2 tablets by mouth every 6 hours as needed for Pain      atenolol (TENORMIN) 50 MG tablet Take 1 tablet by mouth every morning       No current facility-administered medications on file prior to encounter.       REVIEW OF SYSTEMS   ROS : All others Negative if blank [], Positive if [x]  General Urinary   [] Fevers [] Hematuria   [] Chills [] Dysuria   [] Weight Loss Vascular   Skin [] Claudication   [x] Tissue Loss [] Rest Pain   Eyes Neurologic   [] Wears Glasses/Contacts [] Stroke/TIA   [] Vision Changes [] Focal weakness   Respiratory [] Slurred Speech    [] Shortness of breath ENT   Cardiovascular [] Difficulty swallowing   [] Chest Pain Gastrointestinal   [] Shortness of breath with exertion [] Abdominal Pain   Patient has multiple wounds, may be pressure ulcers, over the junction of the lumbar and thoracic spine, right

## 2025-02-26 NOTE — DISCHARGE INSTRUCTIONS
Visit Discharge/Physician Orders     Discharge condition: Stable     Assessment of pain at discharge: MILD     Anesthetic used: 4% LIDOCAINE     Discharge to: Home     Left via:Private automobile     Accompanied by: accompanied by mother     ECF/HHA:  [facility]      Dressing Orders: TO BACK CLEAN WITH DAKINS SOLUTION, APPLY SANTYL TO DARK ESCHAR ONLY, APPLY DAKINS SOAKED GAUZE TO REMAINDER OF WOUND BED, DRY DRESSING. CHANGE DAILY      **Right posterior thigh clean with dakins, pack with moist gauze, apply dry dressing. Change dressing.     May pad wounds with foam for extra offloading         Treatment Orders: Follow a nutritious diet. Choose foods high in protein: chicken, fish, and eggs. Choose foods high in Vitamin C. Multivitamin daily unless contraindicated. Offload back as often as possible. Try to change position every 2 hours. Offload feet with eggcrate cushions.              [facility] followup visit _______2 WEEK  ___________  (Please note your next appointment above and if you are unable to keep, kindly give a 24 hour notice. Thank you.)     Physician signature:__________________________        If you experience any of the following, please call the Wound Care Center during business hours:     * Increase in Pain  * Temperature over 101  * Increase in drainage from your wound  * Drainage with a foul odor  * Bleeding  * Increase in swelling  * Need for compression bandage changes due to slippage, breakthrough drainage.     If you need medical attention outside of the business hours of the Wound Care Centers please contact your PCP or go to the nearest emergency room.

## 2025-02-28 ENCOUNTER — HOSPITAL ENCOUNTER (OUTPATIENT)
Dept: WOUND CARE | Age: 50
Discharge: HOME | End: 2025-02-28
Attending: EMERGENCY MEDICINE | Admitting: SURGERY
Payer: MEDICARE

## 2025-02-28 VITALS
TEMPERATURE: 96 F | SYSTOLIC BLOOD PRESSURE: 144 MMHG | HEART RATE: 82 BPM | DIASTOLIC BLOOD PRESSURE: 84 MMHG | RESPIRATION RATE: 18 BRPM

## 2025-02-28 DIAGNOSIS — L89.103 DECUBITUS ULCER OF BACK, STAGE 3 (HCC): ICD-10-CM

## 2025-02-28 DIAGNOSIS — Q05.9 DECUBITUS ULCER DUE TO SPINA BIFIDA (HCC): Primary | ICD-10-CM

## 2025-02-28 DIAGNOSIS — L89.90 DECUBITUS ULCER DUE TO SPINA BIFIDA (HCC): Primary | ICD-10-CM

## 2025-02-28 PROCEDURE — 87205 SMEAR GRAM STAIN: CPT

## 2025-02-28 PROCEDURE — 11042 DBRDMT SUBQ TIS 1ST 20SQCM/<: CPT

## 2025-02-28 PROCEDURE — 11045 DBRDMT SUBQ TISS EACH ADDL: CPT

## 2025-02-28 PROCEDURE — 87070 CULTURE OTHR SPECIMN AEROBIC: CPT

## 2025-02-28 PROCEDURE — 87077 CULTURE AEROBIC IDENTIFY: CPT

## 2025-02-28 NOTE — PROGRESS NOTES
1506   Odor Mild 02/28/25 1506   Ceci-wound Assessment Fragile 02/28/25 1515   Margins Attached edges 02/28/25 1515   Wound Thickness Description not for Pressure Injury Full thickness 02/28/25 1515   Number of days: 0        Procedure Note  Indications:  Based on my examination of this patient's wound(s)/ulcer(s) today, debridement is required to promote healing and evaluate the wound base.    Performed by: Christiano Brown DO    Consent obtained:  Yes    Time out taken:  Yes    Pain Control: Anesthetic  Anesthetic: 4% Lidocaine Liquid Topical     Debridement:Excisional Debridement    Using scalpel and curette the wound(s)/ulcer(s) was/were sharply debrided down through and including the removal of subcutaneous tissue.        Devitalized Tissue Debrided:  fibrin, biofilm, slough, and exudate, necrotic tissue    Wound/Ulcer #: 1     Percent of Wound/Ulcer Debrided: 75%    Total Surface Area Debrided:  55 sq cm     Estimated Blood Loss:  Minimal    Hemostasis Achieved:  by pressure    Procedural Pain:  0  / 10     Post Procedural Pain:  0 / 10     Response to treatment:  Well tolerated by patient.     A culture was done.      Plan:     Pt is not a smoker   In my professional opinion and based off the information that is available at this time this patient has appropriate indication for HBO Therapy: No    Treatment Note please see attached Discharge Instructions    Written patient dismissal instructions given to patient and signed by patient or POA.         Discharge Instructions         Visit Discharge/Physician Orders     Discharge condition: Stable     Assessment of pain at discharge: MILD     Anesthetic used: 4% LIDOCAINE     Discharge to: Home     Left via:Private automobile     Accompanied by: accompanied by mother     ECF/HHA:  [facility]      Dressing Orders: TO BACK CLEAN WITH DAKINS SOLUTION, APPLY SANTYL TO DARK ESCHAR ONLY, APPLY DAKINS SOAKED GAUZE TO REMAINDER OF WOUND BED, DRY DRESSING. CHANGE DAILY

## 2025-03-02 LAB
MICROORGANISM SPEC CULT: ABNORMAL
MICROORGANISM SPEC CULT: ABNORMAL
MICROORGANISM/AGENT SPEC: ABNORMAL
SERVICE CMNT-IMP: ABNORMAL
SPECIMEN DESCRIPTION: ABNORMAL

## 2025-03-14 ENCOUNTER — HOSPITAL ENCOUNTER (OUTPATIENT)
Dept: WOUND CARE | Age: 50
Discharge: HOME | End: 2025-03-14
Attending: EMERGENCY MEDICINE | Admitting: SURGERY
Payer: MEDICARE

## 2025-03-14 VITALS
HEART RATE: 110 BPM | TEMPERATURE: 97.4 F | DIASTOLIC BLOOD PRESSURE: 90 MMHG | RESPIRATION RATE: 16 BRPM | SYSTOLIC BLOOD PRESSURE: 140 MMHG

## 2025-03-14 DIAGNOSIS — L89.899: ICD-10-CM

## 2025-03-14 DIAGNOSIS — L89.104 DECUBITUS ULCER OF BACK, STAGE 4 (HCC): Primary | ICD-10-CM

## 2025-03-14 PROCEDURE — 11042 DBRDMT SUBQ TIS 1ST 20SQCM/<: CPT

## 2025-03-14 PROCEDURE — 11045 DBRDMT SUBQ TISS EACH ADDL: CPT

## 2025-03-14 RX ORDER — LIDOCAINE HYDROCHLORIDE 40 MG/ML
SOLUTION TOPICAL ONCE
Status: COMPLETED | OUTPATIENT
Start: 2025-03-14 | End: 2025-03-14

## 2025-03-14 RX ADMIN — LIDOCAINE HYDROCHLORIDE 8 ML: 40 SOLUTION TOPICAL at 13:46

## 2025-03-14 ASSESSMENT — PAIN DESCRIPTION - ORIENTATION: ORIENTATION: MID

## 2025-03-14 ASSESSMENT — PAIN DESCRIPTION - LOCATION: LOCATION: BACK

## 2025-03-14 ASSESSMENT — PAIN - FUNCTIONAL ASSESSMENT: PAIN_FUNCTIONAL_ASSESSMENT: ACTIVITIES ARE NOT PREVENTED

## 2025-03-14 ASSESSMENT — PAIN DESCRIPTION - DESCRIPTORS: DESCRIPTORS: ACHING;DULL

## 2025-03-14 ASSESSMENT — PAIN SCALES - GENERAL: PAINLEVEL_OUTOF10: 3

## 2025-03-14 NOTE — PROGRESS NOTES
Wound Healing Center /Hyperbarics   History and Physical/Consultation  Vascular    Referring Physician : Massimo Yoon MD  Thai Church  MEDICAL RECORD NUMBER:  [id number]  AGE: 49 y.o.   GENDER: male  : 1975  EPISODE DATE:  3/14/2025  Subjective:     Chief Complaint   Patient presents with    Wound Check     \"I'm here for my wound check\"         HISTORY of PRESENT ILLNESS HPI     Thai Church is a 49 y.o. male who presents today for wound/ulcer evaluation.   History of Wound Context:  The patient has had a wounds of both feet, right ischium, back pressure ulcers between the junction of the lumbar spine and thoracic spine which was first noted approximately at least 3 to 4 months, patient and his mother tell me that they noticed that when the patient got home from a extended care facility but they do not recall how long he had ulcers,.  This has been treated at the [place] wound care center and family came to [facility], and in the past he was treated here with good results.  On their initial visit to the wound healing center, 23, the patient has noted that the wound has not been improving.  The patient has had similar previous wounds in the past.        Patient has spina bifida with hydrocephalus, has undergone previous surgeries multiple including peritoneal venous shunt for hydrocephalus in the [place], has paraplegia, does not ambulate, recently underwent insertion of suprapubic catheter    Pt is currently not on abx.      Wound/Ulcer Pain Timing/Severity: constant  Quality of pain: dull, aching  Severity:  3 / 10   Modifying Factors: None  Associated Signs/Symptoms: drainage and pain    Ulcer Identification:  Ulcer Type: pressure and non-healing/non-surgical  Contributing Factors: lymphedema, chronic pressure, decreased mobility, and shear force    Diabetic/Pressure/Non Pressure Ulcers only:  Ulcer: N/A      Wound: Patient does have multiple pressure ulcers, at the junction

## 2025-03-14 NOTE — DISCHARGE INSTRUCTIONS
Visit Discharge/Physician Orders     Discharge condition: Stable     Assessment of pain at discharge: MILD     Anesthetic used: 4% LIDOCAINE     Discharge to: Home     Left via:Private automobile     Accompanied by: accompanied by mother     ECF/HHA:  [facility]      Dressing Orders: TO BACK CLEAN WITH DAKINS SOLUTION, APPLY SANTYL TO DARK ESCHAR ONLY, APPLY DAKINS SOAKED GAUZE TO REMAINDER OF WOUND BED, DRY DRESSING. CHANGE DAILY      **Right posterior thigh clean with dakins, apply santyl pac with moist gauze, apply dry dressing. Change dressing.      May pad wounds with foam for extra offloading         Treatment Orders: Follow a nutritious diet. Choose foods high in protein: chicken, fish, and eggs. Choose foods high in Vitamin C. Multivitamin daily unless contraindicated. Offload back as often as possible. Try to change position every 2 hours. Offload feet with eggcrate cushions.              [facility] followup visit _______2 WEEK  ___________  (Please note your next appointment above and if you are unable to keep, kindly give a 24 hour notice. Thank you.)     Physician signature:__________________________        If you experience any of the following, please call the Wound Care Center during business hours:     * Increase in Pain  * Temperature over 101  * Increase in drainage from your wound  * Drainage with a foul odor  * Bleeding  * Increase in swelling  * Need for compression bandage changes due to slippage, breakthrough drainage.     If you need medical attention outside of the business hours of the Wound Care Centers please contact your PCP or go to the nearest emergency room.

## 2025-03-26 NOTE — DISCHARGE INSTRUCTIONS
Visit Discharge/Physician Orders     Discharge condition: Stable     Assessment of pain at discharge: MILD     Anesthetic used: 4% LIDOCAINE     Discharge to: Home     Left via:Private automobile     Accompanied by: accompanied by mother     ECF/HHA:  [facility]      Dressing Orders: TO BACK CLEAN WITH DAKINS SOLUTION, APPLY SANTYL TO DARK ESCHAR ONLY, APPLY DAKINS SOAKED GAUZE TO REMAINDER OF WOUND BED, DRY DRESSING. CHANGE DAILY      **Right posterior thigh clean with dakins, apply santyl pac with moist gauze, apply dry dressing. Change dressing.      May pad wounds with foam for extra offloading         Treatment Orders: Follow a nutritious diet. Choose foods high in protein: chicken, fish, and eggs. Choose foods high in Vitamin C. Multivitamin daily unless contraindicated. Offload back as often as possible. Try to change position every 2 hours. Offload feet with eggcrate cushions.              [facility] followup visit _______2 WEEK  ___________  (Please note your next appointment above and if you are unable to keep, kindly give a 24 hour notice. Thank you.)     Physician signature:__________________________        If you experience any of the following, please call the Wound Care Center during business hours:     * Increase in Pain  * Temperature over 101  * Increase in drainage from your wound  * Drainage with a foul odor  * Bleeding  * Increase in swelling  * Need for compression bandage changes due to slippage, breakthrough drainage.     If you need medical attention outside of the business hours of the Wound Care Centers please contact your PCP or go to the nearest emergency room.

## 2025-03-28 ENCOUNTER — HOSPITAL ENCOUNTER (OUTPATIENT)
Dept: WOUND CARE | Age: 50
Discharge: HOME | End: 2025-03-28
Attending: EMERGENCY MEDICINE | Admitting: SURGERY
Payer: MEDICARE

## 2025-03-28 VITALS
RESPIRATION RATE: 16 BRPM | HEART RATE: 80 BPM | TEMPERATURE: 97 F | DIASTOLIC BLOOD PRESSURE: 88 MMHG | SYSTOLIC BLOOD PRESSURE: 138 MMHG

## 2025-03-28 DIAGNOSIS — L89.104 DECUBITUS ULCER OF BACK, STAGE 4 (HCC): Primary | ICD-10-CM

## 2025-03-28 DIAGNOSIS — L89.899: ICD-10-CM

## 2025-03-28 PROCEDURE — 99213 OFFICE O/P EST LOW 20 MIN: CPT | Performed by: SURGERY

## 2025-03-28 PROCEDURE — 99213 OFFICE O/P EST LOW 20 MIN: CPT

## 2025-03-28 RX ORDER — LIDOCAINE HYDROCHLORIDE 40 MG/ML
SOLUTION TOPICAL ONCE
Status: COMPLETED | OUTPATIENT
Start: 2025-03-28 | End: 2025-03-28

## 2025-03-28 RX ADMIN — LIDOCAINE HYDROCHLORIDE 6 ML: 40 SOLUTION TOPICAL at 14:01

## 2025-03-28 ASSESSMENT — PAIN DESCRIPTION - DESCRIPTORS: DESCRIPTORS: ACHING;DULL

## 2025-03-28 ASSESSMENT — PAIN - FUNCTIONAL ASSESSMENT: PAIN_FUNCTIONAL_ASSESSMENT: ACTIVITIES ARE NOT PREVENTED

## 2025-03-28 ASSESSMENT — PAIN DESCRIPTION - ORIENTATION: ORIENTATION: MID

## 2025-03-28 ASSESSMENT — PAIN SCALES - GENERAL: PAINLEVEL_OUTOF10: 4

## 2025-03-28 ASSESSMENT — PAIN DESCRIPTION - LOCATION: LOCATION: BACK

## 2025-03-28 NOTE — PROGRESS NOTES
Wound Healing Center /Hyperbarics   History and Physical/Consultation  Vascular    Referring Physician : Massimo Yoon MD  Thai Church  MEDICAL RECORD NUMBER:  [id number]  AGE: 49 y.o.   GENDER: male  : 1975  EPISODE DATE:  3/28/2025  Subjective:     Chief Complaint   Patient presents with    Wound Check     \"My leg wound is almost healed\"         HISTORY of PRESENT ILLNESS HPI     Thai Church is a 49 y.o. male who presents today for wound/ulcer evaluation.   History of Wound Context:  The patient has had a wounds of both feet, right ischium, back pressure ulcers between the junction of the lumbar spine and thoracic spine which was first noted approximately at least 3 to 4 months, patient and his mother tell me that they noticed that when the patient got home from a extended care facility but they do not recall how long he had ulcers,.  This has been treated at the [place] wound care center and family came to [facility], and in the past he was treated here with good results.  On their initial visit to the wound healing center, 23, the patient has noted that the wound has not been improving.  The patient has had similar previous wounds in the past.        Patient has spina bifida with hydrocephalus, has undergone previous surgeries multiple including peritoneal venous shunt for hydrocephalus in the [place], has paraplegia, does not ambulate, recently underwent insertion of suprapubic catheter    Pt is currently not on abx.      Wound/Ulcer Pain Timing/Severity: constant  Quality of pain: dull, aching  Severity:  3 / 10   Modifying Factors: None  Associated Signs/Symptoms: drainage and pain    Ulcer Identification:  Ulcer Type: pressure and non-healing/non-surgical  Contributing Factors: lymphedema, chronic pressure, decreased mobility, and shear force    Diabetic/Pressure/Non Pressure Ulcers only:  Ulcer: N/A      Wound: Patient does have multiple pressure ulcers, at the junction

## 2025-04-07 NOTE — DISCHARGE INSTRUCTIONS
Visit Discharge/Physician Orders     Discharge condition: Stable     Assessment of pain at discharge: MILD     Anesthetic used: 4% LIDOCAINE     Discharge to: Home     Left via:Private automobile     Accompanied by: accompanied by mother     ECF/HHA:  [facility]      Dressing Orders: TO BACK CLEAN WITH DAKINS SOLUTION,  APPLY DAKINS SOAKED GAUZE TO REMAINDER OF WOUND BED, DRY DRESSING. CHANGE DAILY      **Right posterior thigh clean with dakins, apply plain packing, apply dry dressing. Change dressing.      May pad wounds with foam for extra offloading         Treatment Orders: Follow a nutritious diet. Choose foods high in protein: chicken, fish, and eggs. Choose foods high in Vitamin C. Multivitamin daily unless contraindicated. Offload back as often as possible. Try to change position every 2 hours. Offload feet with eggcrate cushions.              [facility] followup visit _______2 WEEK  ___________  (Please note your next appointment above and if you are unable to keep, kindly give a 24 hour notice. Thank you.)     Physician signature:__________________________        If you experience any of the following, please call the Wound Care Center during business hours:     * Increase in Pain  * Temperature over 101  * Increase in drainage from your wound  * Drainage with a foul odor  * Bleeding  * Increase in swelling  * Need for compression bandage changes due to slippage, breakthrough drainage.     If you need medical attention outside of the business hours of the Wound Care Centers please contact your PCP or go to the nearest emergency room.

## 2025-04-11 ENCOUNTER — HOSPITAL ENCOUNTER (OUTPATIENT)
Dept: WOUND CARE | Age: 50
Discharge: HOME | End: 2025-04-11
Attending: EMERGENCY MEDICINE | Admitting: SURGERY

## 2025-04-11 VITALS
BODY MASS INDEX: 38.64 KG/M2 | HEART RATE: 73 BPM | DIASTOLIC BLOOD PRESSURE: 62 MMHG | WEIGHT: 210 LBS | RESPIRATION RATE: 16 BRPM | HEIGHT: 62 IN | TEMPERATURE: 96.9 F | SYSTOLIC BLOOD PRESSURE: 138 MMHG

## 2025-04-11 DIAGNOSIS — L89.313 DECUBITUS ULCER OF ISCHIAL AREA, RIGHT, STAGE III (HCC): ICD-10-CM

## 2025-04-11 DIAGNOSIS — L89.90 DECUBITUS ULCER DUE TO SPINA BIFIDA (HCC): Primary | ICD-10-CM

## 2025-04-11 DIAGNOSIS — L97.522 ULCER OF FOOT, LEFT, WITH FAT LAYER EXPOSED (HCC): ICD-10-CM

## 2025-04-11 DIAGNOSIS — L89.103 DECUBITUS ULCER OF BACK, STAGE 3 (HCC): ICD-10-CM

## 2025-04-11 DIAGNOSIS — L97.512 ULCER OF FOOT, RIGHT, WITH FAT LAYER EXPOSED (HCC): ICD-10-CM

## 2025-04-11 DIAGNOSIS — Q05.9 DECUBITUS ULCER DUE TO SPINA BIFIDA (HCC): Primary | ICD-10-CM

## 2025-04-11 NOTE — WOUND CARE
Wound Care Supplies      Supply Company:     [employer] [facility] [employer] [place], [place]  p:[phone] f: [phone]     Ordering Center:     [facility]  [address]  [facility] [address] [phone]  WOUND CARE Dept: [phone]   FAX NUMBER [phone]    Patient Information:      Thai HOBBS Ed  [address]  [facility] [address]   [phone]   : 1975  AGE: 49 y.o.     GENDER: male   EPISODE DATE: 2025    Insurance:      PRIMARY INSURANCE:  Plan: MEDICARE PART A AND B  Coverage: MEDICARE  Effective Date: 3/1/2001  Group Number: [unfilled]  Subscriber Number: [id number] - (Medicare)    Payer/Plan Subscr  Sex Relation Sub. Ins. ID Effective Group Num   1. MEDICARE - ME* EDTHAI HOBBS 1975 Male Self [id number] 3/1/01                                    [address]    2. [facility] EDTHAI HOBBS 1975 Male Self [id number] 10/1/1997                                    [address] [address]       Patient Wound Information:      Problem List Items Addressed This Visit          Musculoskeletal and Integument    Ulcer of foot, left, with fat layer exposed (HCC)       Other    Decubitus ulcer due to spina bifida (HCC) - Primary    Decubitus ulcer of ischial area, right, stage III (HCC)    Ulcer of foot, right, with fat layer exposed (HCC)    Decubitus ulcer of back, stage 3 (HCC)       ICD 10 Code: L89.90, Q05.9, L89.313     WOUNDS REQUIRING DRESSING SUPPLIES:     Wound 23 Back new wound #1 back pressure (Active)   Wound Image   25 1441   Wound Etiology Pressure Stage 3 23 1449   Dressing Status New dressing applied 25 1405   Wound Cleansed Vashe 25 1405   Dressing/Treatment Pharmaceutical agent (see MAR);Silicone border 25 1405   Offloading for Diabetic Foot Ulcers Offloading ordered 10/18/24 1426   Wound Length (cm) 7.9 cm 25 1441   Wound Width (cm) 8.6 cm 25 1441   Wound Depth (cm) 0.4 cm

## 2025-04-11 NOTE — PROGRESS NOTES
aquacel and dry dressing to all wounds    6/14/2024  Ulcer over the plantar surface left foot and the medial aspect of the right knee, much smaller and improving  Ulcer over the back, stable with some exudate, debrided    6/28/24  Right knee ulcer measuring larger, appearance stabl   Plantar foot wounds improving, measuring smaller   Back wound measuring larger and deeper, appearance worsening, grey/nonblanchable at lower aspect   Wound debrided   Culture obtained   Aquacel ag and dry dressing to all wounds   Patient states [employer]  has modified chair but positioning worse for his back - scheduled to come out to readjust the wheelchair again 7/8/24  Discussed importance of trying to minimize pressure to ulcerated areas, especially back due to worsening of wound     7/26/24  Left heel wound healed   Back wound measuring smaller, appearence improving   Continue Aquacel ag and dry dressing   Pad feet and knee to prevent new ulcer formation   Protect fragile new skin - pat dry when wet     8/9/24  Back wound measuring larger and deeper  Continue Aquacel ag and dry dressing   Continue to pad feet and knee to prevent new ulcer formation   Discussed importance of minimizing pressure to ulcerated areas and repositioning q2h    8/23/24  Back wound measuring larger but depth improved from 1cm to 0.5cm   Appearance improving   Continue to pad feet and knee to prevent new ulcer formation   Santyl and dry dressing to back wound   Continue minimizing pressure to ulcerated areas and repositioning q2h    9/6/24  New left foot ulcer   Back wound measuring smaller, depth stable   Appearance continues to improve   Continue to pad feet and knee to prevent pressure  Santyl and dry dressing to back wound   Aquacel ag and dry dressing to plantar foot   Continue minimizing pressure to ulcerated areas and repositioning q2h    9/20/24  Left foot wound measuring larger, periwound maceration present   Back wound measuring smaller,

## 2025-04-21 NOTE — DISCHARGE INSTRUCTIONS
Visit Discharge/Physician Orders     Discharge condition: Stable     Assessment of pain at discharge: MILD     Anesthetic used: 4% LIDOCAINE     Discharge to: Home     Left via:Private automobile     Accompanied by: accompanied by mother     ECF/HHA:  [facility]      Dressing Orders: TO BACK CLEAN WITH DAKINS SOLUTION,  APPLY DAKINS SOAKED GAUZE TO REMAINDER OF WOUND BED, DRY DRESSING. CHANGE DAILY      **Right posterior thigh clean with dakins, apply plain packing, apply dry dressing. Change dressing.      May pad wounds with foam for extra offloading         Treatment Orders: Follow a nutritious diet. Choose foods high in protein: chicken, fish, and eggs. Choose foods high in Vitamin C. Multivitamin daily unless contraindicated. Offload back as often as possible. Try to change position every 2 hours. Offload feet with eggcrate cushions.              [facility] followup visit _______2 WEEK  ___________  (Please note your next appointment above and if you are unable to keep, kindly give a 24 hour notice. Thank you.)     Physician signature:__________________________        If you experience any of the following, please call the Wound Care Center during business hours:     * Increase in Pain  * Temperature over 101  * Increase in drainage from your wound  * Drainage with a foul odor  * Bleeding  * Increase in swelling  * Need for compression bandage changes due to slippage, breakthrough drainage.     If you need medical attention outside of the business hours of the Wound Care Centers please contact your PCP or go to the nearest emergency room.

## 2025-04-25 ENCOUNTER — HOSPITAL ENCOUNTER (OUTPATIENT)
Dept: WOUND CARE | Age: 50
Discharge: HOME | End: 2025-04-25
Attending: EMERGENCY MEDICINE | Admitting: SURGERY

## 2025-04-28 NOTE — DISCHARGE INSTRUCTIONS
Visit Discharge/Physician Orders     Discharge condition: Stable     Assessment of pain at discharge: MILD     Anesthetic used: 4% LIDOCAINE     Discharge to: Home     Left via:Private automobile     Accompanied by: accompanied by mother     ECF/HHA:  [facility]      Dressing Orders: TO BACK CLEAN WITH DAKINS SOLUTION,  APPLY DAKINS SOAKED GAUZE TO REMAINDER OF WOUND BED, DRY DRESSING. CHANGE DAILY      May pad wounds with foam for extra offloading      Treatment Orders: Follow a nutritious diet. Choose foods high in protein: chicken, fish, and eggs. Choose foods high in Vitamin C. Multivitamin daily unless contraindicated. Offload back as often as possible. Try to change position every 2 hours. Offload feet with eggcrate cushions.              [facility] followup visit _______2 WEEK  ___________  (Please note your next appointment above and if you are unable to keep, kindly give a 24 hour notice. Thank you.)     Physician signature:__________________________        If you experience any of the following, please call the Wound Care Center during business hours:     * Increase in Pain  * Temperature over 101  * Increase in drainage from your wound  * Drainage with a foul odor  * Bleeding  * Increase in swelling  * Need for compression bandage changes due to slippage, breakthrough drainage.     If you need medical attention outside of the business hours of the Wound Care Centers please contact your PCP or go to the nearest emergency room.

## 2025-05-02 ENCOUNTER — HOSPITAL ENCOUNTER (OUTPATIENT)
Dept: WOUND CARE | Age: 50
Discharge: HOME | End: 2025-05-02
Attending: EMERGENCY MEDICINE | Admitting: SURGERY
Payer: MEDICARE

## 2025-05-02 VITALS
DIASTOLIC BLOOD PRESSURE: 80 MMHG | TEMPERATURE: 97.5 F | HEART RATE: 84 BPM | SYSTOLIC BLOOD PRESSURE: 132 MMHG | RESPIRATION RATE: 16 BRPM

## 2025-05-02 DIAGNOSIS — L89.104 DECUBITUS ULCER OF BACK, STAGE 4 (HCC): Primary | ICD-10-CM

## 2025-05-02 PROCEDURE — 97602 WOUND(S) CARE NON-SELECTIVE: CPT

## 2025-05-02 RX ORDER — CEFDINIR 300 MG/1
300 CAPSULE ORAL 2 TIMES DAILY
COMMUNITY

## 2025-05-02 RX ORDER — LIDOCAINE HYDROCHLORIDE 40 MG/ML
SOLUTION TOPICAL ONCE
Status: COMPLETED | OUTPATIENT
Start: 2025-05-02 | End: 2025-05-02

## 2025-05-02 RX ADMIN — LIDOCAINE HYDROCHLORIDE 6 ML: 40 SOLUTION TOPICAL at 14:57

## 2025-05-02 ASSESSMENT — PAIN DESCRIPTION - ORIENTATION: ORIENTATION: DISTAL

## 2025-05-02 ASSESSMENT — PAIN - FUNCTIONAL ASSESSMENT: PAIN_FUNCTIONAL_ASSESSMENT: ACTIVITIES ARE NOT PREVENTED

## 2025-05-02 ASSESSMENT — PAIN DESCRIPTION - LOCATION: LOCATION: BACK

## 2025-05-02 ASSESSMENT — PAIN SCALES - GENERAL: PAINLEVEL_OUTOF10: 3

## 2025-05-02 ASSESSMENT — PAIN DESCRIPTION - DESCRIPTORS: DESCRIPTORS: DULL

## 2025-05-02 NOTE — PROGRESS NOTES
multiple wounds, may be pressure ulcers, over the junction of the lumbar and thoracic spine, right ischial tuberosity, plantar surface of both feet with macular exposed, has spina bifida with paraplegia, status post insertion of dual-chamber hydrocephalus, obstructive sleep apnea currently on BiPAP, hypertension, status post insertion of a suprapubic catheter [] Melena       [] Hematochezia               Objective:    /80   Pulse 84   Temp 97.5 °F (36.4 °C) (Temporal)   Resp 16   Wt Readings from Last 3 Encounters:   04/11/25 95.3 kg (210 lb)   02/14/25 95.3 kg (210 lb)   02/07/25 95.3 kg (210 lb)       PHYSICAL EXAM  CONSTITUTIONAL:   Awake, alert, cooperative  PSYCHIATRIC :  Oriented to time, place and person      normal insight to disease process  ENT:  External ears and nose without lesions    Hearing deficits is not noted  NECK: Supple, symmetrical, trachea midline    Thyroid goiter not appreciated    Carotid bruit is not noted bilaterally  LUNGS:  No increased work of breathing                  Clear to auscultation bilaterally   CARDIOVASCULAR:  regular rate and rhythm   ABDOMEN:  soft, non-distended, non-tender    Hernias is not noted   Aorta is not palpable   Lymphatics : Cervical lymphadenopathy is not noted     Femoral lymphadenopathy is not noted  SKIN:   Skin color is normal   Texture and turgor is  normal   Induration is not noted  EXTREMITIES:   R UE Edema is not noted  L UE Edema is not noted  R LE Edema is  noted  L LE Edema is  noted  Patient family tell me that the swelling of both legs with puffiness of the dorsum of the feet and ankle for many years, clinically consistent with lymphedema without any calf tenderness      R femoral 2 L femoral 2   R dorsalis pedis 2 L dorsalis pedis 2   R posterior tibial 0 L posterior tibial 0     Assessment:     Problem List Items Addressed This Visit       Decubitus ulcer of back, stage 4 (HCC) - Primary         Procedure Note  Indications:  Based on

## 2025-05-16 ENCOUNTER — HOSPITAL ENCOUNTER (OUTPATIENT)
Dept: WOUND CARE | Age: 50
Discharge: HOME | End: 2025-05-16
Attending: EMERGENCY MEDICINE | Admitting: SURGERY
Payer: MEDICARE

## 2025-05-16 VITALS
RESPIRATION RATE: 17 BRPM | HEART RATE: 78 BPM | WEIGHT: 210 LBS | TEMPERATURE: 97.6 F | HEIGHT: 62 IN | BODY MASS INDEX: 38.64 KG/M2 | SYSTOLIC BLOOD PRESSURE: 138 MMHG | DIASTOLIC BLOOD PRESSURE: 90 MMHG

## 2025-05-16 DIAGNOSIS — L97.522 ULCER OF FOOT, LEFT, WITH FAT LAYER EXPOSED (HCC): ICD-10-CM

## 2025-05-16 DIAGNOSIS — L89.313 DECUBITUS ULCER OF ISCHIAL AREA, RIGHT, STAGE III (HCC): ICD-10-CM

## 2025-05-16 DIAGNOSIS — L89.103 DECUBITUS ULCER OF BACK, STAGE 3 (HCC): ICD-10-CM

## 2025-05-16 DIAGNOSIS — Q05.9 DECUBITUS ULCER DUE TO SPINA BIFIDA (HCC): Primary | ICD-10-CM

## 2025-05-16 DIAGNOSIS — L97.512 ULCER OF FOOT, RIGHT, WITH FAT LAYER EXPOSED (HCC): ICD-10-CM

## 2025-05-16 DIAGNOSIS — L89.90 DECUBITUS ULCER DUE TO SPINA BIFIDA (HCC): Primary | ICD-10-CM

## 2025-05-16 PROCEDURE — 11045 DBRDMT SUBQ TISS EACH ADDL: CPT

## 2025-05-16 PROCEDURE — 11042 DBRDMT SUBQ TIS 1ST 20SQCM/<: CPT

## 2025-05-16 RX ORDER — LIDOCAINE HYDROCHLORIDE 40 MG/ML
SOLUTION TOPICAL ONCE
Status: COMPLETED | OUTPATIENT
Start: 2025-05-16 | End: 2025-05-16

## 2025-05-16 RX ADMIN — LIDOCAINE HYDROCHLORIDE 10 ML: 40 SOLUTION TOPICAL at 14:23

## 2025-05-16 NOTE — WOUND CARE
Wound Care Supplies      Supply Company:     [employer] [facility] [employer] [place], [place]  p:[phone] f: [phone]     Ordering Center:     [facility]  [address], [address]  [facility] [address] [phone]  WOUND CARE Dept: [phone]   FAX NUMBER [phone]    Patient Information:      Thai Ward  [address]  [facility] [address]   [phone]   : 1975  AGE: 49 y.o.     GENDER: male   EPISODE DATE: 2025    Insurance:      PRIMARY INSURANCE:  Plan: MEDICARE PART A AND B  Coverage: MEDICARE  Effective Date: 3/1/2001  Group Number: [unfilled]  Subscriber Number: [id number] - (Medicare)    Payer/Plan Subscr  Sex Relation Sub. Ins. ID Effective Group Num   1. MEDICARE - ME* THAI WARD FABY 1975 Male Self [id number] 3/1/01                                    [address]    2. [facility]* THAI WARD FABY 1975 Male Self [id number] 10/[birth year]/1997                                    [address] [address]       Patient Wound Information:      Problem List Items Addressed This Visit          Musculoskeletal and Integument    Ulcer of foot, left, with fat layer exposed (HCC)    Relevant Orders    Initiate Outpatient Wound Care Protocol       Other    Decubitus ulcer due to spina bifida (HCC) - Primary    Relevant Orders    Initiate Outpatient Wound Care Protocol    Decubitus ulcer of ischial area, right, stage III (HCC)    Relevant Orders    Initiate Outpatient Wound Care Protocol    Ulcer of foot, right, with fat layer exposed (HCC)    Relevant Orders    Initiate Outpatient Wound Care Protocol    Decubitus ulcer of back, stage 3 (HCC)    Relevant Orders    Initiate Outpatient Wound Care Protocol       ICD 10 Code: L89.90, Q05.9     WOUNDS REQUIRING DRESSING SUPPLIES:     Wound 23 Back new wound #1 back pressure (Active)   Wound Image   25 1441   Wound Etiology Pressure Stage 3 23 1449   Dressing Status New dressing applied 25 1458

## 2025-05-16 NOTE — PROGRESS NOTES
other wounds     2/23/24  Left ischium wound healed   Left plantar foot wound appearance improving, measuring slightly smaller   Right ischium and back wounds continue  to improve, measuring smaller   Wounds debrided   Plain aquacel and dry dressing to all wounds     3/8/24  Left distal plantar appearance improving but measuring larger and deeper  Left midfoot plantar foot wound appearance improving, measuring smaller   Right ischium and back wounds continue  to improve, measuring smaller   Wounds debrided   Distal plantar wound cultures obtained   Continue Plain aquacel and dry dressing to all wounds     3/22/24  Left foot plantar wounds appearances stable but measuring larger and deeper  Right ischium and back wounds continue  to improve, measuring smaller   Wounds debrided   Doxycycline 100mg BID [id number] prescribed   Gentamicin ointment prescribed (patient has allergy but states he tolerates topically)  Continue Plain aquacel and dry dressing to all wounds   Discussed trying to add a pillow to foot plate of wheelchair to prevent pressure     4/5/2024  Ulcers, over the thoracic spine area looks clean, ischial ulcer healing well, patient has 2 wounds, right left plantar area, the right appears to be new, last 1 week, looks fairly clean    4/19/24  Plantar and back wounds measuring larger   Wheelchair company to come out to adjust foot placement   Wounds debrided   Continue aquacel and dry dressing to all wounds     5/3/24  New right knee and left heel wounds   Right ischial wound reopened   Plantar and back wounds larger   Wheelchair company came out to adjust foot placement x3days ago  Wounds debrided   Continue aquacel and dry dressing to all wounds   5/17/2024  Wounds of the back and plantar surface of the left foot were debrided, looks fairly clean and smaller  The remaining wounds were not debrided    5/31/24  Left heel wound healed   All wounds overall improving, measuring smaller   Wounds debrided   Continue

## 2025-05-16 NOTE — DISCHARGE INSTRUCTIONS
Visit Discharge/Physician Orders     Discharge condition: Stable     Assessment of pain at discharge: MILD     Anesthetic used: 4% LIDOCAINE     Discharge to: Home     Left via:Private automobile     Accompanied by: accompanied by mother     ECF/HHA:  [facility]      Dressing Orders: TO BACK CLEAN WITH DAKINS SOLUTION,  APPLY DAKINS SOAKED GAUZE TO REMAINDER OF WOUND BED, DRY DRESSING. CHANGE DAILY      May pad wounds with foam for extra offloading      Treatment Orders: Follow a nutritious diet. Choose foods high in protein: chicken, fish, and eggs. Choose foods high in Vitamin C. Multivitamin daily unless contraindicated. Offload back as often as possible. Try to change position every 2 hours. Offload feet with eggcrate cushions.              [facility] followup visit _______2 WEEK  ___________  (Please note your next appointment above and if you are unable to keep, kindly give a 24 hour notice. Thank you.)     Physician signature:__________________________        If you experience any of the following, please call the Wound Care Center during business hours:     * Increase in Pain  * Temperature over 101  * Increase in drainage from your wound  * Drainage with a foul odor  * Bleeding  * Increase in swelling  * Need for compression bandage changes due to slippage, breakthrough drainage.     If you need medical attention outside of the business hours of the Wound Care Centers please contact your PCP or go to the nearest emergency room.

## 2025-05-28 NOTE — DISCHARGE INSTRUCTIONS
Visit Discharge/Physician Orders     Discharge condition: Stable     Assessment of pain at discharge: MILD     Anesthetic used: 4% LIDOCAINE     Discharge to: Home     Left via:Private automobile     Accompanied by: accompanied by mother     ECF/HHA:  [facility]      Dressing Orders: TO BACK CLEAN WITH DAKINS SOLUTION,  APPLY DAKINS SOAKED GAUZE TO REMAINDER OF WOUND BED, DRY DRESSING. CHANGE DAILY      May pad wounds with foam for extra offloading      Treatment Orders: Follow a nutritious diet. Choose foods high in protein: chicken, fish, and eggs. Choose foods high in Vitamin C. Multivitamin daily unless contraindicated. Offload back as often as possible. Try to change position every 2 hours. Offload feet with eggcrate cushions.              [facility] followup visit _______2 WEEK  ___________  (Please note your next appointment above and if you are unable to keep, kindly give a 24 hour notice. Thank you.)     Physician signature:__________________________        If you experience any of the following, please call the Wound Care Center during business hours:     * Increase in Pain  * Temperature over 101  * Increase in drainage from your wound  * Drainage with a foul odor  * Bleeding  * Increase in swelling  * Need for compression bandage changes due to slippage, breakthrough drainage.     If you need medical attention outside of the business hours of the Wound Care Centers please contact your PCP or go to the nearest emergency room.

## 2025-05-30 ENCOUNTER — HOSPITAL ENCOUNTER (OUTPATIENT)
Dept: WOUND CARE | Age: 50
Discharge: HOME | End: 2025-05-30
Attending: EMERGENCY MEDICINE | Admitting: SURGERY
Payer: MEDICARE

## 2025-05-30 VITALS
TEMPERATURE: 96.6 F | SYSTOLIC BLOOD PRESSURE: 122 MMHG | DIASTOLIC BLOOD PRESSURE: 68 MMHG | HEART RATE: 80 BPM | RESPIRATION RATE: 16 BRPM

## 2025-05-30 DIAGNOSIS — L97.522 ULCER OF FOOT, LEFT, WITH FAT LAYER EXPOSED (HCC): ICD-10-CM

## 2025-05-30 DIAGNOSIS — L97.512 ULCER OF FOOT, RIGHT, WITH FAT LAYER EXPOSED (HCC): ICD-10-CM

## 2025-05-30 DIAGNOSIS — L89.313 DECUBITUS ULCER OF ISCHIAL AREA, RIGHT, STAGE III (HCC): ICD-10-CM

## 2025-05-30 DIAGNOSIS — Q05.9 DECUBITUS ULCER DUE TO SPINA BIFIDA (HCC): Primary | ICD-10-CM

## 2025-05-30 DIAGNOSIS — L89.103 DECUBITUS ULCER OF BACK, STAGE 3 (HCC): ICD-10-CM

## 2025-05-30 DIAGNOSIS — L89.90 DECUBITUS ULCER DUE TO SPINA BIFIDA (HCC): Primary | ICD-10-CM

## 2025-05-30 PROCEDURE — 11045 DBRDMT SUBQ TISS EACH ADDL: CPT

## 2025-05-30 PROCEDURE — 11042 DBRDMT SUBQ TIS 1ST 20SQCM/<: CPT

## 2025-05-30 RX ORDER — LIDOCAINE HYDROCHLORIDE 40 MG/ML
SOLUTION TOPICAL ONCE
Status: COMPLETED | OUTPATIENT
Start: 2025-05-30 | End: 2025-05-30

## 2025-05-30 RX ADMIN — LIDOCAINE HYDROCHLORIDE 5 ML: 40 SOLUTION TOPICAL at 14:50

## 2025-05-30 ASSESSMENT — PAIN DESCRIPTION - LOCATION: LOCATION: BACK

## 2025-05-30 ASSESSMENT — PAIN - FUNCTIONAL ASSESSMENT: PAIN_FUNCTIONAL_ASSESSMENT: ACTIVITIES ARE NOT PREVENTED

## 2025-05-30 ASSESSMENT — PAIN SCALES - GENERAL: PAINLEVEL_OUTOF10: 2

## 2025-05-30 ASSESSMENT — PAIN DESCRIPTION - DESCRIPTORS: DESCRIPTORS: DULL

## 2025-05-30 ASSESSMENT — PAIN DESCRIPTION - ORIENTATION: ORIENTATION: LOWER

## 2025-05-30 NOTE — PROGRESS NOTES
12/8/23  Left plantar foot 3rd digit wound healed   Patient was transported to [facility] and had surgical debridedment of bilateral ischial wounds   Bilateral ischial/scrotal wounds measuring smaller, appearance also improved   All other wounds overall appearances continue to improve, measuring smaller  Wounds debrided   Continue to cleanse bilateral ischium with Dakin's or Vashe  Dakin's (or Vashe) packing to bilateral ischium  Plain aquacel to all other wounds     12/22/23  Right plantar foot wound healed   Left ischial wound measuring smaller, appearance continues to improve  Right ischial and left plantar foot wounds measuring larger  Back wound overall appearances continue to improve  Continue to cleanse bilateral ischium with Dakin's or Vashe  Dakin's (or Vashe) packing to bilateral ischium  Plain aquacel to all other wounds     1/5/24  Left plantar foot 3rd metatarsal wound healed   Left ischial wound measuring smaller, appearance continues to improve  Right ischial wound appearance improving but measuring larger - discrepancy in measurements seem to be positional   Back wound overall appearances continue to improve, measuring smaller   Continue to cleanse bilateral ischium with Dakin's or Vashe  Dakin's (or Vashe) packing to bilateral ischium  Plain aquacel to all other wounds     1/26/24  New left plantar foot wound    Bilateral ischial wound measuring smaller, appearance continues to improve  Back wound appearances continue to improve, measuring smaller   Continue to cleanse bilateral ischium with Dakin's or Vashe  Dakin's (or Vashe) packing to bilateral ischium  Plain aquacel to all other wounds   Patient general malaise without fever at today's visit    2/9/24  Left plantar foot wound discolored  but measuring slightly smaller   Wounds debrided   Bilateral ischial wound measuring smaller, appearance continues to improve  Back wound appearances continue to improve, measuring smaller   Plain

## 2025-06-13 ENCOUNTER — HOSPITAL ENCOUNTER (OUTPATIENT)
Dept: WOUND CARE | Age: 50
Discharge: HOME | End: 2025-06-13
Attending: EMERGENCY MEDICINE | Admitting: SURGERY
Payer: MEDICARE

## 2025-06-13 VITALS
DIASTOLIC BLOOD PRESSURE: 76 MMHG | TEMPERATURE: 97 F | SYSTOLIC BLOOD PRESSURE: 134 MMHG | HEIGHT: 62 IN | HEART RATE: 73 BPM | BODY MASS INDEX: 38.64 KG/M2 | WEIGHT: 210 LBS | RESPIRATION RATE: 18 BRPM

## 2025-06-13 DIAGNOSIS — L89.104 DECUBITUS ULCER OF BACK, STAGE 4 (HCC): Primary | ICD-10-CM

## 2025-06-13 PROCEDURE — 11042 DBRDMT SUBQ TIS 1ST 20SQCM/<: CPT

## 2025-06-13 PROCEDURE — 11045 DBRDMT SUBQ TISS EACH ADDL: CPT

## 2025-06-13 PROCEDURE — 87070 CULTURE OTHR SPECIMN AEROBIC: CPT

## 2025-06-13 PROCEDURE — 87205 SMEAR GRAM STAIN: CPT

## 2025-06-13 NOTE — DISCHARGE INSTRUCTIONS
Visit Discharge/Physician Orders     Discharge condition: Stable     Assessment of pain at discharge: MILD     Anesthetic used: 4% LIDOCAINE     Discharge to: Home     Left via:Private automobile     Accompanied by: accompanied by mother     ECF/HHA:  [facility]      Dressing Orders: TO BACK CLEAN WITH DAKINS SOLUTION,  APPLY DAKINS SOAKED GAUZE TO REMAINDER OF WOUND BED, DRY DRESSING. CHANGE DAILY      May pad wounds with foam for extra offloading      Treatment Orders: Follow a nutritious diet. Choose foods high in protein: chicken, fish, and eggs. Choose foods high in Vitamin C. Multivitamin daily unless contraindicated. Offload back as often as possible. Try to change position every 2 hours. Offload feet with eggcrate cushions.              [facility] followup visit _______2 WEEK  ___________  (Please note your next appointment above and if you are unable to keep, kindly give a 24 hour notice. Thank you.)     Physician signature:__________________________        If you experience any of the following, please call the Wound Care Center during business hours:     * Increase in Pain  * Temperature over 101  * Increase in drainage from your wound  * Drainage with a foul odor  * Bleeding  * Increase in swelling  * Need for compression bandage changes due to slippage, breakthrough drainage.     If you need medical attention outside of the business hours of the Wound Care Centers please contact your PCP or go to the nearest emergency room.

## 2025-06-13 NOTE — PROGRESS NOTES
gauze and dry dressing   Surgical debridement recommended     2/7/25  Wound looks better, one area of necrotic tissue debrided sharply, remainder debrided with curette  Santyl to remaining small amount of necrotic tissue, dakins to rest  Cancel OR for now, will reevaluate next week    2/14/25  Wound continues to improve slowly  More necrotic tissue debrided sharply today  Continue current dressings    2/28/25  Back wound improving, debrided  New right posterior thigh wound debrided, culture obtained  Dakins dressings    3/14/25  Back wound smaller, improvement in appearance, debrided  Right posterior thigh wound debrided  Santyl and Dakins both wounds    3/28/25  Back wound smaller, all granulation tissue, no debridement  Right posterior thigh wound, smaller, no debridement    4/11/25  Back wound smaller, healthy appearance, debrided 100%  Right post thigh wound smaller, healthy    5/2/25  Back wound much smaller, lots of good skin ingrowth, granulation tissue chemically cauterized with silver nitrate  Right post thigh wound healed out    5/16/25  Wound smaller, mostly granulation tissue, debrided fibrinous exudate and slough    5/30/24  Wound similar size, debrided    6/13/25  Wound much smaller, debrided  Will try to get approval for skin substitute graft      PAST MEDICAL HISTORY      Diagnosis Date    Acquired lymphedema     Decreased dorsalis pedis pulse 07/07/2023    Hydrocephalus ([facility])     Hypertension     Leg wound, right     upper    Malaise 01/26/2024    On home O2     2 liters nc continuous    DEBBIE treated with BiPAP     Paraplegia (HCC)     Prolonged emergence from general anesthesia     Spina bifida (HCC)     Suprapubic catheter placed     Unspecified adrenocortical insufficiency 03/02/2023    Wound of back      Past Surgical History:   Procedure Laterality Date    ANKLE SURGERY      BLADDER SURGERY      CHOLECYSTECTOMY      HIP SURGERY Right     to correct dislocation    SPINE SURGERY      VENTRICULAR

## 2025-06-13 NOTE — WOUND CARE
Insurance Verification Request            Ordering Center:     [facility]  [address] [address]  Telephone: [phone]       FAX [phone]    Facility NPI: [id number]  Facility Tax ID [id number]    Lisbeth Gregg RN    Provider Information:     Provider's Name and NPI Dr. Brown   NPI: [id number]    Patient Information:      Thai Ward  [address] [place] [id number]   [phone]   : 1975  AGE: 49 y.o.     GENDER: male   TODAYS DATE:  2025    Application/product Information:     Benefit Verification Request:    Reason for Request: New Wound    [employer] Fax # [phone]    Trunk/Arms/Legs 11773 (1st 25 sq cm)    Apligraf, NuShield, and Puraply AM    Has patient been treated with any other Skin Substitute for this Wound:   No    If yes, How many previous applications: na     WOUND #: 1    Total Square CM: see attached     Procedure setting: Hospital Outpatient Department POS 22    Is the Patient currently in a skilled nursing facility: No     Is the wound work related: No    Procedure date: asap      Insurance:        PRIMARY INSURANCE:  Plan: MEDICARE PART A AND B  Coverage: MEDICARE  Effective Date: 3/1/2001  Group Number: [unfilled]  Subscriber Number: [id number] - (Medicare)    Payer/Plan Subscr  Sex Relation Sub. Ins. ID Effective Group Num   1. MEDICARE - ME* THAI WARD 1975 Male Self [id number] 3/1/01                                    [address]. [facility]* THAI WARD 1975 Male Self [id number] 10/1/1997                                    [address] [address]       Patient Information:     Dx Codes:   Diabetic Ulcer [] Yes   [] No,   Venous Ulcer [] Yes   [] No,   Other [x] Yes   [] No    Wound ICD-10 Code: L89.104     Problem List Items Addressed This Visit          Other    Decubitus ulcer of back, stage 4 (HCC) - Primary       No data recorded     Is the Patient a Diabetic: No    HgBA1c:

## 2025-06-25 NOTE — DISCHARGE INSTRUCTIONS
Visit Discharge/Physician Orders     Discharge condition: Stable     Assessment of pain at discharge: MILD     Anesthetic used: 4% LIDOCAINE     Discharge to: Home     Left via:Private automobile     Accompanied by: accompanied by mother     ECF/HHA:  [facility]      Dressing Orders: TO BACK CLEAN WITH DAKINS SOLUTION,  APPLY DAKINS SOAKED GAUZE TO REMAINDER OF WOUND BED, DRY DRESSING. CHANGE DAILY   RIGHT PLANTAR FOOT APPLY DRY DRESSING. PAD WELL      May pad wounds with foam for extra offloading      Treatment Orders: Follow a nutritious diet. Choose foods high in protein: chicken, fish, and eggs. Choose foods high in Vitamin C. Multivitamin daily unless contraindicated. Offload back as often as possible. Try to change position every 2 hours. Offload feet with eggcrate cushions.              [facility] followup visit _______3 WEEKS__________  (Please note your next appointment above and if you are unable to keep, kindly give a 24 hour notice. Thank you.)     Physician signature:__________________________        If you experience any of the following, please call the Wound Care Center during business hours:     * Increase in Pain  * Temperature over 101  * Increase in drainage from your wound  * Drainage with a foul odor  * Bleeding  * Increase in swelling  * Need for compression bandage changes due to slippage, breakthrough drainage.     If you need medical attention outside of the business hours of the Wound Care Centers please contact your PCP or go to the nearest emergency room.

## 2025-06-27 ENCOUNTER — HOSPITAL ENCOUNTER (OUTPATIENT)
Dept: WOUND CARE | Age: 50
Discharge: HOME | End: 2025-06-27
Attending: EMERGENCY MEDICINE | Admitting: SURGERY
Payer: MEDICARE

## 2025-06-27 VITALS
RESPIRATION RATE: 17 BRPM | HEART RATE: 71 BPM | TEMPERATURE: 97 F | HEIGHT: 62 IN | BODY MASS INDEX: 38.64 KG/M2 | WEIGHT: 210 LBS | DIASTOLIC BLOOD PRESSURE: 79 MMHG | SYSTOLIC BLOOD PRESSURE: 133 MMHG

## 2025-06-27 DIAGNOSIS — Q05.9 DECUBITUS ULCER DUE TO SPINA BIFIDA (HCC): Primary | ICD-10-CM

## 2025-06-27 DIAGNOSIS — L97.512 ULCER OF FOOT, RIGHT, WITH FAT LAYER EXPOSED (HCC): ICD-10-CM

## 2025-06-27 DIAGNOSIS — L97.522 ULCER OF FOOT, LEFT, WITH FAT LAYER EXPOSED (HCC): ICD-10-CM

## 2025-06-27 DIAGNOSIS — L89.103 DECUBITUS ULCER OF BACK, STAGE 3 (HCC): ICD-10-CM

## 2025-06-27 DIAGNOSIS — L89.90 DECUBITUS ULCER DUE TO SPINA BIFIDA (HCC): Primary | ICD-10-CM

## 2025-06-27 DIAGNOSIS — L89.313 DECUBITUS ULCER OF ISCHIAL AREA, RIGHT, STAGE III (HCC): ICD-10-CM

## 2025-06-27 PROCEDURE — 11045 DBRDMT SUBQ TISS EACH ADDL: CPT

## 2025-06-27 PROCEDURE — 11042 DBRDMT SUBQ TIS 1ST 20SQCM/<: CPT

## 2025-06-27 NOTE — PROGRESS NOTES
Wound Healing Center /Hyperbarics   History and Physical/Consultation  Vascular    Referring Physician : Massimo Yoon MD  Thai Church  MEDICAL RECORD NUMBER:  [id number]  AGE: 49 y.o.   GENDER: male  : 1975  EPISODE DATE:  2025  Subjective:     Chief Complaint   Patient presents with    Wound Check     back         HISTORY of PRESENT ILLNESS HPI     Thai Church is a 49 y.o. male who presents today for wound/ulcer evaluation.   History of Wound Context:  The patient has had a wounds of both feet, right ischium, back pressure ulcers between the junction of the lumbar spine and thoracic spine which was first noted approximately at least 3 to 4 months, patient and his mother tell me that they noticed that when the patient got home from a extended care facility but they do not recall how long he had ulcers,.  This has been treated at the [facility] and family came to [facility], and in the past he was treated here with good results.  On their initial visit to the wound healing center, 23, the patient has noted that the wound has not been improving.  The patient has had similar previous wounds in the past.        Patient has spina bifida with hydrocephalus, has undergone previous surgeries multiple including peritoneal venous shunt for hydrocephalus in the [place], has paraplegia, does not ambulate, recently underwent insertion of suprapubic catheter    Pt is currently not on abx.      Wound/Ulcer Pain Timing/Severity: constant  Quality of pain: dull, aching  Severity:  3 / 10   Modifying Factors: None  Associated Signs/Symptoms: drainage and pain    Ulcer Identification:  Ulcer Type: pressure and non-healing/non-surgical  Contributing Factors: lymphedema, chronic pressure, decreased mobility, and shear force    Diabetic/Pressure/Non Pressure Ulcers only:  Ulcer: N/A      Wound: Patient does have multiple pressure ulcers, at the junction of the lumbar and thoracic

## 2025-07-16 NOTE — DISCHARGE INSTRUCTIONS
Visit Discharge/Physician Orders     Discharge condition: Stable     Assessment of pain at discharge: MILD     Anesthetic used: 4% LIDOCAINE     Discharge to: Home     Left via:Private automobile     Accompanied by: accompanied by mother     ECF/HHA:  [facility]      Dressing Orders: TO BACK CLEAN WITH DAKINS SOLUTION,  APPLY DAKINS SOAKED GAUZE TO REMAINDER OF WOUND BED, DRY DRESSING. CHANGE DAILY   RIGHT PLANTAR FOOT: Cleanse with saline, aquacel AG, dry dressing. Change daily.     May pad wounds with foam for extra offloading      Treatment Orders: Follow a nutritious diet. Choose foods high in protein: chicken, fish, and eggs. Choose foods high in Vitamin C. Multivitamin daily unless contraindicated. Offload back as often as possible. Try to change position every 2 hours. Offload feet with eggcrate cushions.              [facility] followup visit _______2 WEEKS__________  (Please note your next appointment above and if you are unable to keep, kindly give a 24 hour notice. Thank you.)     Physician signature:__________________________        If you experience any of the following, please call the Wound Care Center during business hours:     * Increase in Pain  * Temperature over 101  * Increase in drainage from your wound  * Drainage with a foul odor  * Bleeding  * Increase in swelling  * Need for compression bandage changes due to slippage, breakthrough drainage.     If you need medical attention outside of the business hours of the Wound Care Centers please contact your PCP or go to the nearest emergency room.

## 2025-07-18 ENCOUNTER — HOSPITAL ENCOUNTER (OUTPATIENT)
Dept: WOUND CARE | Age: 50
Discharge: HOME | End: 2025-07-18
Attending: EMERGENCY MEDICINE | Admitting: SURGERY
Payer: MEDICARE

## 2025-07-18 VITALS
RESPIRATION RATE: 16 BRPM | HEIGHT: 62 IN | TEMPERATURE: 97 F | WEIGHT: 210 LBS | DIASTOLIC BLOOD PRESSURE: 71 MMHG | SYSTOLIC BLOOD PRESSURE: 122 MMHG | HEART RATE: 80 BPM | BODY MASS INDEX: 38.64 KG/M2

## 2025-07-18 DIAGNOSIS — L89.103 DECUBITUS ULCER OF BACK, STAGE 3 (HCC): Primary | ICD-10-CM

## 2025-07-18 DIAGNOSIS — L97.512 ULCER OF FOOT, RIGHT, WITH FAT LAYER EXPOSED (HCC): ICD-10-CM

## 2025-07-18 PROCEDURE — 11045 DBRDMT SUBQ TISS EACH ADDL: CPT

## 2025-07-18 PROCEDURE — 11042 DBRDMT SUBQ TIS 1ST 20SQCM/<: CPT

## 2025-07-18 RX ORDER — LIDOCAINE HYDROCHLORIDE 40 MG/ML
SOLUTION TOPICAL ONCE
Status: COMPLETED | OUTPATIENT
Start: 2025-07-18 | End: 2025-07-18

## 2025-07-18 RX ADMIN — LIDOCAINE HYDROCHLORIDE 10 ML: 40 SOLUTION TOPICAL at 14:01

## 2025-07-18 NOTE — PLAN OF CARE
Problem: Cognitive:  Goal: Knowledge of wound care  Description: Knowledge of wound care  Outcome: Progressing  Goal: Understands risk factors for wounds  Description: Understands risk factors for wounds  Outcome: Progressing     Problem: Pressure Ulcer:  Goal: Signs of wound healing will improve  Description: Signs of wound healing will improve  Outcome: Progressing

## 2025-07-18 NOTE — PROGRESS NOTES
(cm^3) 6.448 cm^3 07/18/25 1406   Undermining Starts ___ O'Clock 5 06/28/24 1331   Undermining Ends___ O'Clock 5 06/28/24 1331   Undermining Maxium Distance (cm) .2 06/28/24 1331   Wound Assessment Slough;Granulation tissue;Pink/red 07/18/25 1357   Drainage Amount Moderate (25-50%) 07/18/25 1357   Drainage Description Thick;Yellow;Green 07/18/25 1357   Odor None 07/18/25 1357   Ceci-wound Assessment Intact 07/18/25 1357   Margins Attached edges 05/30/25 1444   Wound Thickness Description not for Pressure Injury Full thickness 06/27/25 1445   Number of days: 741       Wound 06/27/25 Right;Plantar #16 (Active)   Wound Image   06/27/25 1428   Dressing Status New dressing applied 06/27/25 1600   Wound Cleansed Cleansed with saline 06/27/25 1600   Dressing/Treatment Foam;ABD;Roll gauze 06/27/25 1600   Wound Length (cm) 1.5 cm 07/18/25 1357   Wound Width (cm) 3 cm 07/18/25 1357   Wound Depth (cm) 0.3 cm 07/18/25 1357   Wound Surface Area (cm^2) 4.5 cm^2 07/18/25 1357   Change in Wound Size % (l*w) 59.82 07/18/25 1357   Wound Volume (cm^3) 1.35 cm^3 07/18/25 1357   Wound Healing % 40 07/18/25 1357   Post-Procedure Length (cm) 1.7 cm 07/18/25 1406   Post-Procedure Width (cm) 3.2 cm 07/18/25 1406   Post-Procedure Depth (cm) 0.3 cm 07/18/25 1406   Post-Procedure Surface Area (cm^2) 5.44 cm^2 07/18/25 1406   Post-Procedure Volume (cm^3) 1.632 cm^3 07/18/25 1406   Wound Assessment Pink/red;Purple/maroon;Fibrin 07/18/25 1357   Drainage Amount Moderate (25-50%) 07/18/25 1357   Drainage Description Yellow;Serosanguinous 07/18/25 1357   Odor None 07/18/25 1357   Ceci-wound Assessment Fragile;Edematous 07/18/25 1357   Margins Attached edges 06/27/25 1428   Wound Thickness Description not for Pressure Injury Full thickness 06/27/25 1428   Number of days: 20          Total Surface Area Debrided:  35 sq cm     Estimated Blood Loss:  Minimal    Hemostasis Achieved:  by pressure    Procedural Pain:  0  / 10     Post Procedural Pain:  0 /

## 2025-08-08 ENCOUNTER — HOSPITAL ENCOUNTER (OUTPATIENT)
Dept: WOUND CARE | Age: 50
Discharge: HOME | End: 2025-08-08
Attending: EMERGENCY MEDICINE | Admitting: SURGERY
Payer: MEDICARE

## 2025-08-08 VITALS
WEIGHT: 210 LBS | HEART RATE: 87 BPM | RESPIRATION RATE: 16 BRPM | TEMPERATURE: 97.4 F | SYSTOLIC BLOOD PRESSURE: 143 MMHG | HEIGHT: 62 IN | DIASTOLIC BLOOD PRESSURE: 85 MMHG | BODY MASS INDEX: 38.64 KG/M2

## 2025-08-08 DIAGNOSIS — L89.90 DECUBITUS ULCER DUE TO SPINA BIFIDA (HCC): Primary | ICD-10-CM

## 2025-08-08 DIAGNOSIS — Q05.9 DECUBITUS ULCER DUE TO SPINA BIFIDA (HCC): Primary | ICD-10-CM

## 2025-08-08 DIAGNOSIS — L97.512 ULCER OF FOOT, RIGHT, WITH FAT LAYER EXPOSED (HCC): ICD-10-CM

## 2025-08-08 PROCEDURE — 11045 DBRDMT SUBQ TISS EACH ADDL: CPT

## 2025-08-08 PROCEDURE — 11042 DBRDMT SUBQ TIS 1ST 20SQCM/<: CPT

## 2025-08-08 RX ORDER — LIDOCAINE HYDROCHLORIDE 40 MG/ML
SOLUTION TOPICAL ONCE
Status: COMPLETED | OUTPATIENT
Start: 2025-08-08 | End: 2025-08-08

## 2025-08-08 RX ADMIN — LIDOCAINE HYDROCHLORIDE 10 ML: 40 SOLUTION TOPICAL at 14:03

## 2025-08-22 ENCOUNTER — HOSPITAL ENCOUNTER (OUTPATIENT)
Dept: WOUND CARE | Age: 50
Discharge: HOME | End: 2025-08-22
Attending: EMERGENCY MEDICINE | Admitting: SURGERY

## 2025-08-22 VITALS
WEIGHT: 210 LBS | DIASTOLIC BLOOD PRESSURE: 70 MMHG | TEMPERATURE: 96.8 F | SYSTOLIC BLOOD PRESSURE: 117 MMHG | BODY MASS INDEX: 38.64 KG/M2 | HEART RATE: 79 BPM | RESPIRATION RATE: 14 BRPM | HEIGHT: 62 IN

## 2025-08-22 DIAGNOSIS — Q05.9 DECUBITUS ULCER DUE TO SPINA BIFIDA (HCC): Primary | ICD-10-CM

## 2025-08-22 DIAGNOSIS — L89.103 DECUBITUS ULCER OF BACK, STAGE 3 (HCC): ICD-10-CM

## 2025-08-22 DIAGNOSIS — L89.90 DECUBITUS ULCER DUE TO SPINA BIFIDA (HCC): Primary | ICD-10-CM

## 2025-08-22 DIAGNOSIS — L97.522 ULCER OF FOOT, LEFT, WITH FAT LAYER EXPOSED (HCC): ICD-10-CM

## 2025-08-22 DIAGNOSIS — L97.512 ULCER OF FOOT, RIGHT, WITH FAT LAYER EXPOSED (HCC): ICD-10-CM

## 2025-08-22 DIAGNOSIS — L89.313 DECUBITUS ULCER OF ISCHIAL AREA, RIGHT, STAGE III (HCC): ICD-10-CM

## 2025-08-22 RX ORDER — CEPHALEXIN 500 MG/1
500 CAPSULE ORAL 4 TIMES DAILY
COMMUNITY

## 2025-08-22 ASSESSMENT — PAIN SCALES - GENERAL: PAINLEVEL_OUTOF10: 0

## 2025-09-05 ENCOUNTER — HOSPITAL ENCOUNTER (OUTPATIENT)
Dept: WOUND CARE | Age: 50
Discharge: HOME | End: 2025-09-05
Attending: EMERGENCY MEDICINE | Admitting: SURGERY
Payer: MEDICARE

## 2025-09-05 VITALS
HEIGHT: 62 IN | DIASTOLIC BLOOD PRESSURE: 89 MMHG | BODY MASS INDEX: 38.64 KG/M2 | HEART RATE: 79 BPM | TEMPERATURE: 97.7 F | SYSTOLIC BLOOD PRESSURE: 136 MMHG | WEIGHT: 210 LBS | RESPIRATION RATE: 16 BRPM

## 2025-09-05 DIAGNOSIS — L97.512 ULCER OF FOOT, RIGHT, WITH FAT LAYER EXPOSED (HCC): ICD-10-CM

## 2025-09-05 DIAGNOSIS — L97.522 ULCER OF FOOT, LEFT, WITH FAT LAYER EXPOSED (HCC): ICD-10-CM

## 2025-09-05 DIAGNOSIS — Q05.9 DECUBITUS ULCER DUE TO SPINA BIFIDA (HCC): Primary | ICD-10-CM

## 2025-09-05 DIAGNOSIS — L89.313 DECUBITUS ULCER OF ISCHIAL AREA, RIGHT, STAGE III (HCC): ICD-10-CM

## 2025-09-05 DIAGNOSIS — L89.103 DECUBITUS ULCER OF BACK, STAGE 3 (HCC): ICD-10-CM

## 2025-09-05 DIAGNOSIS — L89.90 DECUBITUS ULCER DUE TO SPINA BIFIDA (HCC): Primary | ICD-10-CM

## 2025-09-05 PROCEDURE — 11045 DBRDMT SUBQ TISS EACH ADDL: CPT

## 2025-09-05 PROCEDURE — 11042 DBRDMT SUBQ TIS 1ST 20SQCM/<: CPT

## 2025-09-05 RX ORDER — LIDOCAINE HYDROCHLORIDE 40 MG/ML
SOLUTION TOPICAL ONCE
Status: COMPLETED | OUTPATIENT
Start: 2025-09-05 | End: 2025-09-05

## 2025-09-05 RX ADMIN — LIDOCAINE HYDROCHLORIDE 10 ML: 40 SOLUTION TOPICAL at 14:30

## 2025-09-05 ASSESSMENT — PAIN SCALES - GENERAL: PAINLEVEL_OUTOF10: 0
